# Patient Record
Sex: FEMALE | Race: WHITE | NOT HISPANIC OR LATINO | Employment: OTHER | URBAN - NONMETROPOLITAN AREA
[De-identification: names, ages, dates, MRNs, and addresses within clinical notes are randomized per-mention and may not be internally consistent; named-entity substitution may affect disease eponyms.]

---

## 2017-01-10 ENCOUNTER — ALLSCRIPTS OFFICE VISIT (OUTPATIENT)
Dept: FAMILY MEDICINE CLINIC | Facility: CLINIC | Age: 52
End: 2017-01-10
Payer: MEDICARE

## 2017-01-10 PROCEDURE — 99213 OFFICE O/P EST LOW 20 MIN: CPT | Performed by: NURSE PRACTITIONER

## 2017-01-16 ENCOUNTER — HOSPITAL ENCOUNTER (OUTPATIENT)
Dept: RADIOLOGY | Facility: CLINIC | Age: 52
Discharge: HOME | End: 2017-01-16
Admitting: EMERGENCY MEDICINE
Payer: MEDICARE

## 2017-01-16 ENCOUNTER — OFFICE VISIT (OUTPATIENT)
Dept: URGENT CARE | Facility: CLINIC | Age: 52
End: 2017-01-16
Payer: MEDICARE

## 2017-01-16 DIAGNOSIS — M79.642 PAIN OF LEFT HAND: ICD-10-CM

## 2017-01-16 PROCEDURE — 90714 TD VACC NO PRESV 7 YRS+ IM: CPT

## 2017-01-16 PROCEDURE — 73130 X-RAY EXAM OF HAND: CPT

## 2017-01-16 PROCEDURE — G0463 HOSPITAL OUTPT CLINIC VISIT: HCPCS

## 2017-01-16 PROCEDURE — 99204 OFFICE O/P NEW MOD 45 MIN: CPT

## 2017-04-04 ENCOUNTER — OFFICE VISIT (OUTPATIENT)
Dept: URGENT CARE | Facility: CLINIC | Age: 52
End: 2017-04-04
Payer: MEDICARE

## 2017-04-04 ENCOUNTER — HOSPITAL ENCOUNTER (OUTPATIENT)
Dept: RADIOLOGY | Facility: CLINIC | Age: 52
Discharge: HOME | End: 2017-04-04
Admitting: EMERGENCY MEDICINE
Payer: MEDICARE

## 2017-04-04 DIAGNOSIS — M79.671 PAIN OF RIGHT FOOT: ICD-10-CM

## 2017-04-04 PROCEDURE — 73610 X-RAY EXAM OF ANKLE: CPT

## 2017-04-04 PROCEDURE — 99214 OFFICE O/P EST MOD 30 MIN: CPT

## 2017-04-04 PROCEDURE — G0463 HOSPITAL OUTPT CLINIC VISIT: HCPCS

## 2017-04-04 PROCEDURE — 73630 X-RAY EXAM OF FOOT: CPT

## 2017-04-11 ENCOUNTER — GENERIC CONVERSION - ENCOUNTER (OUTPATIENT)
Dept: OTHER | Facility: OTHER | Age: 52
End: 2017-04-11

## 2017-07-31 ENCOUNTER — ALLSCRIPTS OFFICE VISIT (OUTPATIENT)
Dept: FAMILY MEDICINE CLINIC | Facility: CLINIC | Age: 52
End: 2017-07-31
Payer: MEDICARE

## 2017-07-31 DIAGNOSIS — E55.9 VITAMIN D DEFICIENCY: ICD-10-CM

## 2017-07-31 DIAGNOSIS — Z12.31 ENCOUNTER FOR SCREENING MAMMOGRAM FOR MALIGNANT NEOPLASM OF BREAST: ICD-10-CM

## 2017-07-31 DIAGNOSIS — R53.83 OTHER FATIGUE: ICD-10-CM

## 2017-07-31 DIAGNOSIS — Z13.220 ENCOUNTER FOR SCREENING FOR LIPOID DISORDERS: ICD-10-CM

## 2017-07-31 PROCEDURE — 99213 OFFICE O/P EST LOW 20 MIN: CPT | Performed by: FAMILY MEDICINE

## 2017-08-02 ENCOUNTER — GENERIC CONVERSION - ENCOUNTER (OUTPATIENT)
Dept: OTHER | Facility: OTHER | Age: 52
End: 2017-08-02

## 2017-08-10 ENCOUNTER — GENERIC CONVERSION - ENCOUNTER (OUTPATIENT)
Dept: OTHER | Facility: OTHER | Age: 52
End: 2017-08-10

## 2017-08-11 ENCOUNTER — APPOINTMENT (OUTPATIENT)
Dept: LAB | Facility: HOSPITAL | Age: 52
End: 2017-08-11
Payer: MEDICARE

## 2017-08-11 ENCOUNTER — TRANSCRIBE ORDERS (OUTPATIENT)
Dept: ADMINISTRATIVE | Facility: HOSPITAL | Age: 52
End: 2017-08-11

## 2017-08-11 DIAGNOSIS — R53.83 OTHER FATIGUE: ICD-10-CM

## 2017-08-11 DIAGNOSIS — Z13.220 ENCOUNTER FOR SCREENING FOR LIPOID DISORDERS: ICD-10-CM

## 2017-08-11 DIAGNOSIS — E55.9 VITAMIN D DEFICIENCY: ICD-10-CM

## 2017-08-11 LAB
ALBUMIN SERPL BCP-MCNC: 3.7 G/DL (ref 3.5–5)
ALP SERPL-CCNC: 36 U/L (ref 46–116)
ALT SERPL W P-5'-P-CCNC: 29 U/L (ref 12–78)
ANION GAP SERPL CALCULATED.3IONS-SCNC: 8 MMOL/L (ref 4–13)
AST SERPL W P-5'-P-CCNC: 26 U/L (ref 5–45)
BILIRUB SERPL-MCNC: 0.5 MG/DL (ref 0.2–1)
BUN SERPL-MCNC: 8 MG/DL (ref 5–25)
CALCIUM SERPL-MCNC: 8.3 MG/DL (ref 8.3–10.1)
CHLORIDE SERPL-SCNC: 99 MMOL/L (ref 100–108)
CHOLEST SERPL-MCNC: 209 MG/DL (ref 50–200)
CO2 SERPL-SCNC: 28 MMOL/L (ref 21–32)
CREAT SERPL-MCNC: 0.59 MG/DL (ref 0.6–1.3)
ERYTHROCYTE [DISTWIDTH] IN BLOOD BY AUTOMATED COUNT: 12.3 % (ref 11.6–15.1)
GFR SERPL CREATININE-BSD FRML MDRD: 107 ML/MIN/1.73SQ M
GLUCOSE P FAST SERPL-MCNC: 76 MG/DL (ref 65–99)
HCT VFR BLD AUTO: 36.7 % (ref 34.8–46.1)
HDLC SERPL-MCNC: 78 MG/DL (ref 40–60)
HGB BLD-MCNC: 12.9 G/DL (ref 11.5–15.4)
LDLC SERPL CALC-MCNC: 114 MG/DL (ref 0–100)
MCH RBC QN AUTO: 31.8 PG (ref 26.8–34.3)
MCHC RBC AUTO-ENTMCNC: 35.1 G/DL (ref 31.4–37.4)
MCV RBC AUTO: 90 FL (ref 82–98)
PLATELET # BLD AUTO: 284 THOUSANDS/UL (ref 149–390)
PMV BLD AUTO: 8.8 FL (ref 8.9–12.7)
POTASSIUM SERPL-SCNC: 4.4 MMOL/L (ref 3.5–5.3)
PROT SERPL-MCNC: 6.4 G/DL (ref 6.4–8.2)
RBC # BLD AUTO: 4.06 MILLION/UL (ref 3.81–5.12)
SODIUM SERPL-SCNC: 135 MMOL/L (ref 136–145)
TRIGL SERPL-MCNC: 85 MG/DL
TSH SERPL DL<=0.05 MIU/L-ACNC: 2.3 UIU/ML (ref 0.36–3.74)
VIT B12 SERPL-MCNC: 808 PG/ML (ref 100–900)
WBC # BLD AUTO: 7.09 THOUSAND/UL (ref 4.31–10.16)

## 2017-08-11 PROCEDURE — 36415 COLL VENOUS BLD VENIPUNCTURE: CPT

## 2017-08-11 PROCEDURE — 84443 ASSAY THYROID STIM HORMONE: CPT

## 2017-08-11 PROCEDURE — 85027 COMPLETE CBC AUTOMATED: CPT

## 2017-08-11 PROCEDURE — 80061 LIPID PANEL: CPT

## 2017-08-11 PROCEDURE — 80053 COMPREHEN METABOLIC PANEL: CPT

## 2017-08-11 PROCEDURE — 82652 VIT D 1 25-DIHYDROXY: CPT

## 2017-08-11 PROCEDURE — 82607 VITAMIN B-12: CPT

## 2017-08-14 LAB — 1,25(OH)2D3 SERPL-MCNC: 38.5 PG/ML (ref 19.9–79.3)

## 2017-08-31 ENCOUNTER — APPOINTMENT (OUTPATIENT)
Dept: LAB | Facility: HOSPITAL | Age: 52
End: 2017-08-31
Payer: MEDICARE

## 2017-08-31 ENCOUNTER — ALLSCRIPTS OFFICE VISIT (OUTPATIENT)
Dept: FAMILY MEDICINE CLINIC | Facility: CLINIC | Age: 52
End: 2017-08-31
Payer: MEDICARE

## 2017-08-31 DIAGNOSIS — N89.8 OTHER SPECIFIED NONINFLAMMATORY DISORDER OF VAGINA: ICD-10-CM

## 2017-08-31 LAB
BILIRUB UR QL STRIP: NEGATIVE
BILIRUB UR QL STRIP: NORMAL
CLARITY UR: CLEAR
CLARITY UR: NORMAL
COLOR UR: YELLOW
COLOR UR: YELLOW
GLUCOSE (HISTORICAL): NORMAL
GLUCOSE UR STRIP-MCNC: NEGATIVE MG/DL
HGB UR QL STRIP.AUTO: NEGATIVE
HGB UR QL STRIP.AUTO: NORMAL
KETONES UR STRIP-MCNC: NEGATIVE MG/DL
KETONES UR STRIP-MCNC: NORMAL MG/DL
LEUKOCYTE ESTERASE UR QL STRIP: NEGATIVE
LEUKOCYTE ESTERASE UR QL STRIP: NORMAL
NITRITE UR QL STRIP: NEGATIVE
NITRITE UR QL STRIP: NORMAL
PH UR STRIP.AUTO: 7 [PH]
PH UR STRIP.AUTO: 7 [PH] (ref 4.5–8)
PROT UR STRIP-MCNC: NEGATIVE MG/DL
PROT UR STRIP-MCNC: NORMAL MG/DL
SP GR UR STRIP.AUTO: 1 (ref 1–1.03)
SP GR UR STRIP.AUTO: 1.02
UROBILINOGEN UR QL STRIP.AUTO: 0.2
UROBILINOGEN UR QL STRIP.AUTO: 0.2 E.U./DL

## 2017-08-31 PROCEDURE — 99213 OFFICE O/P EST LOW 20 MIN: CPT | Performed by: FAMILY MEDICINE

## 2017-08-31 PROCEDURE — 81003 URINALYSIS AUTO W/O SCOPE: CPT

## 2017-09-01 ENCOUNTER — HOSPITAL ENCOUNTER (OUTPATIENT)
Dept: MAMMOGRAPHY | Facility: HOSPITAL | Age: 52
Discharge: HOME | End: 2017-09-01
Payer: MEDICARE

## 2017-09-01 DIAGNOSIS — Z12.31 ENCOUNTER FOR SCREENING MAMMOGRAM FOR MALIGNANT NEOPLASM OF BREAST: ICD-10-CM

## 2017-09-01 PROCEDURE — G0202 SCR MAMMO BI INCL CAD: HCPCS

## 2017-09-01 PROCEDURE — 77063 BREAST TOMOSYNTHESIS BI: CPT

## 2017-10-23 ENCOUNTER — APPOINTMENT (OUTPATIENT)
Dept: RADIOLOGY | Facility: CLINIC | Age: 52
End: 2017-10-23
Payer: MEDICARE

## 2017-10-23 ENCOUNTER — OFFICE VISIT (OUTPATIENT)
Dept: URGENT CARE | Facility: CLINIC | Age: 52
End: 2017-10-23
Payer: MEDICARE

## 2017-10-23 DIAGNOSIS — M25.561 PAIN IN RIGHT KNEE: ICD-10-CM

## 2017-10-23 PROCEDURE — G0463 HOSPITAL OUTPT CLINIC VISIT: HCPCS

## 2017-10-23 PROCEDURE — 99213 OFFICE O/P EST LOW 20 MIN: CPT

## 2017-10-23 PROCEDURE — 73564 X-RAY EXAM KNEE 4 OR MORE: CPT

## 2017-10-24 NOTE — PROGRESS NOTES
Assessment  1  Acute pain of right knee (760 42) (Z70 454)    Plan  Acute pain of right knee    · * XR KNEE 4+ VW RIGHT INJURY; Status:Active - Retrospective Authorization; Requested  IRVING:[id number];    · * XR KNEE 4+ VW RIGHT INJURY; Status:Canceled - Retrospective Authorization; Discussion/Summary  Discussion Summary:   Get pull on ace wrap for right knee- wear for workout and when in painTylenol Arthritis to Mobic PRN- do not add long term, only when having pain and before workoutsortho consult if pain continues  Medication Side Effects Reviewed: Possible side effects of new medications were reviewed with the patient/guardian today  Understands and agrees with treatment plan: The treatment plan was reviewed with the patient/guardian  The patient/guardian understands and agrees with the treatment plan      Chief Complaint  1  Knee Pain  Chief Complaint Free Text Note Form: C/O sudden onset of right knee pain upon rising this AM  Pt denies any known injury  Pt states that she is having difficulty walking      History of Present Illness  HPI: c/o right knee- hit right knee off of elliptical machine a few weeks ago  2 days ago she did a high impact cardio/core workout  Has been an aerobics/step instructor times many years but has never noticed knee pain before  this morning she woke up with sharp intermittent right knee pain with extension and walking  no swelling, no redness  pain is described as just below the knee cap   Hospital Based Practices Required Assessment:   Pain Assessment   the patient states they have pain  The pain is located in the right knee  The patient describes the pain as aching  (on a scale of 0 to 10, the patient rates the pain at 8 )   Abuse And Domestic Violence Screen    Yes, the patient is safe at home  -- The patient states no one is hurting them  Depression And Suicide Screen  No, the patient has not had thoughts of hurting themself     No, the patient has not felt depressed in the past 7 days  Knee Pain: AG ANDUJAR presents with complaints of knee pain  Associated symptoms include difficulty ambulating, but-- no swelling,-- no warmth,-- no redness,-- no ecchymosis,-- no stiffness,-- no decreased range of motion,-- no locking,-- no instability,-- no audible pop at the time of injury,-- no fever,-- no chills,-- no localized rash,-- no generalized rash-- and-- no pain in other joints  Active Problems  1  Allergic rhinitis (477 9) (J30 9)   2  Arthritis (716 90) (M19 90)   3  Backache (724 5) (M54 9)   4  Cervical radiculopathy (723 4) (M54 12)   5  Cervical Spine Pain By Bilateral Flexion At Initiation   6  Chronic pain of right ankle (642 63,720 41) (M25 571,G89 29)   7  Closed Fracture Of The Left Index Finger MCP Joint (816 01)   8  Constipation (564 00) (K59 00)   9  Contact dermatitis (692 9) (L25 9)   10  Depression (311) (F32 9)   11  Dysmenorrhea (625 3) (N94 6)   12  Encounter for gynecological examination (V72 31) (Z01 419)   13  Encounter for routine gynecological examination (V72 31) (Z01 419)   14  Encounter for screening colonoscopy (V76 51) (Z12 11)   15  Esophageal reflux (530 81) (K21 9)   16  Fatigue (780 79) (R53 83)   17  Fibromyalgia (729 1) (M79 7)   18  Finger pain, left (729 5) (M79 645)   19  Fracture of metacarpal, closed (815 00) (S62 309A)   20  Herniated lumbar intervertebral disc (722 10) (M51 26)   21  Hypercholesteremia (272 0) (E78 00)   22  Hypocalcemia (275 41) (E83 51)   23  Hyponatremia (276 1) (E87 1)   24  Laceration Of Hand (882 0)   25  Lumbago (724 2) (M54 5)   26  Menometrorrhagia (626 2) (N92 1)   27  Menorrhagia (626 2) (N92 0)   28  Narcolepsy (347 00) (G47 419)   29  Nasal sore (478 19) (J34 89)   30  Need for prophylactic vaccination and inoculation against influenza (V04 81) (Z23)   31  Nose irritation (478 19) (J34 89)   32  Pain in joint of left shoulder (719 41) (M25 512)   33  Pain of left hand (729 5) (M79 642)   34   Primary localized osteoarthrosis of the hip, unspecified laterality (715 15) (M16 10)   35  Right foot pain (729 5) (M79 671)   36  Right hip pain (719 45) (M25 551)   37  Screening for lipid disorders (V77 91) (Z13 220)   38  Sinus bradycardia (427 89) (R00 1)   39  Skin lesion (709 9) (L98 9)   40  Spondylosis of lumbosacral region without myelopathy or radiculopathy (721 3)    (M47 817)   41  Sprain of left ring finger (842 10) (S63 615A)   42  Tooth pain (525 9) (K08 89)   43  Urinary tract infection (599 0) (N39 0)   44  Vaginal abrasion (911 0) (S30 814A)   45  Vaginal irritation (623 9) (N89 8)   46  Visit for screening mammogram (V76 12) (Z12 31)   47  Vitamin D deficiency (268 9) (E55 9)   48  Wart (078 10) (B07 9)    Past Medical History  1  History of Acute upper respiratory infection (465 9) (J06 9)   2  History Of 3  Previous Pregnancies (V61 5)   3  Need for prophylactic vaccination and inoculation against influenza (V04 81) (Z23)  Active Problems And Past Medical History Reviewed: The active problems and past medical history were reviewed and updated today  Family History  Paternal Grandmother    1  Family history of Breast Cancer (V16 3)   2  Family history of Colon Cancer (V16 0)  Family History    3  Family history of Arthritis (V17 7)   4  Family history of Diabetes Mellitus (V18 0)   5  Family history of Hypertension (V17 49)   6  Denied: Family history of Osteoporosis   7  Denied: Family history of Ovarian Cancer   8  Denied: Family history of Uterine Cancer  Family History Reviewed: The family history was reviewed and updated today  Social History   · Being A Social Drinker   · Caffeine Use   · Denied: History of Drug Use   · Never A Smoker  Social History Reviewed: The social history was reviewed and updated today  Surgical History  1  History of Breast Surgery Lumpectomy   2  History of Dilation And Curettage Of Cervical Stump   3  History of Repair Of Traumatic Wound   4   History of Shoulder Surgery Right  Surgical History Reviewed: The surgical history was reviewed and updated today  Current Meds   1  Calcium TABS; Therapy: (Ruth Ann Pedraza) to Recorded   2  DULoxetine HCl - 30 MG Oral Capsule Delayed Release Particles; take 1 capsule daily; Therapy: 34YGE8912 to (Evaluate:06Jan2018)  Requested for: 84Nbt4047; Last   Rx:[id number] Ordered   3  Gabapentin 300 MG Oral Capsule; take 1 capsule by mouth twice a day; Therapy: 03Sep2013 to (Evaluate:06Dec2017)  Requested for: 66Oxe1772; Last   Rx:[id number] Ordered   4  Iron Supplement 325 (65 Fe) MG TABS; TAKE 1 TABLET DAILY AS DIRECTED; Therapy: (Recorded:03Mar2014) to Recorded   5  Meloxicam 15 MG Oral Tablet; take 1 tablet by mouth once daily; Therapy: 00FNY8850 to (Evaluate:06Jan2018)  Requested for: 89Jtl6360; Last   Rx:[id number] Ordered   6  Stool Softener TABS; Therapy: (Ruth Ann Pedraza) to Recorded   7  Vagisil Maximum Strength 20-3 % Vaginal Cream; USE AS DIRECTED; Therapy: 09Lwz4176 to (Last Rx:96Nnx5231)  Requested for: 79Gaq9559 Ordered  Medication List Reviewed: The medication list was reviewed and updated today  Allergies  1  Lyrica CAPS   2  Amitriptyline HCl TABS    Vitals  Signs   Recorded: 23Oct2017 10:02AM   Temperature: 96 7 F  Heart Rate: 68  Respiration: 18  Systolic: 124  Diastolic: 62  Height: 5 ft 2 in  Weight: 106 lb 0 66 oz  BMI Calculated: 19 4  BSA Calculated: 1 46  O2 Saturation: 99  Pain Scale: 8    Physical Exam    Constitutional   General appearance: No acute distress, well appearing and well nourished  Pulmonary   Respiratory effort: No increased work of breathing or signs of respiratory distress  Auscultation of lungs: Clear to auscultation  Cardiovascular   Auscultation of heart: Normal rate and rhythm, normal S1 and S2, without murmurs  Musculoskeletal   Gait and station: Normal     Digits and nails: Normal without clubbing or cyanosis      Inspection/palpation of joints, bones, and muscles: Normal  -- nontender with palpation, no swelling, no redness, no warmth  no laxity/no crepitus  Skin   Skin and subcutaneous tissue: Normal without rashes or lesions  Psychiatric   Orientation to person, place, and time: Normal     Mood and affect: Normal        Results/Data  Diagnostic Studies Reviewed: I personally reviewed the films/images/results in the office today  My interpretation follows  X-ray Review normal xray right knee        Future Appointments    Date/Time Provider Specialty Site   07/31/2018 10:30 AM Daija Adair, [address] [place] [address]     Signatures   Electronically signed by : Brady Denver; Oct 23 2017 11:10AM EST                       (Author)    Electronically signed by : EMY Us ; Oct 23 2017  4:51PM EST                       (Co-author)

## 2017-12-03 ENCOUNTER — HOSPITAL ENCOUNTER (EMERGENCY)
Facility: HOSPITAL | Age: 52
Discharge: HOME | End: 2017-12-03
Attending: EMERGENCY MEDICINE | Admitting: EMERGENCY MEDICINE
Payer: MEDICARE

## 2017-12-03 VITALS
DIASTOLIC BLOOD PRESSURE: 58 MMHG | RESPIRATION RATE: 20 BRPM | HEIGHT: 62 IN | OXYGEN SATURATION: 99 % | TEMPERATURE: 99.6 F | BODY MASS INDEX: 20.24 KG/M2 | SYSTOLIC BLOOD PRESSURE: 122 MMHG | WEIGHT: 110 LBS | HEART RATE: 61 BPM

## 2017-12-03 DIAGNOSIS — R21 RASH AND NONSPECIFIC SKIN ERUPTION: Primary | ICD-10-CM

## 2017-12-03 DIAGNOSIS — T78.40XA ALLERGIC REACTION, INITIAL ENCOUNTER: ICD-10-CM

## 2017-12-03 PROCEDURE — 99283 EMERGENCY DEPT VISIT LOW MDM: CPT

## 2017-12-03 RX ORDER — DULOXETIN HYDROCHLORIDE 30 MG/1
20 CAPSULE, DELAYED RELEASE ORAL DAILY
COMMUNITY
End: 2018-04-17 | Stop reason: SDUPTHER

## 2017-12-03 RX ORDER — MELOXICAM 15 MG/1
15 TABLET ORAL DAILY
COMMUNITY
End: 2018-04-17 | Stop reason: SDUPTHER

## 2017-12-03 RX ORDER — GABAPENTIN 300 MG/1
100 CAPSULE ORAL DAILY
COMMUNITY
End: 2018-04-17 | Stop reason: SDUPTHER

## 2017-12-03 RX ORDER — PREDNISONE 20 MG/1
TABLET ORAL
Qty: 9 TABLET | Refills: 0 | Status: SHIPPED | OUTPATIENT
Start: 2017-12-03 | End: 2018-03-01

## 2017-12-03 RX ORDER — FERROUS SULFATE 325(65) MG
325 TABLET ORAL
COMMUNITY
End: 2020-04-21 | Stop reason: ALTCHOICE

## 2017-12-03 NOTE — ED PROVIDER NOTES
History  Chief Complaint   Patient presents with    Eye Problem     c/o swelling right orbit  Awoke with same  Possible swelling starting with left eye     Pt began with swelling/itching around right eye last PM-now starting same around left eye  No visual changes  No resp sx  No difficulty swallowing  No fever/chills  Only new exposure pt can think of is new fabric softner- discussed stopping  History provided by:  Patient  Rash   Location:  Face  Facial rash location:  L eyelid and R eyelid  Quality: dryness, itchiness and swelling    Quality: not blistering, not bruising, not draining, not painful and not weeping    Progression:  Worsening  Chronicity:  New  Context: new detergent/soap    Context: not animal contact, not exposure to similar rash, not food, not insect bite/sting, not medications, not plant contact and not sick contacts    Relieved by:  Nothing  Ineffective treatments:  None tried  Associated symptoms: no abdominal pain, no diarrhea, no fever, no headaches, no hoarse voice, no joint pain, no myalgias, no nausea, no shortness of breath, no sore throat, no throat swelling, no tongue swelling, no URI, not vomiting and not wheezing        Prior to Admission Medications   Prescriptions Last Dose Informant Patient Reported? Taking?    Calcium Carbonate-Vitamin D (CALCIUM 500 + D PO)   Yes Yes   Sig: Take 1 tablet by mouth daily   DULoxetine (CYMBALTA) 30 mg delayed release capsule   Yes Yes   Sig: Take 20 mg by mouth daily   Docusate Calcium (STOOL SOFTENER PO)   Yes Yes   Sig: Take 1 capsule by mouth daily   ferrous sulfate 325 (65 Fe) mg tablet   Yes Yes   Sig: Take 325 mg by mouth daily with breakfast   gabapentin (NEURONTIN) 300 mg capsule   Yes Yes   Sig: Take 100 mg by mouth daily   meloxicam (MOBIC) 15 mg tablet   Yes Yes   Sig: Take 15 mg by mouth daily      Facility-Administered Medications: None       Past Medical History:   Diagnosis Date    Back problem        Past Surgical History:   Procedure Laterality Date    LEG SURGERY Left     SHOULDER ARTHROSCOPY         History reviewed  No pertinent family history  I have reviewed and agree with the history as documented  Social History   Substance Use Topics    Smoking status: Former Smoker    Smokeless tobacco: Never Used    Alcohol use Yes      Comment: occassional        Review of Systems   Constitutional: Negative  Negative for activity change, appetite change, chills, diaphoresis and fever  HENT: Negative  Negative for congestion, drooling, hoarse voice, mouth sores, sore throat, trouble swallowing and voice change  Eyes: Negative for photophobia, redness and visual disturbance  Respiratory: Negative  Negative for cough, chest tightness, shortness of breath and wheezing  Cardiovascular: Negative  Negative for chest pain  Gastrointestinal: Negative  Negative for abdominal pain, diarrhea, nausea and vomiting  Genitourinary: Negative  Musculoskeletal: Negative  Negative for arthralgias, gait problem, joint swelling, myalgias, neck pain and neck stiffness  Skin: Positive for rash  Neurological: Negative  Negative for dizziness, facial asymmetry and headaches  Psychiatric/Behavioral: Negative  All other systems reviewed and are negative  Physical Exam  ED Triage Vitals [12/03/17 0910]   Temperature Pulse Respirations Blood Pressure SpO2   99 6 °F (37 6 °C) 61 20 122/58 99 %      Temp src Heart Rate Source Patient Position - Orthostatic VS BP Location FiO2 (%)   -- Monitor -- -- --      Pain Score       No Pain           Orthostatic Vital Signs  Vitals:    12/03/17 0910   BP: 122/58   Pulse: 61       Physical Exam   Constitutional: She is oriented to person, place, and time  She appears well-developed and well-nourished  She is active and cooperative  Non-toxic appearance  She does not have a sickly appearance  She does not appear ill  No distress  HENT:   Head: Normocephalic and atraumatic  Head is without raccoon's eyes and without abrasion  Right Ear: Hearing, tympanic membrane and ear canal normal    Left Ear: Hearing, tympanic membrane and ear canal normal    Nose: Nose normal    Mouth/Throat: Oropharynx is clear and moist  Mucous membranes are not dry and not cyanotic  No oropharyngeal exudate, posterior oropharyngeal edema or posterior oropharyngeal erythema  There is red/dry,mildly swollen areas periorbital right greater then left  No eye involvement   Eyes: Conjunctivae and EOM are normal  Pupils are equal, round, and reactive to light  Right eye exhibits no chemosis and no discharge  Left eye exhibits no chemosis and no discharge  No scleral icterus  Neck: Normal range of motion  Neck supple  No JVD present  No spinous process tenderness and no muscular tenderness present  Cardiovascular: Normal rate, regular rhythm, intact distal pulses and normal pulses  No extrasystoles are present  No perf edema or calf tenderness   Pulmonary/Chest: Effort normal  No accessory muscle usage or stridor  No tachypnea  No respiratory distress  She has no decreased breath sounds  She has no wheezes  She has no rhonchi  She has no rales  Abdominal: Soft  Bowel sounds are normal  She exhibits no distension and no ascites  There is no tenderness  There is no rigidity, no guarding and no CVA tenderness  Neurological: She is alert and oriented to person, place, and time  She has normal strength and normal reflexes  No cranial nerve deficit  Skin: Skin is warm and dry  Rash noted  No petechiae noted  She is not diaphoretic  No cyanosis  No pallor  Psychiatric: She has a normal mood and affect  Her speech is normal and behavior is normal  Cognition and memory are normal    Vitals reviewed        ED Medications  Medications - No data to display    Diagnostic Studies  Results Reviewed     None                 No orders to display              Procedures  Procedures       Phone Contacts  ED Phone Contact    ED Course  ED Course                                MDM  CritCare Time    Disposition  Final diagnoses: Allergic reaction, initial encounter   Rash and nonspecific skin eruption     Time reflects when diagnosis was documented in both MDM as applicable and the Disposition within this note     Time User Action Codes Description Comment    12/5/2017 11:36 AM Myrle Leyla Add [T78 40XA] Allergic reaction, initial encounter     12/5/2017 11:36 AM Myrle Leyla Add [R21] Rash and nonspecific skin eruption     12/5/2017 11:36 AM Myrle Leyla Modify [T78 40XA] Allergic reaction, initial encounter     12/5/2017 11:36 AM Myrle Leyla Modify [R21] Rash and nonspecific skin eruption       ED Disposition     ED Disposition Condition Comment    Discharge  Farber Craze discharge to home/self care  Condition at discharge: Stable        Follow-up Information    None       Discharge Medication List as of 12/3/2017  9:43 AM      START taking these medications    Details   predniSONE 20 mg tablet Take 2 tablets daily for 3 days then 1 tablet daily for 3 days, Print      tobramycin-dexamethasone (TOBRADEX) ophthalmic ointment Use small amt  ointment on skin around eye area  BID, Print         CONTINUE these medications which have NOT CHANGED    Details   Calcium Carbonate-Vitamin D (CALCIUM 500 + D PO) Take 1 tablet by mouth daily, Historical Med      Docusate Calcium (STOOL SOFTENER PO) Take 1 capsule by mouth daily, Historical Med      DULoxetine (CYMBALTA) 30 mg delayed release capsule Take 20 mg by mouth daily, Historical Med      ferrous sulfate 325 (65 Fe) mg tablet Take 325 mg by mouth daily with breakfast, Historical Med      gabapentin (NEURONTIN) 300 mg capsule Take 100 mg by mouth daily, Historical Med      meloxicam (MOBIC) 15 mg tablet Take 15 mg by mouth daily, Historical Med           No discharge procedures on file      ED Provider  Electronically Signed by           Ho Anders Laura Dior,   12/05/17 1136

## 2017-12-03 NOTE — DISCHARGE INSTRUCTIONS
Avoid any new products and stop new fabric softener  Follow with your doctor for continued care    Dermatitis   WHAT YOU NEED TO KNOW:   What is dermatitis? Dermatitis is skin inflammation  You may have an itchy rash, redness, or swelling  You may also have bumps or blisters that crust over or ooze clear fluid  What causes dermatitis? Dermatitis may be caused by allergens such as dust mites, pet dander, pollen, and certain foods  Dermatitis can also develop when something touches your skin and irritates it or causes an allergic reaction  Examples include soaps, chemicals, latex, and poison ivy  How is dermatitis diagnosed? Your healthcare provider will examine your skin  He will ask questions about your rash and any other symptoms you have  Tell him if you noticed anything trigger your rash, such as a certain food or activity  Tell him about any medicines you are taking or any allergies or medical conditions you have  How is dermatitis treated? Treatment depends on the cause of your rash  You may need medicines to help decrease itching and inflammation or treat a bacterial infection  They may be given as a topical cream, shot, or a pill  How can I manage my symptoms? · Apply a cool compress to your rash  This will help soothe your skin  · Keep your skin moist   Rub unscented cream or lotion on your skin to prevent dryness and itching  Do this right after a lukewarm bath or shower when your skin is still damp  · Avoid skin irritants  Do not use skin irritants, such as makeup, hair products, soaps, and cleansers  Use products that do not contain fragrances or dye  Call 911 if you have any of the following symptoms of anaphylaxis:   · Sudden trouble breathing    · Throat swelling and tightness    · Dizziness, lightheadedness, fainting, or confusion  When should I seek immediate care? · You develop a fever or have red streaks going up your arm or leg      · Your rash gets more swollen, red, or hot   When should I contact my healthcare provider? · Your skin blisters, oozes white or yellow pus, or has a foul-smelling discharge  · Your rash spreads or does not get better, even after treatment  · You have questions or concerns about your condition or care  CARE AGREEMENT:   You have the right to help plan your care  Learn about your health condition and how it may be treated  Discuss treatment options with your caregivers to decide what care you want to receive  You always have the right to refuse treatment  The above information is an  only  It is not intended as medical advice for individual conditions or treatments  Talk to your doctor, nurse or pharmacist before following any medical regimen to see if it is safe and effective for you  © 2017 [employer] Information is for End User's use only and may not be sold, redistributed or otherwise used for commercial purposes  All illustrations and images included in CareNotes® are the copyrighted property of [employer]  or [employer]

## 2017-12-11 ENCOUNTER — OFFICE VISIT (OUTPATIENT)
Dept: URGENT CARE | Facility: CLINIC | Age: 52
End: 2017-12-11
Payer: MEDICARE

## 2017-12-11 PROCEDURE — 99214 OFFICE O/P EST MOD 30 MIN: CPT

## 2017-12-11 PROCEDURE — G0463 HOSPITAL OUTPT CLINIC VISIT: HCPCS

## 2017-12-12 NOTE — PROGRESS NOTES
Assessment    1  Back muscle spasm (724 8) (I18 [id number])    Plan  Vaginal irritation    · Vagisil Maximum Strength 20-3 % Vaginal Cream    Discussion/Summary  Discussion Summary:   Recommend patient continue meloxicam as directed  Use warm moist heat or ice to help with symptoms  Patient refused muscle relaxer  Patient was recently on a steroid taper and I do not recommend repeating that  If symptoms are not improving, follow up with PCP  Recommended stretching exercises to stretch out front part of the shoulder and her chest area which would relieve pull on the scapula area  Medication Side Effects Reviewed: Possible side effects of new medications were reviewed with the patient/guardian today  Understands and agrees with treatment plan: The treatment plan was reviewed with the patient/guardian  The patient/guardian understands and agrees with the treatment plan      Chief Complaint    1  Back Pain  Chief Complaint Free Text Note Form: Pt c/o severe stabbing pain in right scapula this Am  Pt noted pain in her right upper arm and shoulder last PM  Pt denies any known injury  Pt has h/o chronic neck and upper back pain as well as previous shoulder injury  The pain has decreased markedly since in the right scapula  History of Present Illness  HPI: 51-year-old female here with complaint of sharp stabbing pain in her right scapular area  Patient has a long history of chronic cervical issues and neck pain  Sees Pain Management  Gets regular injections  Intakes molluscum on a regular basis  Denies any radiation of the pain  Denies any numbness or tingling in her upper extremities  No weakness in her arms or legs  Was recently on a steroid taper for an allergic reaction  Hospital Based Practices Required Assessment:  Pain Assessment  the patient states they have pain  The pain is located in the right scapula   The patient describes the pain as sharp  (on a scale of 0 to 10, the patient rates the pain at 4 )  Abuse And Domestic Violence Screen   Yes, the patient is safe at home  -- The patient states no one is hurting them  Depression And Suicide Screen  No, the patient has not had thoughts of hurting themself  No, the patient has not felt depressed in the past 7 days  Back Pain: Jenny Llamas presents with complaints of back pain  Associated symptoms include spasm-- and-- fever, but-- no stiffness,-- no night sweats,-- no general malaise,-- no weight loss,-- no arm numbness,-- no leg numbness,-- no arm weakness,-- no leg weakness,-- no urinary incontinence,-- no fecal incontinence,-- no urinary retention-- and-- no rash localized to the area of pain  Review of Systems  Focused-Female:  Constitutional: No fever, no chills, feels well, no tiredness, no recent weight gain or loss  ENT: no ear ache, no loss of hearing, no nosebleeds or nasal discharge, no sore throat or hoarseness  Cardiovascular: no complaints of slow or fast heart rate, no chest pain, no palpitations, no leg claudication or lower extremity edema  Respiratory: no complaints of shortness of breath, no wheezing, no dyspnea on exertion, no orthopnea or PND  Musculoskeletal: as noted in HPI  ROS Reviewed:   ROS reviewed  Active Problems    1  Acute pain of right knee (719 46) (M25 561)   2  Allergic rhinitis (477 9) (J30 9)   3  Arthritis (716 90) (M19 90)   4  Backache (724 5) (M54 9)   5  Cervical radiculopathy (723 4) (M54 12)   6  Cervical Spine Pain By Bilateral Flexion At Initiation   7  Chronic pain of right ankle (719 47,338 29) (M25 571,G89 29)   8  Closed Fracture Of The Left Index Finger MCP Joint (816 01)   9  Constipation (564 00) (K59 00)   10  Contact dermatitis (692 9) (L25 9)   11  Depression (311) (F32 9)   12  Dysmenorrhea (625 3) (N94 6)   13  Encounter for gynecological examination (V72 31) (Z01 419)   14  Encounter for routine gynecological examination (V72 31) (Z01 419)   15   Encounter for screening colonoscopy (V76 51) (Z12 11)   16  Esophageal reflux (530 81) (K21 9)   17  Fatigue (780 79) (R53 83)   18  Fibromyalgia (729 1) (M79 7)   19  Finger pain, left (729 5) (M79 645)   20  Fracture of metacarpal, closed (815 00) (S62 309A)   21  Herniated lumbar intervertebral disc (722 10) (M51 26)   22  Hypercholesteremia (272 0) (E78 00)   23  Hypocalcemia (275 41) (E83 51)   24  Hyponatremia (276 1) (E87 1)   25  Laceration Of Hand (882 0)   26  Lumbago (724 2) (M54 5)   27  Menometrorrhagia (626 2) (N92 1)   28  Menorrhagia (626 2) (N92 0)   29  Narcolepsy (347 00) (G47 419)   30  Nasal sore (478 19) (J34 89)   31  Need for prophylactic vaccination and inoculation against influenza (V04 81) (Z23)   32  Nose irritation (478 19) (J34 89)   33  Pain in joint of left shoulder (719 41) (M25 512)   34  Pain of left hand (729 5) (M79 642)   35  Primary localized osteoarthrosis of the hip, unspecified laterality (715 15) (M16 10)   36  Right foot pain (729 5) (M79 671)   37  Right hip pain (719 45) (M25 551)   38  Screening for lipid disorders (V77 91) (Z13 220)   39  Sinus bradycardia (427 89) (R00 1)   40  Skin lesion (709 9) (L98 9)   41  Spondylosis of lumbosacral region without myelopathy or radiculopathy (721 3)  (M47 817)   42  Sprain of left ring finger (842 10) (S63 615A)   43  Tooth pain (525 9) (K08 89)   44  Urinary tract infection (599 0) (N39 0)   45  Vaginal abrasion (911 0) (S30 814A)   46  Vaginal irritation (623 9) (N89 8)   47  Visit for screening mammogram (V76 12) (Z12 31)   48  Vitamin D deficiency (268 9) (E55 9)   49  Wart (078 10) (B07 9)    Past Medical History  1  History of Acute upper respiratory infection (465 9) (J06 9)   2  History Of 3  Previous Pregnancies (V61 5)   3  Need for prophylactic vaccination and inoculation against influenza (V04 81) (Z23)  Active Problems And Past Medical History Reviewed: The active problems and past medical history were reviewed and updated today        Family History  Paternal Grandmother    1  Family history of Breast Cancer (V16 3)   2  Family history of Colon Cancer (V16 0)  Family History    3  Family history of Arthritis (V17 7)   4  Family history of Diabetes Mellitus (V18 0)   5  Family history of Hypertension (V17 49)   6  Denied: Family history of Osteoporosis   7  Denied: Family history of Ovarian Cancer   8  Denied: Family history of Uterine Cancer  Family History Reviewed: The family history was reviewed and updated today  Social History   · Being A Social Drinker   · Caffeine Use   · Denied: History of Drug Use   · Never A Smoker  Social History Reviewed: The social history was reviewed and updated today  The social history was reviewed and is unchanged  Surgical History    1  History of Breast Surgery Lumpectomy   2  History of Dilation And Curettage Of Cervical Stump   3  History of Repair Of Traumatic Wound   4  History of Shoulder Surgery Right  Surgical History Reviewed: The surgical history was reviewed and updated today  Current Meds   1  Calcium TABS; Therapy: (Janneth Croon) to Recorded   2  DULoxetine HCl - 30 MG Oral Capsule Delayed Release Particles; take 1 capsule daily; Therapy: [id number] to (Evaluate:06Jan2018)  Requested for: 08Sep2017; Last Rx:08Sep2017 Ordered   3  Gabapentin 300 MG Oral Capsule; take 1 capsule by mouth twice a day; Therapy: 03Sep2013 to (Evaluate:06Dec2017)  Requested for: 08Aug2017; Last Rx:08Aug2017 Ordered   4  Iron Supplement 325 (65 Fe) MG TABS; TAKE 1 TABLET DAILY AS DIRECTED; Therapy: (Recorded:03Mar2014) to Recorded   5  Meloxicam 15 MG Oral Tablet; take 1 tablet by mouth once daily; Therapy: 82UJA4140 to (Evaluate:06Jan2018)  Requested for: 08Sep2017; Last Rx:08Sep2017 Ordered   6  Stool Softener TABS; Therapy: (Janneth Croon) to Recorded   7  Vagisil Maximum Strength 20-3 % Vaginal Cream; USE AS DIRECTED;  Therapy: 79Jwd5372 to (Last Rx:09Xqh2258)  Requested for: 31Aug2017 Ordered  Medication List Reviewed: The medication list was reviewed and updated today  Allergies    1  Lyrica CAPS   2  Amitriptyline HCl TABS    Vitals  Signs   Recorded: 66Fri3181 10:40AM   Temperature: 97 1 F  Heart Rate: 72  Respiration: 18  Systolic: 045  Diastolic: 70  Height: 5 ft 2 in  Weight: 106 lb 0 96 oz  BMI Calculated: 19 4  BSA Calculated: 1 46  O2 Saturation: 98  Pain Scale: 4    Physical Exam   Constitutional  General appearance: No acute distress, well appearing and well nourished  Pulmonary  Respiratory effort: No increased work of breathing or signs of respiratory distress  Auscultation of lungs: Clear to auscultation  Cardiovascular  Auscultation of heart: Normal rate and rhythm, normal S1 and S2, without murmurs  Abdomen Cervical spine with full range of motion  Nontender to palpation  It is noted right scapular area with palpable muscle spasm compared to the left  Full range of motion of right shoulder and left shoulder    Musculoskeletal  Gait and station: Normal        Future Appointments    Date/Time Provider Specialty Site   07/31/2018 10:30 AM Edel Perry, [address] [place] [address]       Signatures   Electronically signed by : Maged Fonseca, [facility]; Dec 11 2017 11:06AM EST                       (Author)    Electronically signed by : EMY Gonzalez ; Dec 11 2017  2:41PM EST                       (Co-author)

## 2018-01-11 NOTE — RESULT NOTES
Verified Results  (1) TISSUE EXAM [id number] 02:00PM Robby Ladd     Test Name Result Flag Reference   LAB AP CASE REPORT (Report)     Surgical Pathology Report             Case: [id number]                   Authorizing Provider: Sonya Leyva DO     Collected:      11/29/2016 1400        Pathologist:      Ancelmo Starr MD      Received:      11/30/2016 1157        Specimen:  Skin, Other, Right calf   LAB AP FINAL DIAGNOSIS (Report)     A  Skin, Right calf, shave biopsy:  - Benign verrucous and reticulated keratosis  - Negative for malignancy  Interpretation performed at [place] [address]  Electronically signed by Ancelmo Starr MD on 12/2/2016 at 1:36 PM   LAB AP SURGICAL ADDITIONAL INFORMATION (Report)     These tests were developed and their performance characteristics   determined by [facility] or [facility]  They may not be cleared or approved by the [place]  Food and   Drug Administration  The FDA has determined that such clearance or   approval is not necessary  These tests are used for clinical purposes  They should not be regarded as investigational or for research  This   laboratory has been approved by [place], designated as a high-complexity   laboratory and is qualified to perform these tests  LAB AP GROSS DESCRIPTION (Report)     A  The specimen is received in formalin, labeled with the patient's name   and hospital number, and is designated right calf skin lesion  The   specimen consists of one tan non-hairbearing irregular shaped, portion of   skin measuring 0 5 x 0 4 x 0 2 cm  The surface is keratotic and friable  The margin of resection is inked blue, and the surface tips are inked red  Also submitted in container is one tan keratotic and friable skin   fragment, consistent with dislodged portion of surface of first mentioned   portion of skin, which measures 0 3 cm in greatest dimension   Entirely submitted  One cassette, bisected  Collection time not given; fixation time not greater than 60 5 hours   MAS   LAB AP CLINICAL INFORMATION Actinic keratosis

## 2018-01-13 VITALS
HEIGHT: 62 IN | DIASTOLIC BLOOD PRESSURE: 66 MMHG | RESPIRATION RATE: 16 BRPM | WEIGHT: 108 LBS | OXYGEN SATURATION: 97 % | SYSTOLIC BLOOD PRESSURE: 102 MMHG | BODY MASS INDEX: 19.88 KG/M2 | TEMPERATURE: 96.1 F | HEART RATE: 73 BPM

## 2018-01-14 VITALS
RESPIRATION RATE: 18 BRPM | TEMPERATURE: 95.5 F | DIASTOLIC BLOOD PRESSURE: 62 MMHG | BODY MASS INDEX: 19.39 KG/M2 | SYSTOLIC BLOOD PRESSURE: 124 MMHG | HEART RATE: 69 BPM | OXYGEN SATURATION: 96 % | WEIGHT: 106 LBS

## 2018-01-14 VITALS
HEART RATE: 86 BPM | OXYGEN SATURATION: 98 % | TEMPERATURE: 97.2 F | RESPIRATION RATE: 16 BRPM | BODY MASS INDEX: 19.51 KG/M2 | HEIGHT: 62 IN | WEIGHT: 106 LBS | SYSTOLIC BLOOD PRESSURE: 106 MMHG | DIASTOLIC BLOOD PRESSURE: 70 MMHG

## 2018-01-23 VITALS
OXYGEN SATURATION: 98 % | BODY MASS INDEX: 19.52 KG/M2 | HEIGHT: 62 IN | WEIGHT: 106.06 LBS | RESPIRATION RATE: 18 BRPM | DIASTOLIC BLOOD PRESSURE: 70 MMHG | TEMPERATURE: 97.1 F | SYSTOLIC BLOOD PRESSURE: 110 MMHG | HEART RATE: 72 BPM

## 2018-01-29 DIAGNOSIS — Z01.419 ENCOUNTER FOR CERVICAL PAP SMEAR WITH PELVIC EXAM: Primary | ICD-10-CM

## 2018-02-08 PROBLEM — M25.571 CHRONIC PAIN OF RIGHT ANKLE: Status: ACTIVE | Noted: 2017-04-04

## 2018-02-08 PROBLEM — G89.29 CHRONIC PAIN OF RIGHT ANKLE: Status: ACTIVE | Noted: 2017-04-04

## 2018-03-01 ENCOUNTER — OFFICE VISIT (OUTPATIENT)
Dept: FAMILY MEDICINE CLINIC | Facility: CLINIC | Age: 53
End: 2018-03-01
Payer: MEDICARE

## 2018-03-01 VITALS
BODY MASS INDEX: 19.69 KG/M2 | TEMPERATURE: 96.2 F | DIASTOLIC BLOOD PRESSURE: 78 MMHG | WEIGHT: 107 LBS | RESPIRATION RATE: 98 BRPM | HEIGHT: 62 IN | SYSTOLIC BLOOD PRESSURE: 116 MMHG | HEART RATE: 99 BPM

## 2018-03-01 DIAGNOSIS — M79.7 FIBROMYALGIA: Primary | ICD-10-CM

## 2018-03-01 DIAGNOSIS — K21.9 GASTROESOPHAGEAL REFLUX DISEASE WITHOUT ESOPHAGITIS: ICD-10-CM

## 2018-03-01 PROCEDURE — 99213 OFFICE O/P EST LOW 20 MIN: CPT | Performed by: FAMILY MEDICINE

## 2018-03-01 NOTE — PROGRESS NOTES
OFFICE VISIT  Alan Montesinos 50 y o  female MRN: [id number]      Assessment / Plan:  Diagnoses and all orders for this visit:    Fibromyalgia    Gastroesophageal reflux disease without esophagitis          Reason For Visit / Chief Complaint  Chief Complaint   Patient presents with    Follow-up     She is here to discuss paperwork        HPI:  Alan Montesinos is a 50 y o  female presents today requesting paper work for completion for insurance forms  She is requesting a note from our office because she did not work Sept, Nov, Dec due to increase pain  She is established with pain management, she has known fibromyalgia  She was working two jobs, has since decreased to one  She reports her acid reflux is under good control       Historical Information   Past Medical History:   Diagnosis Date    Back problem      Past Surgical History:   Procedure Laterality Date    BREAST SURGERY      DILATION AND CURETTAGE OF UTERUS      LEG SURGERY Left     LEG SURGERY      SHOULDER ARTHROSCOPY       Social History   History   Alcohol Use    Yes     Comment: occassional     History   Drug Use No     History   Smoking Status    Former Smoker   Smokeless Tobacco    Never Used     Family History   Problem Relation Age of Onset    Breast cancer Paternal Grandmother     Colon cancer Paternal Grandmother     Arthritis Family     Diabetes Family     Hypertension Family     Osteoporosis Family     Ovarian cancer Family     Uterine cancer Family        Meds/Allergies   Allergies   Allergen Reactions    Amitriptyline      Other reaction(s): Constipation, Dry mouth    Pregabalin Rash and Throat Swelling       Meds:    Current Outpatient Prescriptions:     Calcium Carbonate-Vitamin D (CALCIUM 500 + D PO), Take 1 tablet by mouth daily, Disp: , Rfl:     Docusate Calcium (STOOL SOFTENER PO), Take 1 capsule by mouth daily, Disp: , Rfl:     DULoxetine (CYMBALTA) 30 mg delayed release capsule, Take 20 mg by mouth daily, Disp: , Rfl:   ferrous sulfate 325 (65 Fe) mg tablet, Take 325 mg by mouth daily with breakfast, Disp: , Rfl:     gabapentin (NEURONTIN) 300 mg capsule, Take 100 mg by mouth daily, Disp: , Rfl:     meloxicam (MOBIC) 15 mg tablet, Take 15 mg by mouth daily, Disp: , Rfl:     tobramycin-dexamethasone (TOBRADEX) ophthalmic ointment, Use small amt  ointment on skin around eye area  BID, Disp: 3 5 g, Rfl: 0      REVIEW OF SYSTEMS  A comprehensive review of systems was negative  Current Vitals:   Blood Pressure: 116/78 (03/01/18 1008)  Pulse: 99 (03/01/18 1008)  Temperature: (!) 96 2 °F (35 7 °C) (03/01/18 1008)  Respirations: (!) 98 (03/01/18 1008)  Height: 5' 2" (157 5 cm) (03/01/18 1008)  Weight - Scale: 48 5 kg (107 lb) (03/01/18 1008)  [unfilled]    PHYSICAL EXAMS:  General appearance: alert and oriented, in no acute distress  Lungs: clear to auscultation bilaterally  Heart: regular rate and rhythm, S1, S2 normal, no murmur, click, rub or gallop  Extremities: extremities normal, warm and well-perfused; no cyanosis, clubbing, or edema  Pulses: 2+ and symmetric  Skin: Skin color, texture, turgor normal  No rashes or lesions  Neurologic: Grossly normal{YES/NO:20        Follow up at this office in July, regular scheduled appt  Counseling / Coordination of Care  Total floor / unit time spent today 20 minutes  Greater than 50% of total time was spent with the patient and / or family counseling and / or coordination of care

## 2018-03-01 NOTE — LETTER
March 1, 2018     Patient: Chevy Romero   YOB: 1965   Date of Visit: 3/1/2018       To Whom it May Concern:    Chevy Romero is under my professional care  She was seen in my office on 3/1/2018  She may return to work on 3/1/18  Pt was unable to work for the month of September, November, and December for medical reasons  If you have any questions or concerns, please don't hesitate to call           Sincerely,          RENEE Glass        CC: No Recipients

## 2018-04-17 DIAGNOSIS — F32.A DEPRESSION, UNSPECIFIED DEPRESSION TYPE: ICD-10-CM

## 2018-04-17 DIAGNOSIS — M19.90 ARTHRITIS: Primary | ICD-10-CM

## 2018-04-17 DIAGNOSIS — M54.2 CERVICAL SPINE PAIN: ICD-10-CM

## 2018-04-17 RX ORDER — DULOXETIN HYDROCHLORIDE 30 MG/1
30 CAPSULE, DELAYED RELEASE ORAL DAILY
Qty: 90 CAPSULE | Refills: 0 | Status: SHIPPED | OUTPATIENT
Start: 2018-04-17 | End: 2018-09-26 | Stop reason: SDUPTHER

## 2018-04-17 RX ORDER — MELOXICAM 15 MG/1
15 TABLET ORAL DAILY
Qty: 90 TABLET | Refills: 0 | Status: SHIPPED | OUTPATIENT
Start: 2018-04-17 | End: 2018-08-11 | Stop reason: SDUPTHER

## 2018-04-17 RX ORDER — GABAPENTIN 300 MG/1
300 CAPSULE ORAL 2 TIMES DAILY
Qty: 180 CAPSULE | Refills: 0 | Status: SHIPPED | OUTPATIENT
Start: 2018-04-17 | End: 2018-10-22 | Stop reason: SDUPTHER

## 2018-05-16 ENCOUNTER — OFFICE VISIT (OUTPATIENT)
Dept: FAMILY MEDICINE CLINIC | Facility: CLINIC | Age: 53
End: 2018-05-16
Payer: MEDICARE

## 2018-05-16 VITALS
OXYGEN SATURATION: 97 % | HEIGHT: 62 IN | SYSTOLIC BLOOD PRESSURE: 110 MMHG | BODY MASS INDEX: 19.32 KG/M2 | RESPIRATION RATE: 16 BRPM | DIASTOLIC BLOOD PRESSURE: 72 MMHG | WEIGHT: 105 LBS | TEMPERATURE: 96.5 F | HEART RATE: 84 BPM

## 2018-05-16 DIAGNOSIS — K13.0 ANGULAR CHEILITIS: Primary | ICD-10-CM

## 2018-05-16 PROCEDURE — 99213 OFFICE O/P EST LOW 20 MIN: CPT | Performed by: FAMILY MEDICINE

## 2018-05-16 RX ORDER — CLOTRIMAZOLE 1 %
CREAM (GRAM) TOPICAL 2 TIMES DAILY
Qty: 30 G | Refills: 0 | Status: SHIPPED | OUTPATIENT
Start: 2018-05-16 | End: 2018-10-02 | Stop reason: CLARIF

## 2018-05-16 NOTE — PROGRESS NOTES
OFFICE VISIT  Moreno Aguilar 50 y o  female MRN: [id number]      Assessment / Plan:  Diagnoses and all orders for this visit:    Angular cheilitis  -     clotrimazole (LOTRIMIN) 1 % cream; Apply topically 2 (two) times a day          Reason For Visit / Chief Complaint  Chief Complaint   Patient presents with    Mouth Lesions        HPI:  Moreno Aguilar is a 50 y o  female presents today for mouth sores  She has small sore to both corners of her mouth, she reports them getting better today  She reports pain when eating and drinking       Historical Information   Past Medical History:   Diagnosis Date    Back problem      Past Surgical History:   Procedure Laterality Date    BREAST SURGERY      DILATION AND CURETTAGE OF UTERUS      LEG SURGERY Left     LEG SURGERY      SHOULDER ARTHROSCOPY       Social History   History   Alcohol Use    Yes     Comment: occassional     History   Drug Use No     History   Smoking Status    Former Smoker   Smokeless Tobacco    Never Used     Family History   Problem Relation Age of Onset    Breast cancer Paternal Grandmother     Colon cancer Paternal Grandmother     Arthritis Family     Diabetes Family     Hypertension Family     Osteoporosis Family     Ovarian cancer Family     Uterine cancer Family        Meds/Allergies   Allergies   Allergen Reactions    Amitriptyline      Other reaction(s): Constipation, Dry mouth    Gabapentin        Meds:    Current Outpatient Prescriptions:     Docusate Calcium (STOOL SOFTENER PO), Take 1 capsule by mouth daily, Disp: , Rfl:     DULoxetine (CYMBALTA) 30 mg delayed release capsule, Take 1 capsule (30 mg total) by mouth daily, Disp: 90 capsule, Rfl: 0    gabapentin (NEURONTIN) 300 mg capsule, Take 1 capsule (300 mg total) by mouth 2 (two) times a day, Disp: 180 capsule, Rfl: 0    meloxicam (MOBIC) 15 mg tablet, Take 1 tablet (15 mg total) by mouth daily, Disp: 90 tablet, Rfl: 0    Calcium Carbonate-Vitamin D (CALCIUM 500 + D PO), Take 1 tablet by mouth daily, Disp: , Rfl:     clotrimazole (LOTRIMIN) 1 % cream, Apply topically 2 (two) times a day, Disp: 30 g, Rfl: 0    ferrous sulfate 325 (65 Fe) mg tablet, Take 325 mg by mouth daily with breakfast, Disp: , Rfl:       REVIEW OF SYSTEMS  A comprehensive review of systems was negative except for: Integument/breast: positive for skin lesion(s)      Current Vitals:   Blood Pressure: 110/72 (05/16/18 1403)  Pulse: 84 (05/16/18 1403)  Temperature: (!) 96 5 °F (35 8 °C) (05/16/18 1403)  Temp Source: Tympanic (05/16/18 1403)  Respirations: 16 (05/16/18 1403)  Height: 5' 2" (157 5 cm) (05/16/18 1403)  Weight - Scale: 47 6 kg (105 lb) (05/16/18 1403)  SpO2: 97 % (05/16/18 1403)  [unfilled]    PHYSICAL EXAMS:  {YES/NO:20        Follow up at this office in as needed     Counseling / Coordination of Care  Total floor / unit time spent today 20- minutes  Greater than 50% of total time was spent with the patient and / or family counseling and / or coordination of care

## 2018-05-24 LAB
ANION GAP SERPL CALCULATED.3IONS-SCNC: 9 MM/L
APTT PPP: 31.6 SEC (ref 24.4–37.6)
BASOPHILS # BLD AUTO: 0 X3/UL (ref 0–0.3)
BASOPHILS # BLD AUTO: 0.3 % (ref 0–2)
BUN SERPL-MCNC: 7 MG/DL (ref 7–25)
CHLORIDE SERPL-SCNC: 100 MM/L (ref 98–107)
CO2 SERPL-SCNC: 30 MM/L (ref 21–31)
DEPRECATED RDW RBC AUTO: 12.7 % (ref 11.5–14.5)
EOSINOPHIL # BLD AUTO: 0.2 X3/UL (ref 0–0.5)
EOSINOPHIL NFR BLD AUTO: 2.9 % (ref 0–5)
HCG, QUALITATIVE (HISTORICAL): NEGATIVE
HCT VFR BLD AUTO: 39.1 % (ref 37–47)
HGB BLD-MCNC: 13.6 G/DL (ref 12–16)
INR PPP: 1.01 (ref 0.9–1.5)
LYMPHOCYTES # BLD AUTO: 2.6 X3/UL (ref 1.2–4.2)
LYMPHOCYTES NFR BLD AUTO: 40.9 % (ref 20.5–51.1)
MCH RBC QN AUTO: 31.3 PG (ref 26–34)
MCHC RBC AUTO-ENTMCNC: 34.7 G/DL (ref 31–36)
MCV RBC AUTO: 90 FL (ref 81–99)
MONOCYTES # BLD AUTO: 0.4 X3/UL (ref 0–1)
MONOCYTES NFR BLD AUTO: 6.9 % (ref 1.7–12)
NEUTROPHILS # BLD AUTO: 3.1 X3/UL (ref 1.4–6.5)
NEUTS SEG NFR BLD AUTO: 49 % (ref 42.2–75.2)
PLATELET # BLD AUTO: 263 X3/UL (ref 130–400)
PMV BLD AUTO: 6.3 FL (ref 8.6–11.7)
POTASSIUM SERPL-SCNC: 3.9 MM/L (ref 3.5–5.5)
PROTHROMBIN TIME (HISTORICAL): 11.7 SEC (ref 10.1–12.9)
RBC # BLD AUTO: 4.34 X6/UL (ref 3.9–5.2)
SODIUM SERPL-SCNC: 135 MM/L (ref 134–143)
T4 FREE SERPL-MCNC: 0.62 NG/DL (ref 0.6–1.7)
TSH SERPL DL<=0.05 MIU/L-ACNC: 2.18 UIU/M (ref 0.45–5.33)
WBC # BLD AUTO: 6.4 X3/UL (ref 4.8–10.8)

## 2018-05-29 LAB
PREGNANCY TEST URINE (HISTORICAL): NEGATIVE
SP GR UR STRIP.AUTO: <= 1.005 (ref 1–1.03)

## 2018-07-31 ENCOUNTER — OFFICE VISIT (OUTPATIENT)
Dept: FAMILY MEDICINE CLINIC | Facility: CLINIC | Age: 53
End: 2018-07-31
Payer: MEDICARE

## 2018-07-31 VITALS
SYSTOLIC BLOOD PRESSURE: 112 MMHG | DIASTOLIC BLOOD PRESSURE: 78 MMHG | OXYGEN SATURATION: 99 % | WEIGHT: 103 LBS | BODY MASS INDEX: 18.95 KG/M2 | TEMPERATURE: 97.8 F | HEIGHT: 62 IN | RESPIRATION RATE: 17 BRPM | HEART RATE: 94 BPM

## 2018-07-31 DIAGNOSIS — G89.29 CHRONIC MIDLINE LOW BACK PAIN WITHOUT SCIATICA: ICD-10-CM

## 2018-07-31 DIAGNOSIS — Z12.31 SCREENING MAMMOGRAM, ENCOUNTER FOR: ICD-10-CM

## 2018-07-31 DIAGNOSIS — F33.42 RECURRENT MAJOR DEPRESSIVE DISORDER, IN FULL REMISSION (HCC): Primary | ICD-10-CM

## 2018-07-31 DIAGNOSIS — M54.50 CHRONIC MIDLINE LOW BACK PAIN WITHOUT SCIATICA: ICD-10-CM

## 2018-07-31 PROCEDURE — 99213 OFFICE O/P EST LOW 20 MIN: CPT | Performed by: FAMILY MEDICINE

## 2018-07-31 RX ORDER — METHOCARBAMOL 500 MG/1
500 TABLET, FILM COATED ORAL 4 TIMES DAILY
Qty: 30 TABLET | Refills: 0 | Status: SHIPPED | OUTPATIENT
Start: 2018-07-31 | End: 2018-08-01

## 2018-07-31 NOTE — PROGRESS NOTES
OFFICE VISIT  Hossein Conway 50 y o  female MRN: [id number]      Assessment / Plan:  Diagnoses and all orders for this visit:    Recurrent major depressive disorder, in full remission ([facility] [address] )    Chronic midline low back pain without sciatica  -     methocarbamol (ROBAXIN) 500 mg tablet; Take 1 tablet (500 mg total) by mouth 4 (four) times a day    Screening mammogram, encounter for  -     Mammo screening bilateral w cad; Future    Due in September       Reason For Visit / Chief Complaint  Chief Complaint   Patient presents with    Well Check     back pain        HPI:  Hossein Conway is a 50 y o  female presents today for back pain  She is established with pain management, with recent injection  She is on meloxicam  Her pain is elevated today  She is unable to sit at todays appt  She is using her tens unit and stretching  She will be re-assessed for disability and is stressed  She had a recent pap with dr Anthony Matute  Mammogram due in September  She has known depression, controlled on Cymbalta        Historical Information   Past Medical History:   Diagnosis Date    Back problem      Past Surgical History:   Procedure Laterality Date    BREAST SURGERY      DILATION AND CURETTAGE OF UTERUS      LEG SURGERY Left     LEG SURGERY      SHOULDER ARTHROSCOPY       Social History   History   Alcohol Use    Yes     Comment: occassional     History   Drug Use No     History   Smoking Status    Former Smoker   Smokeless Tobacco    Never Used     Family History   Problem Relation Age of Onset    Breast cancer Paternal Grandmother    Lyndsey Bajwa Colon cancer Paternal Grandmother     Arthritis Family     Diabetes Family     Hypertension Family     Osteoporosis Family     Ovarian cancer Family     Uterine cancer Family        Meds/Allergies   Allergies   Allergen Reactions    Amitriptyline      Other reaction(s): Constipation, Dry mouth    Gabapentin        Meds:    Current Outpatient Prescriptions:     Calcium Carbonate-Vitamin D (CALCIUM 500 + D PO), Take 1 tablet by mouth daily, Disp: , Rfl:     clotrimazole (LOTRIMIN) 1 % cream, Apply topically 2 (two) times a day, Disp: 30 g, Rfl: 0    Docusate Calcium (STOOL SOFTENER PO), Take 1 capsule by mouth daily, Disp: , Rfl:     DULoxetine (CYMBALTA) 30 mg delayed release capsule, Take 1 capsule (30 mg total) by mouth daily, Disp: 90 capsule, Rfl: 0    ferrous sulfate 325 (65 Fe) mg tablet, Take 325 mg by mouth daily with breakfast, Disp: , Rfl:     gabapentin (NEURONTIN) 300 mg capsule, Take 1 capsule (300 mg total) by mouth 2 (two) times a day, Disp: 180 capsule, Rfl: 0    meloxicam (MOBIC) 15 mg tablet, Take 1 tablet (15 mg total) by mouth daily, Disp: 90 tablet, Rfl: 0    methocarbamol (ROBAXIN) 500 mg tablet, Take 1 tablet (500 mg total) by mouth 4 (four) times a day, Disp: 30 tablet, Rfl: 0      REVIEW OF SYSTEMS  Constitutional: negative  Eyes: negative  Ears, nose, mouth, throat, and face: negative  Respiratory: negative  Cardiovascular: negative  Gastrointestinal: negative  Integument/breast: negative  Hematologic/lymphatic: negative  Musculoskeletal:positive for arthralgias, back pain and stiff joints  Neurological: negative  Behavioral/Psych: negative  Endocrine: negative      Current Vitals:   Blood Pressure: 112/78 (07/31/18 1027)  Pulse: 94 (07/31/18 1027)  Temperature: 97 8 °F (36 6 °C) (07/31/18 1027)  Respirations: 17 (07/31/18 1027)  Height: 5' 2" (157 5 cm) (07/31/18 1027)  Weight - Scale: 46 7 kg (103 lb) (07/31/18 1027)  SpO2: 99 % (07/31/18 1027)  [unfilled]    PHYSICAL EXAMS:  General appearance: alert and oriented, in no acute distress  Head: Normocephalic, without obvious abnormality, atraumatic  Eyes: conjunctivae/corneas clear  PERRL, EOM's intact  Fundi benign  Ears: normal TM's and external ear canals both ears  Nose: Nares normal  Septum midline  Mucosa normal  No drainage or sinus tenderness    Throat: lips, mucosa, and tongue normal; teeth and gums normal  Lungs: clear to auscultation bilaterally  Heart: regular rate and rhythm, S1, S2 normal, no murmur, click, rub or gallop  Extremities: extremities normal, warm and well-perfused; no cyanosis, clubbing, or edema  Pulses: 2+ and symmetric  Skin: Skin color, texture, turgor normal  No rashes or lesions  Lymph nodes: Cervical, supraclavicular, and axillary nodes normal   Neurologic: Grossly normal{YES/NO:20  Mid low back tenderness        Follow up at this office in 6 months     Counseling / Coordination of Care  Total floor / unit time spent today 20 minutes  Greater than 50% of total time was spent with the patient and / or family counseling and / or coordination of care

## 2018-08-01 DIAGNOSIS — M62.838 MUSCLE SPASM: Primary | ICD-10-CM

## 2018-08-01 RX ORDER — TIZANIDINE 2 MG/1
4 TABLET ORAL EVERY 8 HOURS PRN
Qty: 30 TABLET | Refills: 0 | Status: SHIPPED | OUTPATIENT
Start: 2018-08-01 | End: 2019-06-06

## 2018-08-11 DIAGNOSIS — M19.90 ARTHRITIS: ICD-10-CM

## 2018-08-13 RX ORDER — MELOXICAM 15 MG/1
TABLET ORAL
Qty: 90 TABLET | Refills: 0 | Status: SHIPPED | OUTPATIENT
Start: 2018-08-13 | End: 2018-11-05 | Stop reason: SDUPTHER

## 2018-09-26 DIAGNOSIS — F32.A DEPRESSION, UNSPECIFIED DEPRESSION TYPE: ICD-10-CM

## 2018-09-26 DIAGNOSIS — M25.511 RIGHT SHOULDER PAIN, UNSPECIFIED CHRONICITY: Primary | ICD-10-CM

## 2018-09-26 RX ORDER — DULOXETIN HYDROCHLORIDE 30 MG/1
30 CAPSULE, DELAYED RELEASE ORAL DAILY
Qty: 90 CAPSULE | Refills: 1 | Status: SHIPPED | OUTPATIENT
Start: 2018-09-26 | End: 2018-10-03 | Stop reason: SDUPTHER

## 2018-10-01 ENCOUNTER — TRANSCRIBE ORDERS (OUTPATIENT)
Dept: ADMINISTRATIVE | Facility: HOSPITAL | Age: 53
End: 2018-10-01

## 2018-10-01 DIAGNOSIS — Z12.31 VISIT FOR SCREENING MAMMOGRAM: Primary | ICD-10-CM

## 2018-10-02 ENCOUNTER — APPOINTMENT (OUTPATIENT)
Dept: RADIOLOGY | Facility: MEDICAL CENTER | Age: 53
End: 2018-10-02
Payer: MEDICARE

## 2018-10-02 ENCOUNTER — OFFICE VISIT (OUTPATIENT)
Dept: OBGYN CLINIC | Facility: CLINIC | Age: 53
End: 2018-10-02
Payer: MEDICARE

## 2018-10-02 VITALS
HEIGHT: 62 IN | DIASTOLIC BLOOD PRESSURE: 68 MMHG | BODY MASS INDEX: 18.95 KG/M2 | HEART RATE: 71 BPM | WEIGHT: 103 LBS | SYSTOLIC BLOOD PRESSURE: 113 MMHG

## 2018-10-02 DIAGNOSIS — M19.011 ARTHRITIS OF RIGHT SHOULDER REGION: Primary | ICD-10-CM

## 2018-10-02 DIAGNOSIS — M19.011 ARTHRITIS OF RIGHT SHOULDER REGION: ICD-10-CM

## 2018-10-02 PROCEDURE — 99203 OFFICE O/P NEW LOW 30 MIN: CPT | Performed by: ORTHOPAEDIC SURGERY

## 2018-10-02 PROCEDURE — 20610 DRAIN/INJ JOINT/BURSA W/O US: CPT | Performed by: PHYSICIAN ASSISTANT

## 2018-10-02 PROCEDURE — 73030 X-RAY EXAM OF SHOULDER: CPT

## 2018-10-02 RX ORDER — LIDOCAINE HYDROCHLORIDE 10 MG/ML
2 INJECTION, SOLUTION INFILTRATION; PERINEURAL
Status: COMPLETED | OUTPATIENT
Start: 2018-10-02 | End: 2018-10-02

## 2018-10-02 RX ORDER — BETAMETHASONE SODIUM PHOSPHATE AND BETAMETHASONE ACETATE 3; 3 MG/ML; MG/ML
6 INJECTION, SUSPENSION INTRA-ARTICULAR; INTRALESIONAL; INTRAMUSCULAR; SOFT TISSUE
Status: COMPLETED | OUTPATIENT
Start: 2018-10-02 | End: 2018-10-02

## 2018-10-02 RX ADMIN — LIDOCAINE HYDROCHLORIDE 2 ML: 10 INJECTION, SOLUTION INFILTRATION; PERINEURAL at 10:58

## 2018-10-02 RX ADMIN — BETAMETHASONE SODIUM PHOSPHATE AND BETAMETHASONE ACETATE 6 MG: 3; 3 INJECTION, SUSPENSION INTRA-ARTICULAR; INTRALESIONAL; INTRAMUSCULAR; SOFT TISSUE at 10:58

## 2018-10-02 NOTE — PROGRESS NOTES
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  She has previously gotten neck injections from pain management  She stop those because she feels she puts more wear and tear body of she has no pain  She denies any numbness or tingling in her right arm  She denies any recent injury trauma or fall  Activity seems to aggravate and rest seems to alleviate her symptoms      Review of Systems  Review of systems negative unless otherwise specified in HPI    Past Medical History  Past Medical History:   Diagnosis Date    Back problem     Shoulder pain      Past Surgical History  Past Surgical History:   Procedure Laterality Date    BREAST SURGERY      DILATION AND CURETTAGE OF UTERUS      LEG SURGERY Left     LEG SURGERY      SHOULDER ARTHROSCOPY       Current Medications  Current Outpatient Prescriptions on File Prior to Visit   Medication Sig Dispense Refill    Calcium Carbonate-Vitamin D (CALCIUM 500 + D PO) Take 1 tablet by mouth daily      Docusate Calcium (STOOL SOFTENER PO) Take 1 capsule by mouth daily      DULoxetine (CYMBALTA) 30 mg delayed release capsule Take 1 capsule (30 mg total) by mouth daily 90 capsule 1    ferrous sulfate 325 (65 Fe) mg tablet Take 325 mg by mouth daily with breakfast      gabapentin (NEURONTIN) 300 mg capsule Take 1 capsule (300 mg total) by mouth 2 (two) times a day 180 capsule 0    meloxicam (MOBIC) 15 mg tablet take 1 tablet by mouth once daily 90 tablet 0    tiZANidine (ZANAFLEX) 2 mg tablet Take 2 tablets (4 mg total) by mouth every 8 (eight) hours as needed for muscle spasms 30 tablet 0    [DISCONTINUED] clotrimazole (LOTRIMIN) 1 % cream Apply topically 2 (two) times a day (Patient not taking: Reported on 10/2/2018 ) 30 g 0     No current facility-administered medications on file prior to visit  Recent Labs (HCT,HGB,PT,INR,ESR,CRP,GLU,HgA1C)    0  Lab Value Date/Time   HCT 39 1 05/24/2018 1041   HGB 13 6 05/24/2018 1041   WBC 6 4 05/24/2018 1041   INR 1 01 05/24/2018 1041   GLUCOSE 78 07/27/2015 1049     Physical exam  · General: Awake, Alert, Oriented  · Eyes: Pupils equal, round and reactive to light  · Heart: regular rate and rhythm  · Lungs: No audible wheezing  · Abdomen: soft  right Shoulder  ·   She maintains full flexion, extension, internal rotation  She lacks last 5 degrees of external rotation with the arm at 90 degrees of abduction  She has no pain with resisted  Elbow flexion and supination  She has a negative Neer's, negative Arvizu, negative Nell's negative speed's test   Sensation intact  Radial pulse 4/4  Pain located near the glenohumeral joint anteriorly  Imaging   x-rays taken in the office today note glenohumeral joint arthritis of the right shoulder  Is maintained and no button from previous biceps tenodesis  No dislocation      1  Arthritis of right shoulder region      Assessment:  right glenohumeral arthritis    Large joint arthrocentesis  Date/Time: 10/2/2018 10:58 AM  Consent given by: patient  Timeout: Immediately prior to procedure a time out was called to verify the correct patient, procedure, equipment, support staff and site/side marked as required   Supporting Documentation  Indications: pain   Procedure Details  Location: shoulder - R glenohumeral  Preparation: Patient was prepped and draped in the usual sterile fashion  Needle size: 22 G  Ultrasound guidance: no  Approach: posterior  Medications administered: 6 mg betamethasone acetate-betamethasone sodium phosphate 6 (3-3) mg/mL; 2 mL lidocaine 1 %    Patient tolerance: patient tolerated the procedure well with no immediate complications  Dressing:  Sterile dressing applied    Plan:  Patient received cortisone injection into the shoulder joint today in the office  She will ice her shoulder 20 minutes 2 times today and then as needed for discomfort  She did not want schedule formal appointment will be seen on an as-needed basis for follow-up  She is continue using her shoulder to maintain her range of motion  She may use the shoulder unrestricted  Tylenol for any discomfort

## 2018-10-02 NOTE — PATIENT INSTRUCTIONS
Patient received cortisone injection into the shoulder joint today in the office  She will ice her shoulder 20 minutes 2 times today and then as needed for discomfort  She did not want schedule formal appointment will be seen on an as-needed basis for follow-up  She is continue using her shoulder to maintain her range of motion  She may use the shoulder unrestricted  Tylenol for any discomfort

## 2018-10-03 ENCOUNTER — HOSPITAL ENCOUNTER (OUTPATIENT)
Dept: MAMMOGRAPHY | Facility: HOSPITAL | Age: 53
Discharge: HOME | End: 2018-10-03
Payer: MEDICARE

## 2018-10-03 DIAGNOSIS — Z12.31 VISIT FOR SCREENING MAMMOGRAM: ICD-10-CM

## 2018-10-03 DIAGNOSIS — Z12.31 SCREENING MAMMOGRAM, ENCOUNTER FOR: ICD-10-CM

## 2018-10-03 DIAGNOSIS — F32.A DEPRESSION, UNSPECIFIED DEPRESSION TYPE: ICD-10-CM

## 2018-10-03 PROCEDURE — 77067 SCR MAMMO BI INCL CAD: CPT

## 2018-10-03 PROCEDURE — 77063 BREAST TOMOSYNTHESIS BI: CPT

## 2018-10-03 RX ORDER — DULOXETIN HYDROCHLORIDE 30 MG/1
30 CAPSULE, DELAYED RELEASE ORAL DAILY
Qty: 90 CAPSULE | Refills: 0 | Status: SHIPPED | OUTPATIENT
Start: 2018-10-03 | End: 2018-12-26 | Stop reason: SDUPTHER

## 2018-10-22 DIAGNOSIS — M54.2 CERVICAL SPINE PAIN: ICD-10-CM

## 2018-10-22 RX ORDER — GABAPENTIN 300 MG/1
300 CAPSULE ORAL 2 TIMES DAILY
Qty: 180 CAPSULE | Refills: 0 | Status: SHIPPED | OUTPATIENT
Start: 2018-10-22 | End: 2019-04-09 | Stop reason: SDUPTHER

## 2018-11-05 DIAGNOSIS — M19.90 ARTHRITIS: ICD-10-CM

## 2018-11-05 RX ORDER — MELOXICAM 15 MG/1
15 TABLET ORAL DAILY
Qty: 90 TABLET | Refills: 3 | Status: SHIPPED | OUTPATIENT
Start: 2018-11-05 | End: 2019-06-06 | Stop reason: SDUPTHER

## 2018-11-17 ENCOUNTER — OFFICE VISIT (OUTPATIENT)
Dept: URGENT CARE | Facility: CLINIC | Age: 53
End: 2018-11-17
Payer: MEDICARE

## 2018-11-17 VITALS
HEIGHT: 62 IN | OXYGEN SATURATION: 99 % | DIASTOLIC BLOOD PRESSURE: 52 MMHG | SYSTOLIC BLOOD PRESSURE: 106 MMHG | TEMPERATURE: 97.3 F | HEART RATE: 72 BPM | BODY MASS INDEX: 18.95 KG/M2 | WEIGHT: 103 LBS | RESPIRATION RATE: 16 BRPM

## 2018-11-17 DIAGNOSIS — T23.121A SUPERFICIAL BURN OF FINGER OF RIGHT HAND, INITIAL ENCOUNTER: Primary | ICD-10-CM

## 2018-11-17 PROCEDURE — 99213 OFFICE O/P EST LOW 20 MIN: CPT | Performed by: NURSE PRACTITIONER

## 2018-11-17 PROCEDURE — G0463 HOSPITAL OUTPT CLINIC VISIT: HCPCS | Performed by: NURSE PRACTITIONER

## 2018-11-17 NOTE — PROGRESS NOTES
[facility]        NAME: Alex Aguilar is a 48 y o  female  : 1965    MRN: [id number]  DATE: 2018  TIME: 2:38 PM    Assessment and Plan   Superficial burn of finger of right hand, initial encounter [T23 121A]  1  Superficial burn of finger of right hand, initial encounter           Patient Instructions     Instructed patient to continue to wash site twice daily antibacterial soap water and may apply bacitracin ointment twice a day  Follow up with PCP in 3-5 days  Proceed to  ER if symptoms worsen  Chief Complaint     Chief Complaint   Patient presents with    Wound Infection     right hand, x 2 weeks    Burn     2 weeks ago         History of Present Illness       61-year-old female presents to urgent care with chief complaint of burn right 3rd digit 1 week ago  She stated this burn occurred when she was cooking 7 days ago she is concerned that area is infected today as area is pink in color  She denies any fevers chills blisters to site has been washing site once daily and then applying triple antibiotic ointment to site      Burn   The incident occurred 5 to 7 days ago  The burns occurred at home  The burns occurred while cooking  The burns were a result of contact with a hot surface  The burns are located on the right fingers  The pain is at a severity of 1/10  The pain is mild  Treatments tried: Triple antibiotic ointment  The treatment provided mild relief  Review of Systems   Review of Systems   Constitutional: Negative  HENT: Negative  Eyes: Negative  Respiratory: Negative  Cardiovascular: Negative  Gastrointestinal: Negative  Endocrine: Negative  Genitourinary: Negative  Musculoskeletal: Negative  Skin: Positive for color change  Allergic/Immunologic: Negative  Neurological: Negative  Hematological: Negative  Psychiatric/Behavioral: Negative  All other systems reviewed and are negative          Current Medications Current Outpatient Prescriptions:     Calcium Carbonate-Vitamin D (CALCIUM 500 + D PO), Take 1 tablet by mouth daily, Disp: , Rfl:     Docusate Calcium (STOOL SOFTENER PO), Take 1 capsule by mouth daily, Disp: , Rfl:     DULoxetine (CYMBALTA) 30 mg delayed release capsule, Take 1 capsule (30 mg total) by mouth daily, Disp: 90 capsule, Rfl: 0    ferrous sulfate 325 (65 Fe) mg tablet, Take 325 mg by mouth daily with breakfast, Disp: , Rfl:     gabapentin (NEURONTIN) 300 mg capsule, Take 1 capsule (300 mg total) by mouth 2 (two) times a day, Disp: 180 capsule, Rfl: 0    meloxicam (MOBIC) 15 mg tablet, Take 1 tablet (15 mg total) by mouth daily, Disp: 90 tablet, Rfl: 3    tiZANidine (ZANAFLEX) 2 mg tablet, Take 2 tablets (4 mg total) by mouth every 8 (eight) hours as needed for muscle spasms, Disp: 30 tablet, Rfl: 0    Current Allergies     Allergies as of 11/17/2018 - Reviewed 11/17/2018   Allergen Reaction Noted    Amitriptyline  07/01/2013            The following portions of the patient's history were reviewed and updated as appropriate: allergies, current medications, past family history, past medical history, past social history, past surgical history and problem list      Past Medical History:   Diagnosis Date    Back problem     Shoulder pain        Past Surgical History:   Procedure Laterality Date    BREAST SURGERY      DILATION AND CURETTAGE OF UTERUS      LEG SURGERY Left     LEG SURGERY      SHOULDER ARTHROSCOPY         Family History   Problem Relation Age of Onset    Breast cancer Paternal Grandmother     Colon cancer Paternal Grandmother     Arthritis Family     Diabetes Family     Hypertension Family     Osteoporosis Family     Ovarian cancer Family     Uterine cancer Family          Medications have been verified          Objective   /52   Pulse 72   Temp (!) 97 3 °F (36 3 °C)   Resp 16   Ht 5' 2" (1 575 m)   Wt 46 7 kg (103 lb)   SpO2 99%   BMI 18 84 kg/m² Physical Exam     Physical Exam   Constitutional: She is oriented to person, place, and time  Vital signs are normal  She appears well-developed  HENT:   Head: Normocephalic and atraumatic  Right Ear: External ear normal    Left Ear: External ear normal    Nose: Nose normal    Mouth/Throat: Oropharynx is clear and moist    Eyes: Pupils are equal, round, and reactive to light  Conjunctivae and EOM are normal  Lids are everted and swept, no foreign bodies found  Neck: Normal range of motion  Neck supple  Cardiovascular: Normal rate, regular rhythm, normal heart sounds and intact distal pulses  Pulmonary/Chest: Effort normal and breath sounds normal    Abdominal: Soft  Normal appearance and bowel sounds are normal    Musculoskeletal: Normal range of motion  Neurological: She is alert and oriented to person, place, and time  She has normal reflexes  Skin: Skin is warm, dry and intact  There is erythema  Psychiatric: She has a normal mood and affect  Her speech is normal and behavior is normal  Judgment and thought content normal    Nursing note and vitals reviewed

## 2018-12-04 ENCOUNTER — OFFICE VISIT (OUTPATIENT)
Dept: FAMILY MEDICINE CLINIC | Facility: CLINIC | Age: 53
End: 2018-12-04
Payer: MEDICARE

## 2018-12-04 VITALS
RESPIRATION RATE: 17 BRPM | DIASTOLIC BLOOD PRESSURE: 66 MMHG | SYSTOLIC BLOOD PRESSURE: 104 MMHG | OXYGEN SATURATION: 97 % | BODY MASS INDEX: 18.95 KG/M2 | WEIGHT: 103 LBS | TEMPERATURE: 97.3 F | HEIGHT: 62 IN | HEART RATE: 65 BPM

## 2018-12-04 DIAGNOSIS — E55.9 VITAMIN D DEFICIENCY: Primary | ICD-10-CM

## 2018-12-04 DIAGNOSIS — R53.83 OTHER FATIGUE: ICD-10-CM

## 2018-12-04 DIAGNOSIS — Z13.1 SCREENING FOR DIABETES MELLITUS: ICD-10-CM

## 2018-12-04 DIAGNOSIS — E78.00 HYPERCHOLESTEREMIA: ICD-10-CM

## 2018-12-04 PROCEDURE — 99213 OFFICE O/P EST LOW 20 MIN: CPT | Performed by: FAMILY MEDICINE

## 2018-12-04 NOTE — PROGRESS NOTES
OFFICE VISIT  Saul Chopra 48 y o  female MRN: [id number]      Assessment / Plan:  Diagnoses and all orders for this visit:    Vitamin D deficiency  -     Vitamin D 25 hydroxy; Future    Hypercholesteremia  -     Lipid Panel with Direct LDL reflex; Future  -     Comprehensive metabolic panel; Future  -     Comprehensive metabolic panel; Future    Other fatigue  -     TSH, 3rd generation with Free T4 reflex; Future  -     CBC and differential; Future  -     Vitamin B12; Future    Screening for diabetes mellitus  -     HEMOGLOBIN A1C W/ EAG ESTIMATION; Future      Discuss with pain management handicap placard form  Complete labs as ordered  Reason For Visit / Chief Complaint  Chief Complaint   Patient presents with    Follow-up     She states she scratches her hands not realizing and cuts herself open and it takes a while to heal     Forms     She would like a handicapped spot for her home  HPI:  Saul Chopra is a 48 y o  female who presents today for handicap placard  She reports needing to park far away from her home, carrying groceries  She has known chronic back pain, est with pain management  She also seen in urgent care, for prolonged healing to scratch   Today, her scratch is resolved     Historical Information   Past Medical History:   Diagnosis Date    Back problem     Shoulder pain      Past Surgical History:   Procedure Laterality Date    BREAST SURGERY      DILATION AND CURETTAGE OF UTERUS      LEG SURGERY Left     LEG SURGERY      SHOULDER ARTHROSCOPY       Social History   History   Alcohol Use    Yes     Comment: occassional     History   Drug Use No     History   Smoking Status    Former Smoker   Smokeless Tobacco    Never Used     Family History   Problem Relation Age of Onset    Breast cancer Paternal Grandmother     Colon cancer Paternal Grandmother     Arthritis Family     Diabetes Family     Hypertension Family     Osteoporosis Family     Ovarian cancer Family  Uterine cancer Family        Meds/Allergies   Allergies   Allergen Reactions    Amitriptyline      Other reaction(s): Constipation, Dry mouth       Meds:    Current Outpatient Prescriptions:     Calcium Carbonate-Vitamin D (CALCIUM 500 + D PO), Take 1 tablet by mouth daily, Disp: , Rfl:     Docusate Calcium (STOOL SOFTENER PO), Take 1 capsule by mouth daily, Disp: , Rfl:     DULoxetine (CYMBALTA) 30 mg delayed release capsule, Take 1 capsule (30 mg total) by mouth daily, Disp: 90 capsule, Rfl: 0    ferrous sulfate 325 (65 Fe) mg tablet, Take 325 mg by mouth daily with breakfast, Disp: , Rfl:     gabapentin (NEURONTIN) 300 mg capsule, Take 1 capsule (300 mg total) by mouth 2 (two) times a day, Disp: 180 capsule, Rfl: 0    meloxicam (MOBIC) 15 mg tablet, Take 1 tablet (15 mg total) by mouth daily, Disp: 90 tablet, Rfl: 3    tiZANidine (ZANAFLEX) 2 mg tablet, Take 2 tablets (4 mg total) by mouth every 8 (eight) hours as needed for muscle spasms, Disp: 30 tablet, Rfl: 0      REVIEW OF SYSTEMS  Review of Systems   Constitutional: Negative for chills, fatigue and fever  HENT: Negative for congestion, ear discharge, ear pain, sore throat, trouble swallowing and voice change  Eyes: Negative for pain and redness  Respiratory: Negative for cough, chest tightness, shortness of breath and wheezing  Gastrointestinal: Negative for abdominal pain, blood in stool, constipation, diarrhea, nausea and vomiting  Endocrine: Negative for cold intolerance, heat intolerance, polydipsia, polyphagia and polyuria  Genitourinary: Negative for decreased urine volume, dysuria, frequency and urgency  Musculoskeletal: Positive for back pain  Negative for arthralgias, myalgias and neck pain  Skin: Negative for color change and rash  Neurological: Negative for dizziness, syncope, weakness, light-headedness, numbness and headaches  Psychiatric/Behavioral: Negative for sleep disturbance and suicidal ideas   The patient is not nervous/anxious  Current Vitals:   Blood Pressure: 104/66 (12/04/18 0958)  Pulse: 65 (12/04/18 0958)  Temperature: (!) 97 3 °F (36 3 °C) (12/04/18 0958)  Respirations: 17 (12/04/18 0958)  Height: 5' 2" (157 5 cm) (12/04/18 0958)  Weight - Scale: 46 7 kg (103 lb) (12/04/18 0958)  SpO2: 97 % (12/04/18 0958)  [unfilled]    PHYSICAL EXAMS:  Physical Exam   Constitutional: She is oriented to person, place, and time  She appears well-developed and well-nourished  HENT:   Head: Normocephalic  Right Ear: External ear normal    Left Ear: External ear normal    Mouth/Throat: Oropharynx is clear and moist    Eyes: Pupils are equal, round, and reactive to light  Conjunctivae are normal    Neck: Neck supple  Cardiovascular: Normal rate and regular rhythm  Pulmonary/Chest: Effort normal and breath sounds normal    Abdominal: Soft  Bowel sounds are normal  She exhibits no distension  There is no tenderness  Musculoskeletal: Normal range of motion  Neurological: She is alert and oriented to person, place, and time  Skin: Skin is warm and dry  Psychiatric: She has a normal mood and affect  Follow up at this office in 6 months     Counseling / Coordination of Care  Total floor / unit time spent today 20 minutes  Greater than 50% of total time was spent with the patient and / or family counseling and / or coordination of care

## 2018-12-15 ENCOUNTER — APPOINTMENT (OUTPATIENT)
Dept: LAB | Facility: HOSPITAL | Age: 53
End: 2018-12-15
Payer: MEDICARE

## 2018-12-15 DIAGNOSIS — Z13.1 SCREENING FOR DIABETES MELLITUS: ICD-10-CM

## 2018-12-15 DIAGNOSIS — E55.9 VITAMIN D DEFICIENCY: ICD-10-CM

## 2018-12-15 DIAGNOSIS — R53.83 OTHER FATIGUE: ICD-10-CM

## 2018-12-15 DIAGNOSIS — E78.00 HYPERCHOLESTEREMIA: ICD-10-CM

## 2018-12-15 LAB
25(OH)D3 SERPL-MCNC: 27.4 NG/ML (ref 30–100)
ALBUMIN SERPL BCP-MCNC: 3.8 G/DL (ref 3.5–5)
ALP SERPL-CCNC: 34 U/L (ref 46–116)
ALT SERPL W P-5'-P-CCNC: 35 U/L (ref 12–78)
ANION GAP SERPL CALCULATED.3IONS-SCNC: 8 MMOL/L (ref 4–13)
AST SERPL W P-5'-P-CCNC: 15 U/L (ref 5–45)
BASOPHILS # BLD AUTO: 0.01 THOUSANDS/ΜL (ref 0–0.1)
BASOPHILS NFR BLD AUTO: 0 % (ref 0–1)
BILIRUB SERPL-MCNC: 0.5 MG/DL (ref 0.2–1)
BUN SERPL-MCNC: 10 MG/DL (ref 5–25)
CALCIUM SERPL-MCNC: 8.1 MG/DL (ref 8.3–10.1)
CHLORIDE SERPL-SCNC: 100 MMOL/L (ref 100–108)
CHOLEST SERPL-MCNC: 197 MG/DL (ref 50–200)
CO2 SERPL-SCNC: 26 MMOL/L (ref 21–32)
CREAT SERPL-MCNC: 0.47 MG/DL (ref 0.6–1.3)
EOSINOPHIL # BLD AUTO: 0.12 THOUSAND/ΜL (ref 0–0.61)
EOSINOPHIL NFR BLD AUTO: 2 % (ref 0–6)
ERYTHROCYTE [DISTWIDTH] IN BLOOD BY AUTOMATED COUNT: 12 % (ref 11.6–15.1)
EST. AVERAGE GLUCOSE BLD GHB EST-MCNC: 100 MG/DL
GFR SERPL CREATININE-BSD FRML MDRD: 115 ML/MIN/1.73SQ M
GLUCOSE P FAST SERPL-MCNC: 87 MG/DL (ref 65–99)
HBA1C MFR BLD: 5.1 % (ref 4.2–6.3)
HCT VFR BLD AUTO: 37.3 % (ref 34.8–46.1)
HDLC SERPL-MCNC: 76 MG/DL (ref 40–60)
HGB BLD-MCNC: 12.7 G/DL (ref 11.5–15.4)
IMM GRANULOCYTES # BLD AUTO: 0.01 THOUSAND/UL (ref 0–0.2)
IMM GRANULOCYTES NFR BLD AUTO: 0 % (ref 0–2)
LDLC SERPL CALC-MCNC: 106 MG/DL (ref 0–100)
LYMPHOCYTES # BLD AUTO: 1.84 THOUSANDS/ΜL (ref 0.6–4.47)
LYMPHOCYTES NFR BLD AUTO: 29 % (ref 14–44)
MCH RBC QN AUTO: 31.1 PG (ref 26.8–34.3)
MCHC RBC AUTO-ENTMCNC: 34 G/DL (ref 31.4–37.4)
MCV RBC AUTO: 91 FL (ref 82–98)
MONOCYTES # BLD AUTO: 0.53 THOUSAND/ΜL (ref 0.17–1.22)
MONOCYTES NFR BLD AUTO: 8 % (ref 4–12)
NEUTROPHILS # BLD AUTO: 3.81 THOUSANDS/ΜL (ref 1.85–7.62)
NEUTS SEG NFR BLD AUTO: 61 % (ref 43–75)
NRBC BLD AUTO-RTO: 0 /100 WBCS
PLATELET # BLD AUTO: 230 THOUSANDS/UL (ref 149–390)
PMV BLD AUTO: 8.7 FL (ref 8.9–12.7)
POTASSIUM SERPL-SCNC: 3.9 MMOL/L (ref 3.5–5.3)
PROT SERPL-MCNC: 6.3 G/DL (ref 6.4–8.2)
RBC # BLD AUTO: 4.08 MILLION/UL (ref 3.81–5.12)
SODIUM SERPL-SCNC: 134 MMOL/L (ref 136–145)
TRIGL SERPL-MCNC: 77 MG/DL
TSH SERPL DL<=0.05 MIU/L-ACNC: 2.35 UIU/ML (ref 0.36–3.74)
VIT B12 SERPL-MCNC: 694 PG/ML (ref 100–900)
WBC # BLD AUTO: 6.32 THOUSAND/UL (ref 4.31–10.16)

## 2018-12-15 PROCEDURE — 83036 HEMOGLOBIN GLYCOSYLATED A1C: CPT

## 2018-12-15 PROCEDURE — 82607 VITAMIN B-12: CPT

## 2018-12-15 PROCEDURE — 82306 VITAMIN D 25 HYDROXY: CPT

## 2018-12-15 PROCEDURE — 80053 COMPREHEN METABOLIC PANEL: CPT

## 2018-12-15 PROCEDURE — 84443 ASSAY THYROID STIM HORMONE: CPT

## 2018-12-15 PROCEDURE — 36415 COLL VENOUS BLD VENIPUNCTURE: CPT

## 2018-12-15 PROCEDURE — 80061 LIPID PANEL: CPT

## 2018-12-15 PROCEDURE — 85025 COMPLETE CBC W/AUTO DIFF WBC: CPT

## 2018-12-17 ENCOUNTER — TELEPHONE (OUTPATIENT)
Dept: FAMILY MEDICINE CLINIC | Facility: CLINIC | Age: 53
End: 2018-12-17

## 2018-12-17 NOTE — TELEPHONE ENCOUNTER
Patient is aware     ----- Message from [address] sent at 12/16/2018  6:40 PM EST -----   Labs reviewed, recommed 2000 units vit d, if not already taking   ----- Message -----  From: Lab, Background User  Sent: 12/15/2018   9:18 AM  To: [address]

## 2018-12-26 DIAGNOSIS — F32.A DEPRESSION, UNSPECIFIED DEPRESSION TYPE: ICD-10-CM

## 2018-12-26 RX ORDER — DULOXETIN HYDROCHLORIDE 30 MG/1
30 CAPSULE, DELAYED RELEASE ORAL DAILY
Qty: 90 CAPSULE | Refills: 0 | Status: SHIPPED | OUTPATIENT
Start: 2018-12-26 | End: 2019-03-19 | Stop reason: SDUPTHER

## 2019-03-19 DIAGNOSIS — F32.A DEPRESSION, UNSPECIFIED DEPRESSION TYPE: ICD-10-CM

## 2019-03-19 RX ORDER — DULOXETIN HYDROCHLORIDE 30 MG/1
30 CAPSULE, DELAYED RELEASE ORAL DAILY
Qty: 90 CAPSULE | Refills: 0 | Status: SHIPPED | OUTPATIENT
Start: 2019-03-19 | End: 2019-06-06 | Stop reason: SDUPTHER

## 2019-04-09 DIAGNOSIS — M54.2 CERVICAL SPINE PAIN: ICD-10-CM

## 2019-04-09 RX ORDER — GABAPENTIN 300 MG/1
300 CAPSULE ORAL 2 TIMES DAILY
Qty: 180 CAPSULE | Refills: 0 | Status: SHIPPED | OUTPATIENT
Start: 2019-04-09 | End: 2019-06-06 | Stop reason: SDUPTHER

## 2019-06-06 ENCOUNTER — OFFICE VISIT (OUTPATIENT)
Dept: FAMILY MEDICINE CLINIC | Facility: HOME HEALTHCARE | Age: 54
End: 2019-06-06
Payer: COMMERCIAL

## 2019-06-06 VITALS
OXYGEN SATURATION: 97 % | RESPIRATION RATE: 16 BRPM | SYSTOLIC BLOOD PRESSURE: 104 MMHG | TEMPERATURE: 98.5 F | HEIGHT: 62 IN | WEIGHT: 107 LBS | BODY MASS INDEX: 19.69 KG/M2 | HEART RATE: 97 BPM | DIASTOLIC BLOOD PRESSURE: 74 MMHG

## 2019-06-06 DIAGNOSIS — M54.2 CERVICAL SPINE PAIN: ICD-10-CM

## 2019-06-06 DIAGNOSIS — Z13.1 SCREENING FOR DIABETES MELLITUS: ICD-10-CM

## 2019-06-06 DIAGNOSIS — M19.90 ARTHRITIS: ICD-10-CM

## 2019-06-06 DIAGNOSIS — E78.00 HYPERCHOLESTEREMIA: ICD-10-CM

## 2019-06-06 DIAGNOSIS — F32.A DEPRESSION, UNSPECIFIED DEPRESSION TYPE: ICD-10-CM

## 2019-06-06 DIAGNOSIS — R53.83 OTHER FATIGUE: Primary | ICD-10-CM

## 2019-06-06 PROCEDURE — 99213 OFFICE O/P EST LOW 20 MIN: CPT | Performed by: NURSE PRACTITIONER

## 2019-06-06 RX ORDER — GABAPENTIN 300 MG/1
300 CAPSULE ORAL 2 TIMES DAILY
Qty: 180 CAPSULE | Refills: 2 | Status: SHIPPED | OUTPATIENT
Start: 2019-06-06 | End: 2020-10-12 | Stop reason: SDUPTHER

## 2019-06-06 RX ORDER — DULOXETIN HYDROCHLORIDE 30 MG/1
30 CAPSULE, DELAYED RELEASE ORAL DAILY
Qty: 90 CAPSULE | Refills: 2 | Status: SHIPPED | OUTPATIENT
Start: 2019-06-06 | End: 2020-03-11 | Stop reason: SDUPTHER

## 2019-06-06 RX ORDER — MELOXICAM 15 MG/1
15 TABLET ORAL DAILY
Qty: 90 TABLET | Refills: 2 | Status: SHIPPED | OUTPATIENT
Start: 2019-06-06 | End: 2020-04-27 | Stop reason: SDUPTHER

## 2019-06-06 RX ORDER — TIZANIDINE 2 MG/1
2 TABLET ORAL AS NEEDED
COMMUNITY
Start: 2018-08-01 | End: 2021-12-12 | Stop reason: SDUPTHER

## 2019-07-01 ENCOUNTER — APPOINTMENT (OUTPATIENT)
Dept: LAB | Facility: HOSPITAL | Age: 54
End: 2019-07-01
Payer: COMMERCIAL

## 2019-07-01 DIAGNOSIS — R53.83 OTHER FATIGUE: ICD-10-CM

## 2019-07-01 DIAGNOSIS — Z13.1 SCREENING FOR DIABETES MELLITUS: ICD-10-CM

## 2019-07-01 DIAGNOSIS — E78.00 HYPERCHOLESTEREMIA: ICD-10-CM

## 2019-07-01 LAB
25(OH)D3 SERPL-MCNC: 43.6 NG/ML (ref 30–100)
ALBUMIN SERPL BCP-MCNC: 3.6 G/DL (ref 3.5–5)
ALP SERPL-CCNC: 35 U/L (ref 46–116)
ALT SERPL W P-5'-P-CCNC: 22 U/L (ref 12–78)
ANION GAP SERPL CALCULATED.3IONS-SCNC: 7 MMOL/L (ref 4–13)
AST SERPL W P-5'-P-CCNC: 17 U/L (ref 5–45)
BASOPHILS # BLD AUTO: 0.02 THOUSANDS/ΜL (ref 0–0.1)
BASOPHILS NFR BLD AUTO: 0 % (ref 0–1)
BILIRUB SERPL-MCNC: 0.6 MG/DL (ref 0.2–1)
BUN SERPL-MCNC: 9 MG/DL (ref 5–25)
CALCIUM SERPL-MCNC: 9 MG/DL (ref 8.3–10.1)
CHLORIDE SERPL-SCNC: 99 MMOL/L (ref 100–108)
CHOLEST SERPL-MCNC: 218 MG/DL (ref 50–200)
CO2 SERPL-SCNC: 27 MMOL/L (ref 21–32)
CREAT SERPL-MCNC: 0.54 MG/DL (ref 0.6–1.3)
EOSINOPHIL # BLD AUTO: 0.15 THOUSAND/ΜL (ref 0–0.61)
EOSINOPHIL NFR BLD AUTO: 2 % (ref 0–6)
ERYTHROCYTE [DISTWIDTH] IN BLOOD BY AUTOMATED COUNT: 12.5 % (ref 11.6–15.1)
EST. AVERAGE GLUCOSE BLD GHB EST-MCNC: 97 MG/DL
GFR SERPL CREATININE-BSD FRML MDRD: 110 ML/MIN/1.73SQ M
GLUCOSE P FAST SERPL-MCNC: 82 MG/DL (ref 65–99)
HBA1C MFR BLD: 5 % (ref 4.2–6.3)
HCT VFR BLD AUTO: 37.5 % (ref 34.8–46.1)
HDLC SERPL-MCNC: 88 MG/DL (ref 40–60)
HGB BLD-MCNC: 12.7 G/DL (ref 11.5–15.4)
IMM GRANULOCYTES # BLD AUTO: 0.03 THOUSAND/UL (ref 0–0.2)
IMM GRANULOCYTES NFR BLD AUTO: 1 % (ref 0–2)
LDLC SERPL CALC-MCNC: 121 MG/DL (ref 0–100)
LYMPHOCYTES # BLD AUTO: 1.84 THOUSANDS/ΜL (ref 0.6–4.47)
LYMPHOCYTES NFR BLD AUTO: 28 % (ref 14–44)
MCH RBC QN AUTO: 32 PG (ref 26.8–34.3)
MCHC RBC AUTO-ENTMCNC: 33.9 G/DL (ref 31.4–37.4)
MCV RBC AUTO: 95 FL (ref 82–98)
MONOCYTES # BLD AUTO: 0.58 THOUSAND/ΜL (ref 0.17–1.22)
MONOCYTES NFR BLD AUTO: 9 % (ref 4–12)
NEUTROPHILS # BLD AUTO: 3.89 THOUSANDS/ΜL (ref 1.85–7.62)
NEUTS SEG NFR BLD AUTO: 60 % (ref 43–75)
NRBC BLD AUTO-RTO: 0 /100 WBCS
PLATELET # BLD AUTO: 226 THOUSANDS/UL (ref 149–390)
PMV BLD AUTO: 8.3 FL (ref 8.9–12.7)
POTASSIUM SERPL-SCNC: 3.9 MMOL/L (ref 3.5–5.3)
PROT SERPL-MCNC: 6.5 G/DL (ref 6.4–8.2)
RBC # BLD AUTO: 3.97 MILLION/UL (ref 3.81–5.12)
SODIUM SERPL-SCNC: 133 MMOL/L (ref 136–145)
TRIGL SERPL-MCNC: 47 MG/DL
TSH SERPL DL<=0.05 MIU/L-ACNC: 2.62 UIU/ML (ref 0.36–3.74)
VIT B12 SERPL-MCNC: 644 PG/ML (ref 100–900)
WBC # BLD AUTO: 6.51 THOUSAND/UL (ref 4.31–10.16)

## 2019-07-01 PROCEDURE — 85025 COMPLETE CBC W/AUTO DIFF WBC: CPT

## 2019-07-01 PROCEDURE — 82306 VITAMIN D 25 HYDROXY: CPT

## 2019-07-01 PROCEDURE — 82607 VITAMIN B-12: CPT

## 2019-07-01 PROCEDURE — 84443 ASSAY THYROID STIM HORMONE: CPT

## 2019-07-01 PROCEDURE — 36415 COLL VENOUS BLD VENIPUNCTURE: CPT

## 2019-07-01 PROCEDURE — 80053 COMPREHEN METABOLIC PANEL: CPT

## 2019-07-01 PROCEDURE — 80061 LIPID PANEL: CPT

## 2019-07-01 PROCEDURE — 83036 HEMOGLOBIN GLYCOSYLATED A1C: CPT

## 2019-07-06 ENCOUNTER — HOSPITAL ENCOUNTER (EMERGENCY)
Facility: HOSPITAL | Age: 54
Discharge: HOME | End: 2019-07-06
Attending: EMERGENCY MEDICINE | Admitting: EMERGENCY MEDICINE
Payer: COMMERCIAL

## 2019-07-06 VITALS
BODY MASS INDEX: 18.79 KG/M2 | WEIGHT: 102.1 LBS | RESPIRATION RATE: 17 BRPM | DIASTOLIC BLOOD PRESSURE: 56 MMHG | TEMPERATURE: 97.9 F | HEART RATE: 85 BPM | HEIGHT: 62 IN | OXYGEN SATURATION: 99 % | SYSTOLIC BLOOD PRESSURE: 117 MMHG

## 2019-07-06 DIAGNOSIS — M54.31 BILATERAL SCIATICA: Primary | ICD-10-CM

## 2019-07-06 DIAGNOSIS — M54.32 BILATERAL SCIATICA: Primary | ICD-10-CM

## 2019-07-06 PROCEDURE — 99283 EMERGENCY DEPT VISIT LOW MDM: CPT

## 2019-07-06 PROCEDURE — 99283 EMERGENCY DEPT VISIT LOW MDM: CPT | Performed by: PHYSICIAN ASSISTANT

## 2019-07-06 RX ORDER — LIDOCAINE 40 MG/G
CREAM TOPICAL AS NEEDED
Qty: 30 G | Refills: 0 | Status: SHIPPED | OUTPATIENT
Start: 2019-07-06

## 2019-07-06 NOTE — ED PROVIDER NOTES
History  Chief Complaint   Patient presents with    Back Pain     Pt reports back pain  Pt reports increased pain worse than usual       Patient presents to the emergency department today offering a chief complaint of bilateral low back pain  Has been ongoing for 24 hours  She denies recent trauma  Patient states this is an exacerbation of her chronic low back pain which she sees pain management and received injections for  She denies fevers chills sweats  Denies incontinence of urine or stool  No history of leg weakness  Pain is in the low back shooting down bilateral legs  She takes Mobic daily  She is prescribe Zanaflex however she did not take any  Prior to Admission Medications   Prescriptions Last Dose Informant Patient Reported? Taking?    Calcium Carbonate-Vitamin D (CALCIUM 500 + D PO)   Yes Yes   Sig: Take 1 tablet by mouth daily   DULoxetine (CYMBALTA) 30 mg delayed release capsule   No Yes   Sig: Take 1 capsule (30 mg total) by mouth daily   Docusate Calcium (STOOL SOFTENER PO)   Yes Yes   Sig: Take 1 capsule by mouth daily   ferrous sulfate 325 (65 Fe) mg tablet   Yes Yes   Sig: Take 325 mg by mouth daily with breakfast   gabapentin (NEURONTIN) 300 mg capsule   No Yes   Sig: Take 1 capsule (300 mg total) by mouth 2 (two) times a day   meloxicam (MOBIC) 15 mg tablet   No Yes   Sig: Take 1 tablet (15 mg total) by mouth daily   tiZANidine (ZANAFLEX) 2 mg tablet   Yes Yes   Sig: Take 2 mg by mouth as needed       Facility-Administered Medications: None       Past Medical History:   Diagnosis Date    Back problem     Shoulder pain        Past Surgical History:   Procedure Laterality Date    BREAST SURGERY      DILATION AND CURETTAGE OF UTERUS      LEG SURGERY Left     LEG SURGERY      SHOULDER ARTHROSCOPY         Family History   Problem Relation Age of Onset    Breast cancer Paternal Grandmother     Colon cancer Paternal Grandmother     Arthritis Family     Diabetes Family     Hypertension Family     Osteoporosis Family     Ovarian cancer Family     Uterine cancer Family      I have reviewed and agree with the history as documented  Social History     Tobacco Use    Smoking status: Former Smoker    Smokeless tobacco: Never Used   Substance Use Topics    Alcohol use: Yes     Comment: occassional    Drug use: No        Review of Systems   Constitutional: Negative  Negative for chills and fever  HENT: Negative  Negative for sore throat and trouble swallowing  Eyes: Negative  Respiratory: Negative  Negative for cough, shortness of breath and wheezing  Cardiovascular: Negative  Negative for chest pain and leg swelling  Gastrointestinal: Negative  Negative for abdominal pain, blood in stool and vomiting  Endocrine: Negative  Genitourinary: Negative  Musculoskeletal: Positive for back pain  Negative for neck stiffness  Skin: Negative  Allergic/Immunologic: Negative  Neurological: Negative  Negative for dizziness, seizures, speech difficulty, weakness, light-headedness, numbness and headaches  Hematological: Negative  Psychiatric/Behavioral: Negative  All other systems reviewed and are negative  Physical Exam  Physical Exam   Constitutional: She is oriented to person, place, and time  Vital signs are normal  She appears well-developed and well-nourished  She does not have a sickly appearance  She does not appear ill  No distress  HENT:   Right Ear: External ear normal  No swelling  Tympanic membrane is not bulging  Left Ear: External ear normal  No swelling  Tympanic membrane is not bulging  Nose: Nose normal    Mouth/Throat: Oropharynx is clear and moist  No oropharyngeal exudate  Eyes: Pupils are equal, round, and reactive to light  Conjunctivae, EOM and lids are normal    Neck: Normal range of motion  Neck supple  No JVD present  No tracheal deviation, no edema and normal range of motion present  No thyromegaly present  Cardiovascular: Normal rate, regular rhythm, normal heart sounds, intact distal pulses and normal pulses  Exam reveals no gallop and no friction rub  No murmur heard  Pulmonary/Chest: Effort normal and breath sounds normal  No stridor  No respiratory distress  She has no wheezes  She has no rales  She exhibits no tenderness  Abdominal: Soft  Bowel sounds are normal  She exhibits no distension and no mass  There is no tenderness  There is no rebound, no guarding and negative Pereira's sign  No hernia  Musculoskeletal: Normal range of motion  She exhibits tenderness  She exhibits no edema or deformity  Arms:  Lymphadenopathy:     She has no cervical adenopathy  Neurological: She is alert and oriented to person, place, and time  She has normal strength and normal reflexes  No cranial nerve deficit or sensory deficit  GCS eye subscore is 4  GCS verbal subscore is 5  GCS motor subscore is 6  Skin: Skin is warm and dry  Capillary refill takes less than 2 seconds  No rash noted  She is not diaphoretic  No erythema  No pallor  Psychiatric: She has a normal mood and affect  Her speech is normal and behavior is normal    Vitals reviewed        Vital Signs  ED Triage Vitals   Temperature Pulse Respirations Blood Pressure SpO2   07/06/19 0951 07/06/19 0951 07/06/19 0951 07/06/19 0951 07/06/19 0951   97 9 °F (36 6 °C) 85 17 117/56 99 %      Temp Source Heart Rate Source Patient Position - Orthostatic VS BP Location FiO2 (%)   07/06/19 0951 07/06/19 0951 07/06/19 0951 07/06/19 0951 --   Temporal Monitor Lying Left arm       Pain Score       07/06/19 0952       7           Vitals:    07/06/19 0951   BP: 117/56   Pulse: 85   Patient Position - Orthostatic VS: Lying         Visual Acuity      ED Medications  Medications - No data to display    Diagnostic Studies  Results Reviewed     None                 No orders to display              Procedures  Procedures       ED Course  ED Course as of Jul 06 1005   Sat Jul 06, 2019   7859 Blood Pressure: 117/56   0959 Temperature: 97 9 °F (36 6 °C)   0959 Pulse: 85   0959 Respirations: 17   0959 SpO2: 99 %                               MDM    Disposition  Final diagnoses:   Bilateral sciatica     Time reflects when diagnosis was documented in both MDM as applicable and the Disposition within this note     Time User Action Codes Description Comment    7/6/2019 10:04 AM Raquel Acevedo [M54 31,  M54 32] Bilateral sciatica       ED Disposition     ED Disposition Condition Date/Time Comment    Discharge Stable Sat Jul 6, 2019 10:04 AM Cindy Sandoval discharge to home/self care  Follow-up Information     Follow up With Specialties Details Why [address], [address] [facility], Nurse Practitioner Schedule an appointment as soon as possible for a visit   [address]  [phone]            Patient's Medications   Discharge Prescriptions    LIDOCAINE (LMX) 4 % CREAM    Apply topically as needed for mild pain       Start Date: 7/6/2019  End Date: --       Order Dose: --       Quantity: 30 g    Refills: 0     No discharge procedures on file      ED Provider  Electronically Signed by           Eliud Bauer PA-C  07/06/19 7863

## 2019-07-06 NOTE — DISCHARGE INSTRUCTIONS
Please utilize her Mobic as well as her Zanaflex  Daily stretching with heat therapy also recommended  Follow up with primary care

## 2019-11-22 ENCOUNTER — OFFICE VISIT (OUTPATIENT)
Dept: FAMILY MEDICINE CLINIC | Facility: HOME HEALTHCARE | Age: 54
End: 2019-11-22
Payer: COMMERCIAL

## 2019-11-22 VITALS
BODY MASS INDEX: 18.4 KG/M2 | WEIGHT: 100 LBS | HEIGHT: 62 IN | OXYGEN SATURATION: 97 % | DIASTOLIC BLOOD PRESSURE: 68 MMHG | HEART RATE: 81 BPM | SYSTOLIC BLOOD PRESSURE: 100 MMHG | TEMPERATURE: 98.3 F | RESPIRATION RATE: 18 BRPM

## 2019-11-22 DIAGNOSIS — K92.1 BLOOD IN STOOL: Primary | ICD-10-CM

## 2019-11-22 PROCEDURE — 99213 OFFICE O/P EST LOW 20 MIN: CPT | Performed by: FAMILY MEDICINE

## 2019-11-22 NOTE — PROGRESS NOTES
[address]       NAME: Jer Miarmontes is a 48 y o  female  : 1965    MRN: [id number]  DATE: 2019  TIME: 2:25 PM    Assessment and Plan   Diagnoses and all orders for this visit:    Blood in stool  Comments:  Likely related to longstanding constipation and hemorrhoid  Will check lab work, FIT, and referral to GI  Follow up as needed based on results  Orders:  -     CBC and differential; Future  -     Comprehensive metabolic panel; Future  -     Lipid Panel with Direct LDL reflex; Future  -     TSH + Free T4; Future  -     Occult Blood, Fecal Immunochemical; Future  -     Ambulatory referral to Gastroenterology; Future  -     Protime-INR; Future        No problem-specific Assessment & Plan notes found for this encounter  Patient Instructions           Chief Complaint     Chief Complaint   Patient presents with    Follow-up     Patient states she believes she has blood in stool states its been going on for about a week on and off its not black stool but its also not bright red but she states its different and believes its blood  She says she had a colosnoscopy about 10 years ago  History of Present Illness       Claremaggi Rodas is here with concern of having blood in her stool times 2-3 weeks  Denies any changes in weight  Denies any changes in appetite  No nausea/vomiting/diarrhea  Does have a longstanding history of constipation for which she use to see GI  Admits that she has not seen them in some time  Had been taking flaxseed, recently switched over to Metamucil for constipation controlled  Did discuss dietary options such as warm prune juice that the patient may try also  She denies feeling weak or dizzy  No coffee-ground stools  Reports that the amount of blood she sees in the toilet is minimal   Also reports of lump to outside of anus which appeared approximately 1-2 weeks ago  No other complaints at this time        Review of Systems   Review of Systems Constitutional: Negative  HENT: Negative  Respiratory: Negative  Cardiovascular: Negative  Gastrointestinal: Positive for blood in stool and constipation  Negative for diarrhea, nausea, rectal pain and vomiting  Genitourinary: Negative  Musculoskeletal: Negative  Skin: Negative  Psychiatric/Behavioral: Negative            Current Medications       Current Outpatient Medications:     Calcium Carbonate-Vitamin D (CALCIUM 500 + D PO), Take 1 tablet by mouth daily, Disp: , Rfl:     Docusate Calcium (STOOL SOFTENER PO), Take 1 capsule by mouth daily, Disp: , Rfl:     DULoxetine (CYMBALTA) 30 mg delayed release capsule, Take 1 capsule (30 mg total) by mouth daily, Disp: 90 capsule, Rfl: 2    ferrous sulfate 325 (65 Fe) mg tablet, Take 325 mg by mouth daily with breakfast, Disp: , Rfl:     gabapentin (NEURONTIN) 300 mg capsule, Take 1 capsule (300 mg total) by mouth 2 (two) times a day, Disp: 180 capsule, Rfl: 2    lidocaine (LMX) 4 % cream, Apply topically as needed for mild pain, Disp: 30 g, Rfl: 0    meloxicam (MOBIC) 15 mg tablet, Take 1 tablet (15 mg total) by mouth daily, Disp: 90 tablet, Rfl: 2    tiZANidine (ZANAFLEX) 2 mg tablet, Take 2 mg by mouth as needed , Disp: , Rfl:     Current Allergies     Allergies as of 11/22/2019 - Reviewed 11/22/2019   Allergen Reaction Noted    Amitriptyline  07/01/2013            The following portions of the patient's history were reviewed and updated as appropriate: allergies, current medications, past family history, past medical history, past social history, past surgical history and problem list      Past Medical History:   Diagnosis Date    Back problem     Shoulder pain        Past Surgical History:   Procedure Laterality Date    BREAST SURGERY      DILATION AND CURETTAGE OF UTERUS      LEG SURGERY Left     LEG SURGERY      SHOULDER ARTHROSCOPY         Family History   Problem Relation Age of Onset    Breast cancer Paternal Grandmother  Colon cancer Paternal Grandmother     Arthritis Family     Diabetes Family     Hypertension Family     Osteoporosis Family     Ovarian cancer Family     Uterine cancer Family          Medications have been verified  Objective   /68   Pulse 81   Temp 98 3 °F (36 8 °C)   Resp 18   Ht 5' 2" (1 575 m)   Wt 45 4 kg (100 lb)   SpO2 97%   BMI 18 29 kg/m²        Physical Exam     Physical Exam   Constitutional: She appears well-developed and well-nourished  HENT:   Mouth/Throat: Oropharynx is clear and moist    Eyes: Pupils are equal, round, and reactive to light  Conjunctivae and EOM are normal    Neck: Normal range of motion  Neck supple  Cardiovascular: Normal rate, regular rhythm, normal heart sounds and intact distal pulses  Pulmonary/Chest: Effort normal and breath sounds normal    Abdominal: Soft  Bowel sounds are normal  She exhibits no distension  There is no tenderness  There is no guarding  Hemorrhoids noted   Skin: Skin is warm and dry  Capillary refill takes less than 2 seconds  Psychiatric: She has a normal mood and affect  Her behavior is normal  Judgment and thought content normal    Nursing note and vitals reviewed

## 2019-11-22 NOTE — PATIENT INSTRUCTIONS
Thoroughly reviewed emergent symptoms requiring immediate follow-up  Can try preparation H or warm Sitz baths for hemorrhoids

## 2019-11-26 ENCOUNTER — APPOINTMENT (OUTPATIENT)
Dept: LAB | Facility: HOSPITAL | Age: 54
End: 2019-11-26
Payer: COMMERCIAL

## 2019-11-26 DIAGNOSIS — K92.1 BLOOD IN STOOL: ICD-10-CM

## 2019-11-26 PROCEDURE — G0328 FECAL BLOOD SCRN IMMUNOASSAY: HCPCS

## 2019-11-27 LAB — HEMOCCULT STL QL IA: NEGATIVE

## 2019-12-04 ENCOUNTER — APPOINTMENT (OUTPATIENT)
Dept: LAB | Facility: HOSPITAL | Age: 54
End: 2019-12-04
Payer: COMMERCIAL

## 2019-12-04 DIAGNOSIS — K92.1 BLOOD IN STOOL: ICD-10-CM

## 2019-12-04 LAB
ALBUMIN SERPL BCP-MCNC: 3.7 G/DL (ref 3.5–5)
ALP SERPL-CCNC: 38 U/L (ref 46–116)
ALT SERPL W P-5'-P-CCNC: 26 U/L (ref 12–78)
ANION GAP SERPL CALCULATED.3IONS-SCNC: 8 MMOL/L (ref 4–13)
AST SERPL W P-5'-P-CCNC: 17 U/L (ref 5–45)
BASOPHILS # BLD AUTO: 0.01 THOUSANDS/ΜL (ref 0–0.1)
BASOPHILS NFR BLD AUTO: 0 % (ref 0–1)
BILIRUB SERPL-MCNC: 0.4 MG/DL (ref 0.2–1)
BUN SERPL-MCNC: 11 MG/DL (ref 5–25)
CALCIUM SERPL-MCNC: 8.8 MG/DL (ref 8.3–10.1)
CHLORIDE SERPL-SCNC: 100 MMOL/L (ref 100–108)
CHOLEST SERPL-MCNC: 225 MG/DL (ref 50–200)
CO2 SERPL-SCNC: 27 MMOL/L (ref 21–32)
CREAT SERPL-MCNC: 0.5 MG/DL (ref 0.6–1.3)
EOSINOPHIL # BLD AUTO: 0.11 THOUSAND/ΜL (ref 0–0.61)
EOSINOPHIL NFR BLD AUTO: 2 % (ref 0–6)
ERYTHROCYTE [DISTWIDTH] IN BLOOD BY AUTOMATED COUNT: 11.9 % (ref 11.6–15.1)
GFR SERPL CREATININE-BSD FRML MDRD: 112 ML/MIN/1.73SQ M
GLUCOSE P FAST SERPL-MCNC: 86 MG/DL (ref 65–99)
HCT VFR BLD AUTO: 36.9 % (ref 34.8–46.1)
HDLC SERPL-MCNC: 76 MG/DL
HGB BLD-MCNC: 12.5 G/DL (ref 11.5–15.4)
IMM GRANULOCYTES # BLD AUTO: 0.02 THOUSAND/UL (ref 0–0.2)
IMM GRANULOCYTES NFR BLD AUTO: 0 % (ref 0–2)
INR PPP: 0.93 (ref 0.84–1.19)
LDLC SERPL CALC-MCNC: 136 MG/DL (ref 0–100)
LYMPHOCYTES # BLD AUTO: 1.86 THOUSANDS/ΜL (ref 0.6–4.47)
LYMPHOCYTES NFR BLD AUTO: 33 % (ref 14–44)
MCH RBC QN AUTO: 31.4 PG (ref 26.8–34.3)
MCHC RBC AUTO-ENTMCNC: 33.9 G/DL (ref 31.4–37.4)
MCV RBC AUTO: 93 FL (ref 82–98)
MONOCYTES # BLD AUTO: 0.5 THOUSAND/ΜL (ref 0.17–1.22)
MONOCYTES NFR BLD AUTO: 9 % (ref 4–12)
NEUTROPHILS # BLD AUTO: 3.09 THOUSANDS/ΜL (ref 1.85–7.62)
NEUTS SEG NFR BLD AUTO: 56 % (ref 43–75)
NRBC BLD AUTO-RTO: 0 /100 WBCS
PLATELET # BLD AUTO: 250 THOUSANDS/UL (ref 149–390)
PMV BLD AUTO: 8.8 FL (ref 8.9–12.7)
POTASSIUM SERPL-SCNC: 3.6 MMOL/L (ref 3.5–5.3)
PROT SERPL-MCNC: 6.6 G/DL (ref 6.4–8.2)
PROTHROMBIN TIME: 12.5 SECONDS (ref 11.6–14.5)
RBC # BLD AUTO: 3.98 MILLION/UL (ref 3.81–5.12)
SODIUM SERPL-SCNC: 135 MMOL/L (ref 136–145)
T4 FREE SERPL-MCNC: 0.75 NG/DL (ref 0.76–1.46)
TRIGL SERPL-MCNC: 66 MG/DL
TSH SERPL DL<=0.05 MIU/L-ACNC: 3.03 UIU/ML (ref 0.36–3.74)
WBC # BLD AUTO: 5.59 THOUSAND/UL (ref 4.31–10.16)

## 2019-12-04 PROCEDURE — 36415 COLL VENOUS BLD VENIPUNCTURE: CPT

## 2019-12-04 PROCEDURE — 85025 COMPLETE CBC W/AUTO DIFF WBC: CPT

## 2019-12-04 PROCEDURE — 85610 PROTHROMBIN TIME: CPT

## 2019-12-04 PROCEDURE — 80061 LIPID PANEL: CPT

## 2019-12-04 PROCEDURE — 84443 ASSAY THYROID STIM HORMONE: CPT

## 2019-12-04 PROCEDURE — 80053 COMPREHEN METABOLIC PANEL: CPT

## 2019-12-04 PROCEDURE — 84439 ASSAY OF FREE THYROXINE: CPT

## 2019-12-09 ENCOUNTER — HOSPITAL ENCOUNTER (OUTPATIENT)
Dept: RADIOLOGY | Facility: HOSPITAL | Age: 54
Discharge: HOME | End: 2019-12-09
Payer: COMMERCIAL

## 2019-12-09 ENCOUNTER — TRANSCRIBE ORDERS (OUTPATIENT)
Dept: ADMINISTRATIVE | Facility: HOSPITAL | Age: 54
End: 2019-12-09

## 2019-12-09 DIAGNOSIS — M54.2 CERVICALGIA: ICD-10-CM

## 2019-12-09 DIAGNOSIS — M46.1 INFLAMMATION OF SACROILIAC JOINT (HCC): ICD-10-CM

## 2019-12-09 DIAGNOSIS — M54.2 CERVICALGIA: Primary | ICD-10-CM

## 2019-12-09 PROCEDURE — 72114 X-RAY EXAM L-S SPINE BENDING: CPT

## 2019-12-09 PROCEDURE — 72040 X-RAY EXAM NECK SPINE 2-3 VW: CPT

## 2019-12-10 ENCOUNTER — TRANSCRIBE ORDERS (OUTPATIENT)
Dept: ADMINISTRATIVE | Facility: HOSPITAL | Age: 54
End: 2019-12-10

## 2019-12-11 ENCOUNTER — TRANSCRIBE ORDERS (OUTPATIENT)
Dept: ADMINISTRATIVE | Facility: HOSPITAL | Age: 54
End: 2019-12-11

## 2019-12-11 DIAGNOSIS — M54.9 BACK PAIN, UNSPECIFIED BACK LOCATION, UNSPECIFIED BACK PAIN LATERALITY, UNSPECIFIED CHRONICITY: ICD-10-CM

## 2019-12-11 DIAGNOSIS — R93.7 ABNORMAL X-RAY OF LUMBAR SPINE: Primary | ICD-10-CM

## 2019-12-11 DIAGNOSIS — M89.8X9 BONE DESTRUCTION: ICD-10-CM

## 2019-12-30 ENCOUNTER — HOSPITAL ENCOUNTER (OUTPATIENT)
Dept: MRI IMAGING | Facility: HOSPITAL | Age: 54
Discharge: HOME | End: 2019-12-30
Payer: COMMERCIAL

## 2019-12-30 DIAGNOSIS — M54.9 BACK PAIN, UNSPECIFIED BACK LOCATION, UNSPECIFIED BACK PAIN LATERALITY, UNSPECIFIED CHRONICITY: ICD-10-CM

## 2019-12-30 DIAGNOSIS — R93.7 ABNORMAL X-RAY OF LUMBAR SPINE: ICD-10-CM

## 2019-12-30 DIAGNOSIS — M89.8X9 BONE DESTRUCTION: ICD-10-CM

## 2019-12-30 PROCEDURE — 72148 MRI LUMBAR SPINE W/O DYE: CPT

## 2020-03-06 RX ORDER — FLUOXETINE HYDROCHLORIDE 40 MG/1
40 CAPSULE ORAL DAILY
COMMUNITY
Start: 2011-07-26 | End: 2020-04-21 | Stop reason: ALTCHOICE

## 2020-03-10 ENCOUNTER — CONSULT (OUTPATIENT)
Dept: GASTROENTEROLOGY | Facility: CLINIC | Age: 55
End: 2020-03-10
Payer: COMMERCIAL

## 2020-03-10 ENCOUNTER — APPOINTMENT (OUTPATIENT)
Dept: LAB | Facility: HOSPITAL | Age: 55
End: 2020-03-10
Attending: INTERNAL MEDICINE
Payer: COMMERCIAL

## 2020-03-10 VITALS
BODY MASS INDEX: 19.69 KG/M2 | HEART RATE: 70 BPM | WEIGHT: 107 LBS | DIASTOLIC BLOOD PRESSURE: 61 MMHG | SYSTOLIC BLOOD PRESSURE: 106 MMHG | HEIGHT: 62 IN | TEMPERATURE: 97 F

## 2020-03-10 DIAGNOSIS — K92.1 BLOOD IN STOOL: ICD-10-CM

## 2020-03-10 DIAGNOSIS — K59.03 DRUG-INDUCED CONSTIPATION: Primary | ICD-10-CM

## 2020-03-10 LAB
FERRITIN SERPL-MCNC: 365 NG/ML (ref 8–388)
IRON SATN MFR SERPL: 23 %
IRON SERPL-MCNC: 71 UG/DL (ref 50–170)
TIBC SERPL-MCNC: 303 UG/DL (ref 250–450)

## 2020-03-10 PROCEDURE — 99203 OFFICE O/P NEW LOW 30 MIN: CPT | Performed by: INTERNAL MEDICINE

## 2020-03-10 PROCEDURE — 83540 ASSAY OF IRON: CPT

## 2020-03-10 PROCEDURE — 36415 COLL VENOUS BLD VENIPUNCTURE: CPT

## 2020-03-10 PROCEDURE — 83550 IRON BINDING TEST: CPT

## 2020-03-10 PROCEDURE — 82728 ASSAY OF FERRITIN: CPT

## 2020-03-10 RX ORDER — POLYETHYLENE GLYCOL 3350 17 G/17G
17 POWDER, FOR SOLUTION ORAL 2 TIMES DAILY
Qty: 578 G | Refills: 3 | Status: SHIPPED | OUTPATIENT
Start: 2020-03-10

## 2020-03-10 NOTE — PROGRESS NOTES
[facility] - Outpatient Consultation  Shawna Coleman 48 y o  female MRN: [id number]  Encounter: [id number]          ASSESSMENT AND PLAN:      1  Rectal bleeding  - most likely due to internal hemorrhoids due to straining from constipation  - no bleeding in the last 1 mos  - start treatment for constipation  - schedule colonoscopy with suprep for rectal bleeding and screening    2  Constipation  - start miralax 1 capful twice a day  - stop docusate and metamucil  - stop iron tablets  - check iron panel    3  Co-morbidities: allergies, sinus bradycardia, cervical radiculopathy, arthritis, herniated lumbar disc, fibromyalgia, narcolepsy, vit D def    Follow up in 4-5 months    ______________________________________________________________________    HPI:  Ms Chava Jimenez is a 49 yo W who presents for rectal bleeding and constipation  Co-morbidities: allergies, sinus bradycardia, cervical radiculopathy, arthritis, herniated lumbar disc, fibromyalgia, narcolepsy, vit D def    Rectal bleeding  - started about 4-5 mos ago  - had 2-3 episodes  - bright red blood with stool (formed)  - noted with wiping  - noted a few drops of blood  - no blood in stool for the past 1 month  - no rectal pain  - colonoscopy about 10-15 years ago which was negative (per pt)   - takes meloxciam 15 mg qd (joint pain)    Constipation  - 1 BM per day  - BSS 2-3  - drinks a lot of water daily (24 cups per day)   - takes metamucil daily, docusate 1 cap daily and use to take prune juice  - takes iron qd (has been on it for a long time)  - has diarrhea (mushy stools) after prune juice   - no vaginal deliveries    Notes regurgitation of water sometimes  Dark stools due iron  ROS: No abd pain, nausea, vomiting, heartburn, acid reflux, odynophagia, dysphagia, hematemesis, melena, early satiety, appetite changes, wt loss  REVIEW OF SYSTEMS:    CONSTITUTIONAL: Denies any fever, chills, rigors, and weight loss    HEENT: No earache or tinnitus  Denies hearing loss or visual disturbances  CARDIOVASCULAR: No chest pain or palpitations  RESPIRATORY: Denies any cough, hemoptysis, shortness of breath or dyspnea on exertion  GASTROINTESTINAL: As noted in the History of Present Illness  GENITOURINARY: No problems with urination  Denies any hematuria or dysuria  NEUROLOGIC: No dizziness or vertigo, denies headaches  MUSCULOSKELETAL: Denies any muscle or joint pain  SKIN: Denies skin rashes or itching  ENDOCRINE: Denies excessive thirst  Denies intolerance to heat or cold  PSYCHOSOCIAL: Denies depression or anxiety  Denies any recent memory loss         Historical Information   Past Medical History:   Diagnosis Date    Back problem     Shoulder pain      Past Surgical History:   Procedure Laterality Date    BREAST SURGERY      DILATION AND CURETTAGE OF UTERUS      LEG SURGERY Left     LEG SURGERY      SHOULDER ARTHROSCOPY       Social History   Social History     Substance and Sexual Activity   Alcohol Use Yes    Comment: occassional     Social History     Substance and Sexual Activity   Drug Use No     Social History     Tobacco Use   Smoking Status Former Smoker   Smokeless Tobacco Never Used     Family History   Problem Relation Age of Onset    Breast cancer Paternal Grandmother     Colon cancer Paternal Grandmother     Arthritis Family     Diabetes Family     Hypertension Family     Osteoporosis Family     Ovarian cancer Family     Uterine cancer Family    no colon cancer    Meds/Allergies       Current Outpatient Medications:     Calcium Carbonate-Vitamin D (CALCIUM 500 + D PO)    Docusate Calcium (STOOL SOFTENER PO)    DULoxetine (CYMBALTA) 30 mg delayed release capsule    ferrous sulfate 325 (65 Fe) mg tablet    FLUoxetine (PROzac) 40 MG capsule    gabapentin (NEURONTIN) 300 mg capsule    HYDROcodone-Acetaminophen (VICODIN PO)    lidocaine (LMX) 4 % cream    meloxicam (MOBIC) 15 mg tablet   tiZANidine (ZANAFLEX) 2 mg tablet    Allergies   Allergen Reactions    Amitriptyline      Other reaction(s): Constipation, Dry mouth           Objective     Blood pressure 106/61, pulse 70, temperature (!) 97 °F (36 1 °C), temperature source Tympanic, height 5' 2" (1 575 m), weight 48 5 kg (107 lb)  Body mass index is 19 57 kg/m²  PHYSICAL EXAM:      General Appearance:   Alert, cooperative, no distress   HEENT:   Normocephalic, atraumatic, anicteric  Neck:  Supple, symmetrical, trachea midline   Lungs:   Clear to auscultation bilaterally; no rales, rhonchi or wheezing; respirations unlabored    Heart[de-identified]   Regular rate and rhythm; no murmur, rub, or gallop  Abdomen:   Soft, non-tender, non-distended; normal bowel sounds; no masses, no organomegaly    Rectal:   Deferred   Neuro:   Alert, oriented, no gross deficits, normal strength and tone   Extremities:  No cyanosis, clubbing or edema    Psych:  Normal mood and affect    Skin:  No jaundice, rashes, or lesions    Lymph nodes:  No palpable cervical lymphadenopathy        Lab Results:   No visits with results within 1 Day(s) from this visit     Latest known visit with results is:   Appointment on 12/04/2019   Component Date Value    WBC 12/04/2019 5 59     RBC 12/04/2019 3 98     Hemoglobin 12/04/2019 12 5     Hematocrit 12/04/2019 36 9     MCV 12/04/2019 93     MCH 12/04/2019 31 4     MCHC 12/04/2019 33 9     RDW 12/04/2019 11 9     MPV 12/04/2019 8 8*    Platelets 53/84/5470 250     nRBC 12/04/2019 0     Neutrophils Relative 12/04/2019 56     Immat GRANS % 12/04/2019 0     Lymphocytes Relative 12/04/2019 33     Monocytes Relative 12/04/2019 9     Eosinophils Relative 12/04/2019 2     Basophils Relative 12/04/2019 0     Neutrophils Absolute 12/04/2019 3 09     Immature Grans Absolute 12/04/2019 0 02     Lymphocytes Absolute 12/04/2019 1 86     Monocytes Absolute 12/04/2019 0 50     Eosinophils Absolute 12/04/2019 0 11     Basophils Absolute 12/04/2019 0 01     Sodium 12/04/2019 135*    Potassium 12/04/2019 3 6     Chloride 12/04/2019 100     CO2 12/04/2019 27     ANION GAP 12/04/2019 8     BUN 12/04/2019 11     Creatinine 12/04/2019 0 50*    Glucose, Fasting 12/04/2019 86     Calcium 12/04/2019 8 8     AST 12/04/2019 17     ALT 12/04/2019 26     Alkaline Phosphatase 12/04/2019 38*    Total Protein 12/04/2019 6 6     Albumin 12/04/2019 3 7     Total Bilirubin 12/04/2019 0 40     eGFR 12/04/2019 112     Cholesterol 12/04/2019 225*    Triglycerides 12/04/2019 66     HDL, Direct 12/04/2019 76     LDL Calculated 12/04/2019 136*    Protime 12/04/2019 12 5     INR 12/04/2019 0 93     TSH 3RD GENERATON 12/04/2019 3 033     Free T4 12/04/2019 0 75*     Radiology Results:   No results found  Endoscopies:   EGD many years ago (>30 years ago)  Colonoscopy (about 10 years ago) and negative per pt

## 2020-03-10 NOTE — PATIENT INSTRUCTIONS
Rectal bleeding  - most likely due to internal hemorrhoids due to straining from constipation  - we will start you on treatment for constipation which will help this  - schedule colonoscopy with suprep for rectal bleeding and screening    Constipation  - start miralax 1 capful twice a day  - stop docusate and metamucil  - stop iron tablets  - cut back on water  - have blood tests to check iron levels    Follow up in 4-5 months      Scheduled patient for a colonoscopy @ [facility] with Dr Zuhair Ryan  Gave rios-prep instructions  Patient expressed understanding  Gave labs   Recall in for follow up appointment in 08/01/2020

## 2020-03-11 ENCOUNTER — PREP FOR PROCEDURE (OUTPATIENT)
Dept: SURGERY | Facility: CLINIC | Age: 55
End: 2020-03-11

## 2020-03-11 ENCOUNTER — TELEPHONE (OUTPATIENT)
Dept: GASTROENTEROLOGY | Facility: AMBULARY SURGERY CENTER | Age: 55
End: 2020-03-11

## 2020-03-11 DIAGNOSIS — F32.A DEPRESSION, UNSPECIFIED DEPRESSION TYPE: ICD-10-CM

## 2020-03-11 DIAGNOSIS — K21.00 GASTROESOPHAGEAL REFLUX DISEASE WITH ESOPHAGITIS: Primary | ICD-10-CM

## 2020-03-11 RX ORDER — DULOXETIN HYDROCHLORIDE 30 MG/1
30 CAPSULE, DELAYED RELEASE ORAL DAILY
Qty: 90 CAPSULE | Refills: 2 | Status: SHIPPED | OUTPATIENT
Start: 2020-03-11 | End: 2020-03-16 | Stop reason: SDUPTHER

## 2020-03-11 NOTE — TELEPHONE ENCOUNTER
Left message on voicemail that her iron levels are normal  Dr is recommending  To stop taking iron supplement

## 2020-03-11 NOTE — TELEPHONE ENCOUNTER
----- Message from Dani Perkins MD sent at 3/10/2020 10:53 PM EDT -----  Please let pt know that her iron levels are normal  I recommend she stop taking iron supplement because of this       Thank you,  Jose Maria Schroeder

## 2020-03-16 DIAGNOSIS — F32.A DEPRESSION, UNSPECIFIED DEPRESSION TYPE: ICD-10-CM

## 2020-03-16 RX ORDER — DULOXETIN HYDROCHLORIDE 30 MG/1
30 CAPSULE, DELAYED RELEASE ORAL DAILY
Qty: 90 CAPSULE | Refills: 2 | Status: SHIPPED | OUTPATIENT
Start: 2020-03-16 | End: 2021-06-02

## 2020-04-21 ENCOUNTER — TELEMEDICINE (OUTPATIENT)
Dept: FAMILY MEDICINE CLINIC | Facility: HOME HEALTHCARE | Age: 55
End: 2020-04-21
Payer: COMMERCIAL

## 2020-04-21 DIAGNOSIS — L60.9 NAIL ABNORMALITIES: Primary | ICD-10-CM

## 2020-04-21 PROCEDURE — G0071 COMM SVCS BY RHC/FQHC 5 MIN: HCPCS | Performed by: FAMILY MEDICINE

## 2020-04-21 RX ORDER — DIPHENOXYLATE HYDROCHLORIDE AND ATROPINE SULFATE 2.5; .025 MG/1; MG/1
1 TABLET ORAL DAILY
COMMUNITY

## 2020-04-27 DIAGNOSIS — M19.90 ARTHRITIS: ICD-10-CM

## 2020-04-27 RX ORDER — MELOXICAM 15 MG/1
15 TABLET ORAL DAILY
Qty: 90 TABLET | Refills: 2 | Status: SHIPPED | OUTPATIENT
Start: 2020-04-27 | End: 2021-01-25

## 2020-04-28 ENCOUNTER — TELEPHONE (OUTPATIENT)
Dept: GASTROENTEROLOGY | Facility: CLINIC | Age: 55
End: 2020-04-28

## 2020-05-01 ENCOUNTER — APPOINTMENT (OUTPATIENT)
Dept: LAB | Facility: HOSPITAL | Age: 55
End: 2020-05-01
Payer: COMMERCIAL

## 2020-05-01 ENCOUNTER — TELEPHONE (OUTPATIENT)
Dept: GASTROENTEROLOGY | Facility: CLINIC | Age: 55
End: 2020-05-01

## 2020-05-01 DIAGNOSIS — L60.9 NAIL ABNORMALITIES: ICD-10-CM

## 2020-05-01 LAB
CA-I BLD-SCNC: 1.2 MMOL/L (ref 1.12–1.32)
FERRITIN SERPL-MCNC: 305 NG/ML (ref 8–388)
IRON SATN MFR SERPL: 40 %
IRON SERPL-MCNC: 111 UG/DL (ref 50–170)
TIBC SERPL-MCNC: 279 UG/DL (ref 250–450)
TSH SERPL DL<=0.05 MIU/L-ACNC: 3.23 UIU/ML (ref 0.36–3.74)

## 2020-05-01 PROCEDURE — 82728 ASSAY OF FERRITIN: CPT

## 2020-05-01 PROCEDURE — 36415 COLL VENOUS BLD VENIPUNCTURE: CPT

## 2020-05-01 PROCEDURE — 84443 ASSAY THYROID STIM HORMONE: CPT

## 2020-05-01 PROCEDURE — 83550 IRON BINDING TEST: CPT

## 2020-05-01 PROCEDURE — 82330 ASSAY OF CALCIUM: CPT

## 2020-05-01 PROCEDURE — 83540 ASSAY OF IRON: CPT

## 2020-05-03 ENCOUNTER — APPOINTMENT (OUTPATIENT)
Dept: RADIOLOGY | Facility: CLINIC | Age: 55
End: 2020-05-03
Payer: COMMERCIAL

## 2020-05-03 ENCOUNTER — OFFICE VISIT (OUTPATIENT)
Dept: URGENT CARE | Facility: CLINIC | Age: 55
End: 2020-05-03
Payer: COMMERCIAL

## 2020-05-03 VITALS
OXYGEN SATURATION: 98 % | HEART RATE: 66 BPM | DIASTOLIC BLOOD PRESSURE: 56 MMHG | TEMPERATURE: 98.5 F | RESPIRATION RATE: 18 BRPM | SYSTOLIC BLOOD PRESSURE: 115 MMHG

## 2020-05-03 DIAGNOSIS — M79.672 LEFT FOOT PAIN: Primary | ICD-10-CM

## 2020-05-03 DIAGNOSIS — M79.672 LEFT FOOT PAIN: ICD-10-CM

## 2020-05-03 PROCEDURE — S9083 URGENT CARE CENTER GLOBAL: HCPCS | Performed by: PHYSICIAN ASSISTANT

## 2020-05-03 PROCEDURE — 99213 OFFICE O/P EST LOW 20 MIN: CPT | Performed by: PHYSICIAN ASSISTANT

## 2020-05-03 PROCEDURE — 73630 X-RAY EXAM OF FOOT: CPT

## 2020-05-03 RX ORDER — PREDNISONE 10 MG/1
TABLET ORAL
Qty: 26 TABLET | Refills: 0 | Status: SHIPPED | OUTPATIENT
Start: 2020-05-03 | End: 2020-06-09 | Stop reason: ALTCHOICE

## 2020-05-06 ENCOUNTER — ANESTHESIA (OUTPATIENT)
Dept: PERIOP | Facility: HOSPITAL | Age: 55
End: 2020-05-06

## 2020-05-06 ENCOUNTER — ANESTHESIA EVENT (OUTPATIENT)
Dept: PERIOP | Facility: HOSPITAL | Age: 55
End: 2020-05-06

## 2020-05-06 ENCOUNTER — HOSPITAL ENCOUNTER (OUTPATIENT)
Dept: PERIOP | Facility: HOSPITAL | Age: 55
Setting detail: OUTPATIENT SURGERY
Discharge: HOME | End: 2020-05-06
Attending: INTERNAL MEDICINE | Admitting: INTERNAL MEDICINE
Payer: COMMERCIAL

## 2020-05-06 VITALS
SYSTOLIC BLOOD PRESSURE: 123 MMHG | DIASTOLIC BLOOD PRESSURE: 68 MMHG | TEMPERATURE: 97.7 F | RESPIRATION RATE: 16 BRPM | OXYGEN SATURATION: 100 % | HEART RATE: 52 BPM

## 2020-05-06 DIAGNOSIS — K21.00 GASTROESOPHAGEAL REFLUX DISEASE WITH ESOPHAGITIS: ICD-10-CM

## 2020-05-06 PROCEDURE — 45380 COLONOSCOPY AND BIOPSY: CPT | Performed by: INTERNAL MEDICINE

## 2020-05-06 PROCEDURE — 88305 TISSUE EXAM BY PATHOLOGIST: CPT | Performed by: PATHOLOGY

## 2020-05-06 RX ORDER — PROPOFOL 10 MG/ML
INJECTION, EMULSION INTRAVENOUS AS NEEDED
Status: DISCONTINUED | OUTPATIENT
Start: 2020-05-06 | End: 2020-05-06 | Stop reason: SURG

## 2020-05-06 RX ORDER — SODIUM CHLORIDE, SODIUM LACTATE, POTASSIUM CHLORIDE, CALCIUM CHLORIDE 600; 310; 30; 20 MG/100ML; MG/100ML; MG/100ML; MG/100ML
125 INJECTION, SOLUTION INTRAVENOUS CONTINUOUS
Status: DISCONTINUED | OUTPATIENT
Start: 2020-05-06 | End: 2020-05-10 | Stop reason: HOSPADM

## 2020-05-06 RX ORDER — PROPOFOL 10 MG/ML
INJECTION, EMULSION INTRAVENOUS CONTINUOUS PRN
Status: DISCONTINUED | OUTPATIENT
Start: 2020-05-06 | End: 2020-05-06 | Stop reason: SURG

## 2020-05-06 RX ADMIN — SODIUM CHLORIDE, SODIUM LACTATE, POTASSIUM CHLORIDE, AND CALCIUM CHLORIDE: .6; .31; .03; .02 INJECTION, SOLUTION INTRAVENOUS at 09:25

## 2020-05-06 RX ADMIN — PROPOFOL 150 MCG/KG/MIN: 10 INJECTION, EMULSION INTRAVENOUS at 09:29

## 2020-05-06 RX ADMIN — PROPOFOL 70 MG: 10 INJECTION, EMULSION INTRAVENOUS at 09:29

## 2020-05-15 ENCOUNTER — TELEPHONE (OUTPATIENT)
Dept: GASTROENTEROLOGY | Facility: AMBULARY SURGERY CENTER | Age: 55
End: 2020-05-15

## 2020-06-01 ENCOUNTER — TRANSCRIBE ORDERS (OUTPATIENT)
Dept: ADMINISTRATIVE | Facility: HOSPITAL | Age: 55
End: 2020-06-01

## 2020-06-01 ENCOUNTER — HOSPITAL ENCOUNTER (OUTPATIENT)
Dept: RADIOLOGY | Facility: HOSPITAL | Age: 55
Discharge: HOME | End: 2020-06-01
Attending: PODIATRIST
Payer: COMMERCIAL

## 2020-06-01 DIAGNOSIS — S92.346B: Primary | ICD-10-CM

## 2020-06-01 DIAGNOSIS — S92.346B: ICD-10-CM

## 2020-06-01 PROCEDURE — 73630 X-RAY EXAM OF FOOT: CPT

## 2020-06-09 ENCOUNTER — TELEMEDICINE (OUTPATIENT)
Dept: FAMILY MEDICINE CLINIC | Facility: HOME HEALTHCARE | Age: 55
End: 2020-06-09
Payer: COMMERCIAL

## 2020-06-09 DIAGNOSIS — M79.7 FIBROMYALGIA: ICD-10-CM

## 2020-06-09 DIAGNOSIS — Z12.4 CERVICAL CANCER SCREENING: ICD-10-CM

## 2020-06-09 DIAGNOSIS — Z12.39 BREAST CANCER SCREENING: Primary | ICD-10-CM

## 2020-06-09 PROCEDURE — G2025 DIS SITE TELE SVCS RHC/FQHC: HCPCS | Performed by: FAMILY MEDICINE

## 2020-06-09 PROCEDURE — 99213 OFFICE O/P EST LOW 20 MIN: CPT | Performed by: FAMILY MEDICINE

## 2020-06-09 RX ORDER — KETOCONAZOLE 20 MG/G
CREAM TOPICAL
COMMUNITY
Start: 2020-06-01 | End: 2022-05-10

## 2020-07-24 RX ORDER — GLUCOSAMINE/CHONDROITIN/C/MANG 500-400 MG
CAPSULE ORAL
COMMUNITY
End: 2020-09-14 | Stop reason: SDUPTHER

## 2020-07-29 ENCOUNTER — APPOINTMENT (OUTPATIENT)
Dept: LAB | Facility: HOSPITAL | Age: 55
End: 2020-07-29
Attending: INTERNAL MEDICINE
Payer: COMMERCIAL

## 2020-07-29 ENCOUNTER — OFFICE VISIT (OUTPATIENT)
Dept: GASTROENTEROLOGY | Facility: CLINIC | Age: 55
End: 2020-07-29
Payer: COMMERCIAL

## 2020-07-29 ENCOUNTER — TELEPHONE (OUTPATIENT)
Dept: GASTROENTEROLOGY | Facility: CLINIC | Age: 55
End: 2020-07-29

## 2020-07-29 VITALS
DIASTOLIC BLOOD PRESSURE: 70 MMHG | WEIGHT: 104.4 LBS | HEART RATE: 68 BPM | BODY MASS INDEX: 19.21 KG/M2 | TEMPERATURE: 98.3 F | HEIGHT: 62 IN | SYSTOLIC BLOOD PRESSURE: 117 MMHG

## 2020-07-29 DIAGNOSIS — K64.9 HEMORRHOIDS, UNSPECIFIED HEMORRHOID TYPE: Primary | ICD-10-CM

## 2020-07-29 DIAGNOSIS — E61.1 LOW IRON: ICD-10-CM

## 2020-07-29 DIAGNOSIS — K59.00 CONSTIPATION, UNSPECIFIED CONSTIPATION TYPE: ICD-10-CM

## 2020-07-29 LAB
ERYTHROCYTE [DISTWIDTH] IN BLOOD BY AUTOMATED COUNT: 12.6 % (ref 11.6–15.1)
HCT VFR BLD AUTO: 39.3 % (ref 34.8–46.1)
HGB BLD-MCNC: 13.2 G/DL (ref 11.5–15.4)
MCH RBC QN AUTO: 30.9 PG (ref 26.8–34.3)
MCHC RBC AUTO-ENTMCNC: 33.6 G/DL (ref 31.4–37.4)
MCV RBC AUTO: 92 FL (ref 82–98)
PLATELET # BLD AUTO: 293 THOUSANDS/UL (ref 149–390)
PMV BLD AUTO: 8.5 FL (ref 8.9–12.7)
RBC # BLD AUTO: 4.27 MILLION/UL (ref 3.81–5.12)
WBC # BLD AUTO: 8.37 THOUSAND/UL (ref 4.31–10.16)

## 2020-07-29 PROCEDURE — 36415 COLL VENOUS BLD VENIPUNCTURE: CPT

## 2020-07-29 PROCEDURE — 99213 OFFICE O/P EST LOW 20 MIN: CPT | Performed by: INTERNAL MEDICINE

## 2020-07-29 PROCEDURE — 1036F TOBACCO NON-USER: CPT | Performed by: INTERNAL MEDICINE

## 2020-07-29 PROCEDURE — 3008F BODY MASS INDEX DOCD: CPT | Performed by: INTERNAL MEDICINE

## 2020-07-29 PROCEDURE — 85027 COMPLETE CBC AUTOMATED: CPT

## 2020-07-29 NOTE — PROGRESS NOTES
[facility] - Outpatient Follow-up Note  Beverley Méndez 48 y o  female MRN: [id number]  Encounter: [id number]          ASSESSMENT AND PLAN:      1  Constipation  - continue dietary fiber (fruits and veggies), increase hydration with water and increase activity/exercise  - continue fiber supplement  daily   - stop miralax daily and use as needed    2  Hemorrhoids  - most likely cause of rectal bleeding which has stopped now  - check CBC    3  Colon cancer screening  - repeat colonoscopy in 5 years due to fair prep    4  Co-morbidities: allergies, sinus bradycardia, cervical radiculopathy, arthritis, herniated lumbar disc, fibromyalgia, narcolepsy, vit D def    Follow up as needed     ______________________________________________________________________    SUBJECTIVE:  Ms Alison Land is a 47 yo W who presents for follow up of rectal bleeding and constipation  Co-morbidities: allergies, sinus bradycardia, cervical radiculopathy, arthritis, herniated lumbar disc, fibromyalgia, narcolepsy, vit D def     Colonoscopy 5/6/2020- 1 small polyp, tics, int hemorrhoids (fair prep only)  Path- benign colon tissue    Rectal bleeding  - no blood in stool now  - cannot remember last episode (at least before colonoscopy)    Constipation  - 1 BM per day  - BSS 5   - stopped iron (stopped March 2020)  - take metamucil daily  - drinks a lot of water daily (24 cups per day)   - uses miralax 1/2 capful every other day     ROS: No abd pain, nausea, vomiting, heartburn, acid reflux, odynophagia, dysphagia, hematemesis, melena, early satiety, appetite changes, wt loss  REVIEW OF SYSTEMS IS OTHERWISE NEGATIVE        Historical Information   Past Medical History:   Diagnosis Date    Anxiety     Back problem     Shoulder pain      Past Surgical History:   Procedure Laterality Date    BREAST SURGERY      COLONOSCOPY      DILATION AND CURETTAGE OF UTERUS      LEG SURGERY Left     LEG SURGERY      SHOULDER ARTHROSCOPY Right     shoulder     Social History   Social History     Substance and Sexual Activity   Alcohol Use Not Currently    Comment: occassional     Social History     Substance and Sexual Activity   Drug Use No     Social History     Tobacco Use   Smoking Status Former Smoker   Smokeless Tobacco Never Used     Family History   Problem Relation Age of Onset    Breast cancer Paternal Grandmother     Colon cancer Paternal Grandmother     Arthritis Family     Diabetes Family     Hypertension Family     Osteoporosis Family     Ovarian cancer Family     Uterine cancer Family        Meds/Allergies       Current Outpatient Medications:     Calcium Carbonate-Vitamin D (CALCIUM 500 + D PO)    DULoxetine (CYMBALTA) 30 mg delayed release capsule    gabapentin (NEURONTIN) 300 mg capsule    Glucosamine-Chondroit-Vit C-Mn (GLUCOSAMINE-CHONDROITIN) CAPS    GLUCOSAMINE-CHONDROITIN PO    ketoconazole (NIZORAL) 2 % cream    lidocaine (LMX) 4 % cream    meloxicam (MOBIC) 15 mg tablet    multivitamin (THERAGRAN) TABS    polyethylene glycol (GLYCOLAX) powder    tiZANidine (ZANAFLEX) 2 mg tablet    Allergies   Allergen Reactions    Amitriptyline      Other reaction(s): Constipation, Dry mouth           Objective     Blood pressure 117/70, pulse 68, temperature 98 3 °F (36 8 °C), temperature source Tympanic, height 5' 2" (1 575 m), weight 47 4 kg (104 lb 6 4 oz)  Body mass index is 19 1 kg/m²  PHYSICAL EXAM:      General Appearance:   Alert, cooperative, no distress   HEENT:   Normocephalic, atraumatic, anicteric  Neck:  Supple, symmetrical, trachea midline   Lungs:   Clear to auscultation bilaterally; no rales, rhonchi or wheezing; respirations unlabored    Heart[de-identified]   Regular rate and rhythm; no murmur, rub, or gallop     Abdomen:   Soft, non-tender, non-distended; normal bowel sounds; no masses, no organomegaly    Rectal:   Deferred   Neuro:    Alert, oriented, no gross deficits, normal strength and tone   Extremities:  No cyanosis, clubbing or edema    Psych:  Normal mood and affect    Skin:  No jaundice, rashes, or lesions    Lymph nodes:  No palpable cervical lymphadenopathy        Lab Results:   No visits with results within 1 Day(s) from this visit  Latest known visit with results is:   Hospital Outpatient Visit on 05/06/2020   Component Date Value    Case Report 05/06/2020                      Value:Surgical Pathology Report                         Case: [id number]                                   Authorizing Provider:  Aurelia Love MD  Collected:           05/06/2020 1025              Ordering Location:     [facility] Received:            05/06/2020 1050                                     Operating Room                                                               Pathologist:           Maria E Benedict MD                                                             Specimen:    Large Intestine, Transverse Colon, polyp retrieved by cold biopsy forceps                  Final Diagnosis 05/06/2020                      Value: This result contains rich text formatting which cannot be displayed here   Additional Information 05/06/2020                      Value: This result contains rich text formatting which cannot be displayed here  Joslyn Camarena Gross Description 05/06/2020                      Value: This result contains rich text formatting which cannot be displayed here   Clinical Information 05/06/2020                      Value:GERD with esophagitis  Rectal bleeding  · One flat polyp measuring smaller than 5 mm in the mid transverse colon; performed complete piecemeal removal by cold biopsy  · Small mild diverticula in the sigmoid colon  · Small and internal hemorrhoids         Radiology Results:   No results found

## 2020-07-29 NOTE — PATIENT INSTRUCTIONS
Constipation  - continue dietary fiber (fruits and veggies), increase hydration with water and increase activity/exercise  - continue fiber supplement (metamucil, citrucel, or benefiber) daily (over the counter, generic)  - stop miralax daily and use as needed    History of low iron  - check CBC (blood count)    Colon cancer screening  - repeat colonoscopy in 5 years    Follow up as needed    Gave labs  patient expressed understanding

## 2020-08-03 ENCOUNTER — OFFICE VISIT (OUTPATIENT)
Dept: URGENT CARE | Facility: CLINIC | Age: 55
End: 2020-08-03
Payer: COMMERCIAL

## 2020-08-03 ENCOUNTER — APPOINTMENT (OUTPATIENT)
Dept: RADIOLOGY | Facility: CLINIC | Age: 55
End: 2020-08-03
Payer: COMMERCIAL

## 2020-08-03 VITALS
BODY MASS INDEX: 19.58 KG/M2 | WEIGHT: 106.4 LBS | HEART RATE: 66 BPM | SYSTOLIC BLOOD PRESSURE: 143 MMHG | TEMPERATURE: 98.1 F | DIASTOLIC BLOOD PRESSURE: 63 MMHG | HEIGHT: 62 IN | RESPIRATION RATE: 16 BRPM | OXYGEN SATURATION: 98 %

## 2020-08-03 DIAGNOSIS — S92.535A CLOSED NONDISPLACED FRACTURE OF DISTAL PHALANX OF LESSER TOE OF LEFT FOOT, INITIAL ENCOUNTER: Primary | ICD-10-CM

## 2020-08-03 DIAGNOSIS — S99.922A FOOT INJURY, LEFT, INITIAL ENCOUNTER: ICD-10-CM

## 2020-08-03 PROCEDURE — 73630 X-RAY EXAM OF FOOT: CPT

## 2020-08-03 PROCEDURE — 99212 OFFICE O/P EST SF 10 MIN: CPT | Performed by: PHYSICIAN ASSISTANT

## 2020-08-03 PROCEDURE — S9083 URGENT CARE CENTER GLOBAL: HCPCS | Performed by: PHYSICIAN ASSISTANT

## 2020-08-03 NOTE — PATIENT INSTRUCTIONS
Buddy tape toes for comfort measures  Tylenol Motrin for pain as needed  Toe Fracture   WHAT YOU NEED TO KNOW:   A toe fracture is a break in 1 or more of the bones in your toe  It is most commonly caused by a direct blow to the toe  The bones in your toe may just be broken, or they may be out of place or   DISCHARGE INSTRUCTIONS:   Return to the emergency department if:   · Blood soaks through your bandage  · You have severe pain in your toe  · Your toe is cold or numb  Contact your healthcare provider if:   · You have a fever  · Your pain does not go away, even after treatment  · Your toe continues to hurt even after it has healed  · You have questions or concerns about your condition or care  Medicines: You may need any of the following:  · NSAIDs , such as ibuprofen, help decrease swelling, pain, and fever  This medicine is available with or without a doctor's order  NSAIDs can cause stomach bleeding or kidney problems in certain people  If you take blood thinner medicine, always ask your healthcare provider if NSAIDs are safe for you  Always read the medicine label and follow directions  · Prescription pain medicine  may be given  Ask your healthcare provider how to take this medicine safely  Some prescription pain medicines contain acetaminophen  Do not take other medicines that contain acetaminophen without talking to your healthcare provider  Too much acetaminophen may cause liver damage  Prescription pain medicine may cause constipation  Ask your healthcare provider how to prevent or treat constipation  · Antibiotics  treat a bacterial infection  You may need antibiotics if you have an open wound  · Take your medicine as directed  Contact your healthcare provider if you think your medicine is not helping or if you have side effects  Tell him of her if you are allergic to any medicine  Keep a list of the medicines, vitamins, and herbs you take   Include the amounts, and when and why you take them  Bring the list or the pill bottles to follow-up visits  Carry your medicine list with you in case of an emergency  Self-care:   · Use torsten tape, an elastic bandage, or a splint as directed  These help keep your toe in its correct position as it heals  Torsten tape is when your fractured toe and the toe next to it are taped together  · Use support devices  including a cane, crutches, walking boot, or hard soled shoe as directed  These help protect your broken toe and limit movement so it can heal     · Rest  your toe so that it can heal  Return to normal activities as directed  · Apply ice  on your toe for 15 to 20 minutes every hour or as directed  Use an ice pack, or put crushed ice in a plastic bag  Cover it with a towel  Ice helps prevent tissue damage and decreases swelling and pain  · Elevate  your toe above the level of your heart as often as you can  This will help decrease swelling and pain  Prop your toe on pillows or blankets to keep it elevated comfortably  Follow up with your healthcare provider as directed: You may need to see a podiatrist in 1 to 2 weeks  Write down your questions so you remember to ask them during your visits  © 2017 [address] Information is for End User's use only and may not be sold, redistributed or otherwise used for commercial purposes  All illustrations and images included in CareNotes® are the copyrighted property of [employer]  or [employer]  The above information is an  only  It is not intended as medical advice for individual conditions or treatments  Talk to your doctor, nurse or pharmacist before following any medical regimen to see if it is safe and effective for you

## 2020-08-24 ENCOUNTER — TELEPHONE (OUTPATIENT)
Dept: GASTROENTEROLOGY | Facility: AMBULARY SURGERY CENTER | Age: 55
End: 2020-08-24

## 2020-08-24 NOTE — TELEPHONE ENCOUNTER
----- Message from [facility], MD sent at 8/17/2020  9:40 PM EDT -----  Please let pt know that blood count is normal    Continue fiber daily  Use miralax as needed      Thank you,  Nancy Samayoa

## 2020-09-01 ENCOUNTER — APPOINTMENT (OUTPATIENT)
Dept: RADIOLOGY | Facility: CLINIC | Age: 55
End: 2020-09-01
Payer: COMMERCIAL

## 2020-09-01 ENCOUNTER — OFFICE VISIT (OUTPATIENT)
Dept: URGENT CARE | Facility: CLINIC | Age: 55
End: 2020-09-01
Payer: COMMERCIAL

## 2020-09-01 VITALS
OXYGEN SATURATION: 98 % | SYSTOLIC BLOOD PRESSURE: 115 MMHG | BODY MASS INDEX: 19.32 KG/M2 | WEIGHT: 105 LBS | TEMPERATURE: 97.6 F | DIASTOLIC BLOOD PRESSURE: 60 MMHG | HEIGHT: 62 IN | HEART RATE: 58 BPM | RESPIRATION RATE: 16 BRPM

## 2020-09-01 DIAGNOSIS — M25.572 ACUTE LEFT ANKLE PAIN: Primary | ICD-10-CM

## 2020-09-01 DIAGNOSIS — M25.572 ACUTE LEFT ANKLE PAIN: ICD-10-CM

## 2020-09-01 PROCEDURE — 73610 X-RAY EXAM OF ANKLE: CPT

## 2020-09-01 PROCEDURE — S9083 URGENT CARE CENTER GLOBAL: HCPCS | Performed by: PHYSICIAN ASSISTANT

## 2020-09-01 PROCEDURE — 99213 OFFICE O/P EST LOW 20 MIN: CPT | Performed by: PHYSICIAN ASSISTANT

## 2020-09-01 NOTE — PATIENT INSTRUCTIONS
Start an anti-inflammatory of your choice but take with food  There is no improvement follow-up with your family doctor  Arthralgia   WHAT YOU NEED TO KNOW:   Arthralgia is pain in one or more joints, with no inflammation  It may be short-term and get better within 6 to 8 weeks  Arthralgia can be an early sign of arthritis  Arthralgia may be caused by a medical condition, such as a hormone disorder or a tumor  It may also be caused by an infection or injury  DISCHARGE INSTRUCTIONS:   Medicines: The following medicines may  be ordered for you:  · Acetaminophen  decreases pain  Ask how much to take and how often to take it  Follow directions  Acetaminophen can cause liver damage if not taken correctly  · NSAIDs  decrease pain and prevent swelling  Ask your healthcare provider which medicine is right for you  Ask how much to take and when to take it  Take as directed  NSAIDs can cause stomach bleeding and kidney problems if not taken correctly  · Pain relief cream  decreases pain  Use this cream as directed  · Take your medicine as directed  Contact your healthcare provider if you think your medicine is not helping or if you have side effects  Tell him of her if you are allergic to any medicine  Keep a list of the medicines, vitamins, and herbs you take  Include the amounts, and when and why you take them  Bring the list or the pill bottles to follow-up visits  Carry your medicine list with you in case of an emergency  Follow up with your healthcare provider or specialist as directed:  Write down your questions so you remember to ask them during your visits  Self-care:   · Apply heat  to help decrease pain  Use a heating pad or heat wrap  Apply heat for 20 to 30 minutes every 2 hours for as many days as directed  · Rest  as much as possible  Avoid activities that cause joint pain  · Apply ice  to help decrease swelling and pain  Ice may also help prevent tissue damage   Use an ice pack, or put crushed ice in a plastic bag  Cover it with a towel and place it on your painful joint for 15 to 20 minutes every hour or as directed  · Support  the joint with a brace or elastic wrap as directed  · Elevate  your joint above the level of your heart as often as you can to help decrease swelling and pain  Prop your painful joint on pillows or blankets to keep it elevated comfortably  · Lose weight  if you are overweight  Extra weight can put pressure on your joints and cause more pain  Ask your healthcare provider how much you should weigh  Ask him to help you create a weight loss plan  · Exercise  regularly to help improve joint movement and to decrease pain  Ask about the best exercise plan for you  Low-impact exercises can help take the pressure off your joints  Examples are walking, swimming, and water aerobics  Physical therapy:  A physical therapist teaches you exercises to help improve movement and strength, and to decrease pain  Ask your healthcare provider if physical therapy is right for you  Contact your healthcare provider or specialist if:   · You have a fever  · You continue to have joint pain that cannot be relieved with heat, ice, or medicine  · You have pain and inflammation around your joint  · You have questions or concerns about your condition or care  Return to the emergency department if:   · You have sudden, severe pain when you move your joint  · You have a fever and shaking chills  · You cannot move your joint  · You lose feeling on the side of your body where you have the painful joint  © 2017 [address] Information is for End User's use only and may not be sold, redistributed or otherwise used for commercial purposes  All illustrations and images included in CareNotes® are the copyrighted property of [employer]  or [employer]  The above information is an  only   It is not intended as medical advice for individual conditions or treatments  Talk to your doctor, nurse or pharmacist before following any medical regimen to see if it is safe and effective for you

## 2020-09-01 NOTE — PROGRESS NOTES
[facility]        NAME: Aurelia Ball is a 50 y o  female  : 1965    MRN: [id number]  DATE: 2020  TIME: 10:40 AM    Assessment and Plan   Acute left ankle pain [M25 572]  1  Acute left ankle pain  XR ankle 3+ vw left         Patient Instructions     Start an anti-inflammatory of your choice but take with food  There is no improvement follow-up with your family doctor  Follow up with PCP in 3-5 days  Proceed to  ER if symptoms worsen  Chief Complaint     Chief Complaint   Patient presents with    Foot Pain     c/o left foot and ankle pain since yesterday  Denies injury  History of Present Illness       Patient started developing ankle pain last evening  This morning when she woke she noticed increased pain  The pain predominantly occurs weight-bearing  Denies any swelling or ecchymosis or known injury  No previous history of gout  No known tick bites, fever, chills, rashes  Review of Systems   Review of Systems   Constitutional: Negative for fever  Musculoskeletal: Positive for arthralgias  Skin: Negative for rash  Neurological: Negative for weakness and numbness           Current Medications       Current Outpatient Medications:     Calcium Carbonate-Vitamin D (CALCIUM 500 + D PO), Take 1 tablet by mouth daily, Disp: , Rfl:     DULoxetine (CYMBALTA) 30 mg delayed release capsule, Take 1 capsule (30 mg total) by mouth daily, Disp: 90 capsule, Rfl: 2    gabapentin (NEURONTIN) 300 mg capsule, Take 1 capsule (300 mg total) by mouth 2 (two) times a day (Patient taking differently: Take 300 mg by mouth 2 (two) times a day as needed ), Disp: 180 capsule, Rfl: 2    Glucosamine-Chondroit-Vit C-Mn (GLUCOSAMINE-CHONDROITIN) CAPS, Take by mouth, Disp: , Rfl:     GLUCOSAMINE-CHONDROITIN PO, Take 2 caplets by mouth daily, Disp: , Rfl:     lidocaine (LMX) 4 % cream, Apply topically as needed for mild pain, Disp: 30 g, Rfl: 0    meloxicam (MOBIC) 15 mg tablet, Take 1 tablet (15 mg total) by mouth daily, Disp: 90 tablet, Rfl: 2    multivitamin (THERAGRAN) TABS, Take 1 tablet by mouth daily, Disp: , Rfl:     tiZANidine (ZANAFLEX) 2 mg tablet, Take 2 mg by mouth as needed for muscle spasms , Disp: , Rfl:     ketoconazole (NIZORAL) 2 % cream, APPLY TWICE A DAY FOR 2 WEEKS AS NEEDED FOR ITCHING, Disp: , Rfl:     polyethylene glycol (GLYCOLAX) powder, Take 17 g by mouth 2 (two) times a day (Patient not taking: Reported on 9/1/2020), Disp: 578 g, Rfl: 3    Current Allergies     Allergies as of 09/01/2020 - Reviewed 09/01/2020   Allergen Reaction Noted    Amitriptyline  07/01/2013            The following portions of the patient's history were reviewed and updated as appropriate: allergies, current medications, past family history, past medical history, past social history, past surgical history and problem list      Past Medical History:   Diagnosis Date    Anxiety     Back problem     Shoulder pain        Past Surgical History:   Procedure Laterality Date    BREAST SURGERY      COLONOSCOPY      DILATION AND CURETTAGE OF UTERUS      LEG SURGERY Left     LEG SURGERY      SHOULDER ARTHROSCOPY Right     shoulder       Family History   Problem Relation Age of Onset    Breast cancer Paternal Grandmother     Colon cancer Paternal Grandmother     Arthritis Family     Diabetes Family     Hypertension Family     Osteoporosis Family     Ovarian cancer Family     Uterine cancer Family          Medications have been verified  Objective   /60 (BP Location: Left arm, Patient Position: Sitting, Cuff Size: Adult)   Pulse 58   Temp 97 6 °F (36 4 °C) (Temporal)   Resp 16   Ht 5' 2" (1 575 m)   Wt 47 6 kg (105 lb)   SpO2 98%   BMI 19 20 kg/m²        Physical Exam     Physical Exam  Vitals signs and nursing note reviewed  Constitutional:       Appearance: Normal appearance  She is normal weight  HENT:      Head: Normocephalic and atraumatic  Cardiovascular:      Rate and Rhythm: Normal rate and regular rhythm  Pulmonary:      Effort: Pulmonary effort is normal    Musculoskeletal:      Comments: No swelling, erythema, ecchymosis or rashes of the left ankle or foot  There is slight tenderness on palpation over the medial malleolus  There is full range of motion of the ankle and toes  Capillary refill less than 2 seconds  Skin:     General: Skin is warm and dry  Neurological:      Mental Status: She is alert  X-ray viewed by by me and independently reviewed by me contemporaneously as no acute bony abnormality

## 2020-09-14 ENCOUNTER — OFFICE VISIT (OUTPATIENT)
Dept: FAMILY MEDICINE CLINIC | Facility: HOME HEALTHCARE | Age: 55
End: 2020-09-14
Payer: COMMERCIAL

## 2020-09-14 VITALS
HEART RATE: 72 BPM | WEIGHT: 105.6 LBS | RESPIRATION RATE: 18 BRPM | DIASTOLIC BLOOD PRESSURE: 70 MMHG | HEIGHT: 62 IN | SYSTOLIC BLOOD PRESSURE: 108 MMHG | TEMPERATURE: 98.7 F | BODY MASS INDEX: 19.43 KG/M2

## 2020-09-14 DIAGNOSIS — M79.645 PAIN OF LEFT THUMB: Primary | ICD-10-CM

## 2020-09-14 PROCEDURE — 99213 OFFICE O/P EST LOW 20 MIN: CPT | Performed by: FAMILY MEDICINE

## 2020-09-14 NOTE — PROGRESS NOTES
[address]       NAME: Kenny De Santiago is a 50 y o  female  : 1965    MRN: [id number]  DATE: 2020  TIME: 11:16 AM    Assessment and Plan   Diagnoses and all orders for this visit:    Pain of left thumb  -     Ambulatory referral to PT/OT hand therapy; Future        No problem-specific Assessment & Plan notes found for this encounter  Patient Instructions           Chief Complaint     Chief Complaint   Patient presents with    Hand Pain     left thumb has been painful x6-9months  Has not tried OTC meds  Didn't want to treat pain without knowing cause  Pain ranges from 3-8 depending on what she's doing  History of Present Illness       Daysi Reynolds presents with complaint of left thumb pain over the last 6-9 months  She has not tried meds  She does have a history of fibromyalgia, takes Cymbalta and would like to hold off on dose adjustment  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  Denies any trauma/numbness/tingling/radiation into wrist   She is agreeable to trying OTC hand therapy to see if there is any improvement  No other complaints today  Review of Systems   Review of Systems   Constitutional: Negative  Negative for chills, fatigue and fever  HENT: Negative  Negative for sinus pain  Respiratory: Negative  Negative for cough and shortness of breath  Cardiovascular: Negative  Gastrointestinal: Negative  Negative for blood in stool, constipation, diarrhea, nausea and vomiting  Genitourinary: Negative  Musculoskeletal: Positive for arthralgias  Left thumb pain   Skin: Negative  Negative for rash  Neurological: Negative  Psychiatric/Behavioral: Negative  Negative for suicidal ideas           Current Medications       Current Outpatient Medications:     Calcium Carbonate-Vitamin D (CALCIUM 500 + D PO), Take 1 tablet by mouth daily, Disp: , Rfl:     DULoxetine (CYMBALTA) 30 mg delayed release capsule, Take 1 capsule (30 mg total) by mouth daily, Disp: 90 capsule, Rfl: 2    gabapentin (NEURONTIN) 300 mg capsule, Take 1 capsule (300 mg total) by mouth 2 (two) times a day (Patient taking differently: Take 300 mg by mouth 2 (two) times a day as needed ), Disp: 180 capsule, Rfl: 2    GLUCOSAMINE-CHONDROITIN PO, Take 2 caplets by mouth daily, Disp: , Rfl:     ketoconazole (NIZORAL) 2 % cream, APPLY TWICE A DAY FOR 2 WEEKS AS NEEDED FOR ITCHING, Disp: , Rfl:     lidocaine (LMX) 4 % cream, Apply topically as needed for mild pain, Disp: 30 g, Rfl: 0    meloxicam (MOBIC) 15 mg tablet, Take 1 tablet (15 mg total) by mouth daily, Disp: 90 tablet, Rfl: 2    multivitamin (THERAGRAN) TABS, Take 1 tablet by mouth daily, Disp: , Rfl:     polyethylene glycol (GLYCOLAX) powder, Take 17 g by mouth 2 (two) times a day (Patient taking differently: Take 17 g by mouth 2 (two) times a day as needed ), Disp: 578 g, Rfl: 3    tiZANidine (ZANAFLEX) 2 mg tablet, Take 2 mg by mouth as needed for muscle spasms , Disp: , Rfl:     Current Allergies     Allergies as of 09/14/2020 - Reviewed 09/14/2020   Allergen Reaction Noted    Amitriptyline  07/01/2013            The following portions of the patient's history were reviewed and updated as appropriate: allergies, current medications, past family history, past medical history, past social history, past surgical history and problem list      Past Medical History:   Diagnosis Date    Anxiety     Back problem     Shoulder pain        Past Surgical History:   Procedure Laterality Date    BREAST SURGERY      COLONOSCOPY      DILATION AND CURETTAGE OF UTERUS      LEG SURGERY Left     LEG SURGERY      SHOULDER ARTHROSCOPY Right     shoulder       Family History   Problem Relation Age of Onset    Breast cancer Paternal Grandmother     Colon cancer Paternal Grandmother     Arthritis Family     Diabetes Family     Hypertension Family     Osteoporosis Family     Ovarian cancer Family     Uterine cancer Family          Medications have been verified  Objective   /70 (BP Location: Right arm, Patient Position: Sitting, Cuff Size: Adult)   Pulse 72   Temp 98 7 °F (37 1 °C) (Temporal)   Resp 18   Ht 5' 2" (1 575 m)   Wt 47 9 kg (105 lb 9 6 oz)   BMI 19 31 kg/m²        Physical Exam     Physical Exam  Vitals signs and nursing note reviewed  Constitutional:       Appearance: Normal appearance  HENT:      Mouth/Throat:      Mouth: Mucous membranes are moist       Pharynx: Oropharynx is clear  Eyes:      Conjunctiva/sclera: Conjunctivae normal       Pupils: Pupils are equal, round, and reactive to light  Neck:      Musculoskeletal: Normal range of motion and neck supple  Cardiovascular:      Rate and Rhythm: Normal rate and regular rhythm  Pulses: Normal pulses  Heart sounds: Normal heart sounds  Pulmonary:      Effort: Pulmonary effort is normal       Breath sounds: Normal breath sounds  Abdominal:      General: Bowel sounds are normal       Palpations: Abdomen is soft  Musculoskeletal: Normal range of motion  Arms:    Skin:     General: Skin is warm and dry  Capillary Refill: Capillary refill takes less than 2 seconds  Neurological:      Mental Status: She is alert and oriented to person, place, and time  Psychiatric:         Mood and Affect: Mood normal          Behavior: Behavior normal          Thought Content:  Thought content normal          Judgment: Judgment normal

## 2020-09-24 ENCOUNTER — EVALUATION (OUTPATIENT)
Dept: OCCUPATIONAL THERAPY | Facility: CLINIC | Age: 55
End: 2020-09-24
Payer: COMMERCIAL

## 2020-09-24 DIAGNOSIS — M79.645 PAIN OF LEFT THUMB: Primary | ICD-10-CM

## 2020-09-24 PROCEDURE — 97165 OT EVAL LOW COMPLEX 30 MIN: CPT

## 2020-09-24 PROCEDURE — 97535 SELF CARE MNGMENT TRAINING: CPT

## 2020-09-24 NOTE — PROGRESS NOTES
OT Evaluation     Today's date: 2020  Patient name: Ramya Zeng  : 1965  MRN: [id number]  Referring provider: RENEE Grove  Dx:   Encounter Diagnosis     ICD-10-CM    1  Pain of left thumb  M79 645 Ambulatory referral to PT/OT hand therapy                  Assessment  Assessment details: Patient presents to OP OT services with diagnosis of left thumb pain  Patients recent MD appointment was on 2020  Patient has no follow up appointment scheduled  Patient reports symptoms onset approximately 6-9 months ago  Patient experiences tenderness and pain at base of thumb, feels like joint pain  Patient experiences pain with function and decreased activity tolerance  She has decreased performance in ADL/IADL's, specifically washing dishes, and meal prep  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  Patient presents with decrease sensation, decreased thumb ROM, decreased pinch strength, and decreased pinch strength  Patient presents with CTS like symptoms and has a history of CTS  Additionally, patient m ay have weakness of the thumb as she tested positive for the Froments, Phalyns, Axial Load Test, and Carpal Compression Test        Impairments: abnormal or restricted ROM, activity intolerance, impaired physical strength and pain with function  Understanding of Dx/Px/POC: fair   Prognosis: fair  Prognosis details:  Anxiety     Back problem     Shoulder pain         Goals  STG    Patient will decrease pain to 4/10 at worst in 2 weeks  Patient will increase  strength by 5-10#'s in 2 weeks  Patient will increase pinch strength by 3-5#'s in 2 weeks  Patient will decrease sensation deficits by 25-50% in 2 weeks  Patient will increase activity tolerance to 5 minutes in 4 weeks  Patient will be I in HEP  LTG    Patient will have no pain with function in 4 weeks      Patient will increase  strength by additional 5-10#'s in 4 weeks  Patient will increase pinch strength by an additional 3-5#'s in 4 weeks  Patient will decrease sensation deficits by % in 4 weeks  Patient will increase activity tolerance to 10 minutes in 4 weeks  Plan  Plan details: Patient presents to OP OT services with diagnosis of L thumb pain  Patient presents with decrease sensation, decreased thumb ROM, decreased pinch strength, and decreased pinch strength  Patient will benefit from attending OP OT two times a week for four weeks in order to decrease symptoms and maximize function  Thank you for referring!    Patient would benefit from: OT eval and skilled occupational therapy  Referral necessary: Yes  Planned modality interventions: ultrasound, unattended electrical stimulation, cryotherapy and thermotherapy: hydrocollator packs  Planned therapy interventions: manual therapy, strengthening, therapeutic activities, therapeutic exercise and home exercise program  Frequency: 2x week  Duration in visits: 8  Duration in weeks: 4  Treatment plan discussed with: patient        Subjective Evaluation    History of Present Illness  Date of onset: 2020  Quality of life: fair    Pain  Current pain ratin  At best pain ratin  At worst pain ratin  Location: L thumb  Quality: discomfort  Relieving factors: heat and ice    Social Support    Employment status: working  Hand dominance: right    Treatments  Current treatment: occupational therapy  Patient Goals  Patient goals for therapy: decreased pain, increased motion, increased strength and independence with ADLs/IADLs          Objective     Neurological Testing     Sensation     Wrist/Hand   Left   Diminished: light touch    Right   Diminished: light touch    Comments   Left light touch: unrelated to diagnosis   Right light touch: unrelated to diagnosis    Active Range of Motion     Left Thumb   Flexion     CMC: 30 degrees    MP: 45 degrees    DIP: 60 degrees  Extension MP: 15 degrees    DIP: 25 degrees  Palmar Abduction     CMC: 47 degrees  Radial abduction    CMC: 26 degrees  Adduction    CMC: 0 degrees  Opposition: WNL    Right Thumb   Flexion     CMC: 25    MP: 75    DIP: 110  Extension     MP: 10    DIP: 40  Palmar Abduction    CMC: 51  Radial Abduction    CMC: 66  Adduction    CMC: 0  Opposition: WNL    Additional Active Range of Motion Details  B/L composite fist    Strength/Myotome Testing     Left Wrist/Hand   Normal wrist strength  Wrist extension: 5  Wrist flexion: 5  Radial deviation: 5  Ulnar deviation: 5     (2nd hand position)     Trial 1: 20    Thumb Strength  Key/Lateral Pinch     Trial 1: 1  Tip/Two-Point Pinch     Trial 1: 0  Palmar/Three-Point Pinch     Trial 1: 0    Right Wrist/Hand   Normal wrist strength  Wrist extension: 5  Wrist flexion: 5  Radial deviation: 5  Ulnar deviation: 5     (2nd hand position)     Trial 1: 45    Thumb Strength   Key/Lateral Pinch     Trial 1: 6  Tip/Two-Point Pinch     Trial 1: 4  Palmar/Three-Point Pinch     Trial 1: 4    Additional Strength Details  Patient experienced pain on L side with  and pinch strength tests    Tests     Left Wrist/Hand   Positive Froment's sign and Phalen's sign  Negative AIN OK sign, Finkelstein's and Tinel's sign (medial nerve)  Right Wrist/Hand   Positive Phalen's sign  Additional Tests Details  Positive axial Load test L thumb, with clicking  Positive Carpal Compression Test L wrist, pins and needles on dorsal MCP's and medial 3 fingers  Swelling     Left Wrist/Hand   Thumb     Proximal: 7 8 cm    Distal: 5 6 cm  Circumference wrist: 13 9 cm    Right Wrist/Hand   Thumb     Proximal: 8 cm    Distal: 5 8 cm  Circumference wrist: 14 6 cm  Neuro Exam:     Functional outcomes   Left 9 peg hole test: 19 (seconds)  Right 9 hole peg test: 19 (seconds)             Precautions: N/A    Patient demonstrated understanding of HEP      Manuals 9/24/2020       PROM Thumb  ABD  Ext  Flex Manual Massage                        Neuro Re-Ed 9/24/2020                                                               Ther Ex 9/24/2020       Thumb stretches  ABD  MCP flex  IP flex  MCP Ext  IP Ext HEP       Isometrics  Thumb  Flex  Ext  ABD  ADD HEP       Radial Nerve Glides        Theraputty  Grasp  Pinches  Thumb Presses  ABD loop        TGE HEP       Isometrics Wrist  Flex  Ext  RD  UD HEP       Wrist flexor stretch HEP       Ther Activity 9/24/2020       Grooved pegboard        Bean bowl                                Modalities 9/24/2020               CP        Ultrasound        E-Stim

## 2020-09-28 ENCOUNTER — OFFICE VISIT (OUTPATIENT)
Dept: OCCUPATIONAL THERAPY | Facility: CLINIC | Age: 55
End: 2020-09-28
Payer: COMMERCIAL

## 2020-09-28 DIAGNOSIS — M79.645 PAIN OF LEFT THUMB: Primary | ICD-10-CM

## 2020-09-28 PROCEDURE — 97140 MANUAL THERAPY 1/> REGIONS: CPT

## 2020-09-28 PROCEDURE — 97110 THERAPEUTIC EXERCISES: CPT

## 2020-09-28 PROCEDURE — 97035 APP MDLTY 1+ULTRASOUND EA 15: CPT

## 2020-09-28 NOTE — PROGRESS NOTES
Daily Note     Today's date: 2020  Patient name: Austin Escalante  : 1965  MRN: [id number]  Referring provider: RENEE Antonio  Dx:   Encounter Diagnosis     ICD-10-CM    1  Pain of left thumb  M79 645                   Subjective: "My hand is feeling better"       Objective: See treatment diary below      Assessment: Tolerated treatment fair  Patient demonstrated fatigue post treatment and would benefit from continued OT  Patient tolerated additional therapeutic exercises okay and expressed slight pain during theraputty exercises  Patient requires several verbal cues to demonstrate proper techniques during therapeutic exercises  Plan: Continue per plan of care  Progress treatment as tolerated  Precautions: N/A    Patient demonstrated understanding of HEP      Manuals 2020      PROM Thumb  ABD  Ext  Flex    3 x 30 sec  3 x 30 sec  3 x 30 sec      Manual Massage  Thumb                      Neuro Re-Ed 2020                                                              Ther Ex 2020      Thumb stretches  ABD  MCP flex  IP flex  MCP Ext  IP Ext HEP       Isometrics  Thumb  Flex  Ext  ABD  ADD HEP     5 x 10 sec  5 x 10 sec  5 x 10 sec  5 x 10 sec      Radial Nerve Glides  5 x 10 sec      Theraputty  Grasp  Pinches  Thumb Presses  ABD loop  Yellow  1 x 10  1 x 10  1 x 10  1 x 10      TGE HEP       Isometrics Wrist  Flex  Ext  RD  UD HEP       Wrist flexor stretch HEP 1 x 30 sec      Ther Activity 2020      Grooved pegboard        Bean bowl  2 minutes  L 36 beans                              Modalities 2020        Performed start of session      CP        Ultrasound  0 8 intensity, 50% rate, 3 MHz performed thumb      E-Stim

## 2020-09-30 ENCOUNTER — OFFICE VISIT (OUTPATIENT)
Dept: OCCUPATIONAL THERAPY | Facility: CLINIC | Age: 55
End: 2020-09-30
Payer: COMMERCIAL

## 2020-09-30 DIAGNOSIS — M79.645 PAIN OF LEFT THUMB: Primary | ICD-10-CM

## 2020-09-30 PROCEDURE — 97110 THERAPEUTIC EXERCISES: CPT

## 2020-09-30 PROCEDURE — 97035 APP MDLTY 1+ULTRASOUND EA 15: CPT

## 2020-09-30 PROCEDURE — 97140 MANUAL THERAPY 1/> REGIONS: CPT

## 2020-09-30 NOTE — PROGRESS NOTES
Daily Note     Today's date: 2020  Patient name: Katina Mix  : 1965  MRN: [id number]  Referring provider: RENEE Hutchins  Dx:   Encounter Diagnosis     ICD-10-CM    1  Pain of left thumb  M79 645                   Subjective:  It's a moderate pain  Objective: See treatment diary below      Assessment: Tolerated treatment fair  Patient demonstrated fatigue post treatment, exhibited good technique with therapeutic exercises and would benefit from continued OT  Increase popping with thumb isometrics  Pain pre 2, post tx 4  Reports pain radiating up to her elbow post tx  Plan: Continue per plan of care  Progress treatment as tolerated  Precautions: N/A    Patient demonstrated understanding of HEP      Manuals 2020     PROM Thumb  ABD  Ext  Flex    3 x 30 sec  3 x 30 sec  3 x 30 sec   3 x 30 sec  3 x 30 sec  3 x 30 sec     Manual Massage  Thumb                      Neuro Re-Ed 2020                                                             Ther Ex 2020     Thumb stretches  ABD  MCP flex  IP flex  MCP Ext  IP Ext HEP       Isometrics  Thumb  Flex  Ext  ABD  ADD HEP     5 x 10 sec  5 x 10 sec  5 x 10 sec  5 x 10 sec     5 x 10 sec  5 x 10 sec  5 x 10 sec  5 x 10 sec     Radial Nerve Glides  5 x 10 sec 5 x 10 sec     Theraputty  Grasp  Pinches  Thumb Presses  ABD loop  Yellow  1 x 10  1 x 10  1 x 10  1 x 10 Yellow  1 x 10  1 x 10  1 x 10  1 x 10       TGE HEP       Isometrics Wrist  Flex  Ext  RD  UD HEP    X 5     Single Digiflex thumb   Red x 10     Clothespin          Wrist flexor stretch HEP 1 x 30 sec 1 x 30 sec     Ther Activity 2020     Grooved pegboard   1:39     Bean bowl  2 minutes  L 36 beans                              Modalities 2020       Performed start of session Performed start of session     CP        Ultrasound  0 8 intensity, 50% rate, 3 MHz performed thumb 0 8 intensity, 50% rate, 3 MHz performed thumb     E-Stim

## 2020-10-06 ENCOUNTER — OFFICE VISIT (OUTPATIENT)
Dept: OCCUPATIONAL THERAPY | Facility: CLINIC | Age: 55
End: 2020-10-06
Payer: COMMERCIAL

## 2020-10-06 DIAGNOSIS — M79.645 PAIN OF LEFT THUMB: Primary | ICD-10-CM

## 2020-10-06 PROCEDURE — 97110 THERAPEUTIC EXERCISES: CPT

## 2020-10-06 PROCEDURE — 97035 APP MDLTY 1+ULTRASOUND EA 15: CPT

## 2020-10-08 ENCOUNTER — OFFICE VISIT (OUTPATIENT)
Dept: OCCUPATIONAL THERAPY | Facility: CLINIC | Age: 55
End: 2020-10-08
Payer: COMMERCIAL

## 2020-10-08 DIAGNOSIS — M79.645 PAIN OF LEFT THUMB: Primary | ICD-10-CM

## 2020-10-08 PROCEDURE — 97110 THERAPEUTIC EXERCISES: CPT

## 2020-10-08 PROCEDURE — 97035 APP MDLTY 1+ULTRASOUND EA 15: CPT

## 2020-10-12 ENCOUNTER — EVALUATION (OUTPATIENT)
Dept: OCCUPATIONAL THERAPY | Facility: CLINIC | Age: 55
End: 2020-10-12
Payer: COMMERCIAL

## 2020-10-12 DIAGNOSIS — M79.645 PAIN OF LEFT THUMB: Primary | ICD-10-CM

## 2020-10-12 DIAGNOSIS — M54.2 CERVICAL SPINE PAIN: ICD-10-CM

## 2020-10-12 PROCEDURE — 97535 SELF CARE MNGMENT TRAINING: CPT

## 2020-10-12 PROCEDURE — 97110 THERAPEUTIC EXERCISES: CPT

## 2020-10-12 RX ORDER — GABAPENTIN 300 MG/1
300 CAPSULE ORAL 2 TIMES DAILY
Qty: 180 CAPSULE | Refills: 0 | Status: SHIPPED | OUTPATIENT
Start: 2020-10-12 | End: 2021-01-21 | Stop reason: SDUPTHER

## 2020-10-14 ENCOUNTER — OFFICE VISIT (OUTPATIENT)
Dept: OCCUPATIONAL THERAPY | Facility: CLINIC | Age: 55
End: 2020-10-14
Payer: COMMERCIAL

## 2020-10-14 DIAGNOSIS — M79.645 PAIN OF LEFT THUMB: Primary | ICD-10-CM

## 2020-10-14 PROCEDURE — 97110 THERAPEUTIC EXERCISES: CPT

## 2020-10-20 ENCOUNTER — OFFICE VISIT (OUTPATIENT)
Dept: OCCUPATIONAL THERAPY | Facility: CLINIC | Age: 55
End: 2020-10-20
Payer: COMMERCIAL

## 2020-10-20 DIAGNOSIS — M79.645 PAIN OF LEFT THUMB: Primary | ICD-10-CM

## 2020-10-20 PROCEDURE — 97535 SELF CARE MNGMENT TRAINING: CPT

## 2020-10-22 ENCOUNTER — APPOINTMENT (OUTPATIENT)
Dept: OCCUPATIONAL THERAPY | Facility: CLINIC | Age: 55
End: 2020-10-22
Payer: COMMERCIAL

## 2020-11-12 ENCOUNTER — HOSPITAL ENCOUNTER (OUTPATIENT)
Dept: RADIOLOGY | Facility: HOSPITAL | Age: 55
Discharge: HOME | End: 2020-11-12
Payer: COMMERCIAL

## 2020-11-12 ENCOUNTER — TRANSCRIBE ORDERS (OUTPATIENT)
Dept: ADMINISTRATIVE | Facility: HOSPITAL | Age: 55
End: 2020-11-12

## 2020-11-12 DIAGNOSIS — M79.18 DIFFUSE MYOFASCIAL PAIN SYNDROME: ICD-10-CM

## 2020-11-12 DIAGNOSIS — M54.2 CERVICALGIA: ICD-10-CM

## 2020-11-12 DIAGNOSIS — M46.1 SACROILIITIS, NOT ELSEWHERE CLASSIFIED (HCC): ICD-10-CM

## 2020-11-12 DIAGNOSIS — M46.1 SACROILIITIS, NOT ELSEWHERE CLASSIFIED (HCC): Primary | ICD-10-CM

## 2020-11-12 PROCEDURE — 72040 X-RAY EXAM NECK SPINE 2-3 VW: CPT

## 2020-11-12 PROCEDURE — 72110 X-RAY EXAM L-2 SPINE 4/>VWS: CPT

## 2020-11-15 NOTE — PROGRESS NOTES
[facility] [employer] [facility]        NAME: Chevy Romero is a 48 y o  female  : 1965    MRN: [id number]  DATE: August 3, 2020  TIME: 9:54 AM    Assessment and Plan   Closed nondisplaced fracture of distal phalanx of lesser toe of left foot, initial encounter [S92 535A]  1  Closed nondisplaced fracture of distal phalanx of lesser toe of left foot, initial encounter     2  Foot injury, left, initial encounter  XR foot 3+ vw left         Patient Instructions     Buddy tape toes for comfort measures  Tylenol Motrin for pain as needed  Follow up with PCP in 3-5 days  Proceed to  ER if symptoms worsen  Chief Complaint     Chief Complaint   Patient presents with    Foot Pain     c/o left foot pain after kicking an ottoman 2 nights ago  Denies previous injury  History of Present Illness       Patient inadvertently walked into an [place]oman in last evening injuring her left foot  Patient having pain in her 5th metatarsal and 5th toe  Review of Systems   Review of Systems   Musculoskeletal: Positive for arthralgias  Skin: Negative for rash and wound  Neurological: Negative for weakness           Current Medications       Current Outpatient Medications:     Calcium Carbonate-Vitamin D (CALCIUM 500 + D PO), Take 1 tablet by mouth daily, Disp: , Rfl:     DULoxetine (CYMBALTA) 30 mg delayed release capsule, Take 1 capsule (30 mg total) by mouth daily, Disp: 90 capsule, Rfl: 2    Glucosamine-Chondroit-Vit C-Mn (GLUCOSAMINE-CHONDROITIN) CAPS, Take by mouth, Disp: , Rfl:     GLUCOSAMINE-CHONDROITIN PO, Take 2 caplets by mouth daily, Disp: , Rfl:     ketoconazole (NIZORAL) 2 % cream, APPLY TWICE A DAY FOR 2 WEEKS AS NEEDED FOR ITCHING, Disp: , Rfl:     lidocaine (LMX) 4 % cream, Apply topically as needed for mild pain, Disp: 30 g, Rfl: 0    meloxicam (MOBIC) 15 mg tablet, Take 1 tablet (15 mg total) by mouth daily, Disp: 90 tablet, Rfl: 2    multivitamin (THERAGRAN) TABS, Take 1 tablet by mouth daily, Disp: , Rfl:     polyethylene glycol (GLYCOLAX) powder, Take 17 g by mouth 2 (two) times a day (Patient taking differently: Take 17 g by mouth daily ), Disp: 578 g, Rfl: 3    tiZANidine (ZANAFLEX) 2 mg tablet, Take 2 mg by mouth as needed for muscle spasms , Disp: , Rfl:     gabapentin (NEURONTIN) 300 mg capsule, Take 1 capsule (300 mg total) by mouth 2 (two) times a day (Patient taking differently: Take 300 mg by mouth 2 (two) times a day as needed ), Disp: 180 capsule, Rfl: 2    Current Allergies     Allergies as of 08/03/2020 - Reviewed 08/03/2020   Allergen Reaction Noted    Amitriptyline  07/01/2013            The following portions of the patient's history were reviewed and updated as appropriate: allergies, current medications, past family history, past medical history, past social history, past surgical history and problem list      Past Medical History:   Diagnosis Date    Anxiety     Back problem     Shoulder pain        Past Surgical History:   Procedure Laterality Date    BREAST SURGERY      COLONOSCOPY      DILATION AND CURETTAGE OF UTERUS      LEG SURGERY Left     LEG SURGERY      SHOULDER ARTHROSCOPY Right     shoulder       Family History   Problem Relation Age of Onset    Breast cancer Paternal Grandmother     Colon cancer Paternal Grandmother     Arthritis Family     Diabetes Family     Hypertension Family     Osteoporosis Family     Ovarian cancer Family     Uterine cancer Family          Medications have been verified  Objective   /63 (BP Location: Right arm, Patient Position: Sitting, Cuff Size: Adult)   Pulse 66   Temp 98 1 °F (36 7 °C) (Temporal)   Resp 16   Ht 5' 2"   Wt 48 3 kg (106 lb 6 4 oz)   SpO2 98%   BMI 19 46 kg/m²        Physical Exam     Physical Exam   HENT:   Head: Normocephalic and atraumatic  Cardiovascular: Normal rate and regular rhythm  Pulmonary/Chest: Effort normal    Abdominal: Normal appearance     Musculoskeletal: Comments: Ecchymosis of the left 5th toe and had distal 5th metatarsal   There is no swelling  There is tenderness over the distal 5th metatarsal and 5th toe  Limited range of motion secondary to pain  Capillary refill less than 2 seconds  Neurological: She is alert  Skin: Skin is warm  Psychiatric: Mood normal    Nursing note and vitals reviewed  Left foot x-ray viewed by me and independently reviewed by me contemporaneously as nondisplaced fracture distal fifth phalanx  Endy Pinzon PA-C

## 2020-12-02 ENCOUNTER — HOSPITAL ENCOUNTER (EMERGENCY)
Facility: HOSPITAL | Age: 55
Discharge: HOME | End: 2020-12-02
Attending: EMERGENCY MEDICINE | Admitting: EMERGENCY MEDICINE
Payer: COMMERCIAL

## 2020-12-02 ENCOUNTER — APPOINTMENT (EMERGENCY)
Dept: RADIOLOGY | Facility: HOSPITAL | Age: 55
End: 2020-12-02
Payer: COMMERCIAL

## 2020-12-02 ENCOUNTER — DOCUMENTATION (OUTPATIENT)
Dept: FAMILY MEDICINE CLINIC | Facility: HOME HEALTHCARE | Age: 55
End: 2020-12-02

## 2020-12-02 VITALS
HEART RATE: 63 BPM | WEIGHT: 105 LBS | TEMPERATURE: 97.9 F | DIASTOLIC BLOOD PRESSURE: 60 MMHG | BODY MASS INDEX: 19.32 KG/M2 | HEIGHT: 62 IN | RESPIRATION RATE: 20 BRPM | OXYGEN SATURATION: 95 % | SYSTOLIC BLOOD PRESSURE: 125 MMHG

## 2020-12-02 DIAGNOSIS — M54.9 MUSCULOSKELETAL BACK PAIN: Primary | ICD-10-CM

## 2020-12-02 LAB
ALBUMIN SERPL BCP-MCNC: 3.6 G/DL (ref 3.5–5)
ALP SERPL-CCNC: 50 U/L (ref 46–116)
ALT SERPL W P-5'-P-CCNC: 25 U/L (ref 12–78)
ANION GAP SERPL CALCULATED.3IONS-SCNC: 5 MMOL/L (ref 4–13)
APTT PPP: 32 SECONDS (ref 23–37)
AST SERPL W P-5'-P-CCNC: 18 U/L (ref 5–45)
BASOPHILS # BLD AUTO: 0.02 THOUSANDS/ΜL (ref 0–0.1)
BASOPHILS NFR BLD AUTO: 0 % (ref 0–1)
BILIRUB SERPL-MCNC: 0.3 MG/DL (ref 0.2–1)
BILIRUB UR QL STRIP: NEGATIVE
BUN SERPL-MCNC: 6 MG/DL (ref 5–25)
CALCIUM SERPL-MCNC: 9.3 MG/DL (ref 8.3–10.1)
CHLORIDE SERPL-SCNC: 101 MMOL/L (ref 100–108)
CLARITY UR: CLEAR
CO2 SERPL-SCNC: 31 MMOL/L (ref 21–32)
COLOR UR: YELLOW
CREAT SERPL-MCNC: 0.52 MG/DL (ref 0.6–1.3)
D DIMER PPP FEU-MCNC: 0.49 UG/ML FEU
EOSINOPHIL # BLD AUTO: 0.14 THOUSAND/ΜL (ref 0–0.61)
EOSINOPHIL NFR BLD AUTO: 2 % (ref 0–6)
ERYTHROCYTE [DISTWIDTH] IN BLOOD BY AUTOMATED COUNT: 12.4 % (ref 11.6–15.1)
GFR SERPL CREATININE-BSD FRML MDRD: 109 ML/MIN/1.73SQ M
GLUCOSE SERPL-MCNC: 83 MG/DL (ref 65–140)
GLUCOSE UR STRIP-MCNC: NEGATIVE MG/DL
HCT VFR BLD AUTO: 39.6 % (ref 34.8–46.1)
HGB BLD-MCNC: 13.3 G/DL (ref 11.5–15.4)
HGB UR QL STRIP.AUTO: NEGATIVE
IMM GRANULOCYTES # BLD AUTO: 0.01 THOUSAND/UL (ref 0–0.2)
IMM GRANULOCYTES NFR BLD AUTO: 0 % (ref 0–2)
INR PPP: 0.96 (ref 0.84–1.19)
KETONES UR STRIP-MCNC: NEGATIVE MG/DL
LEUKOCYTE ESTERASE UR QL STRIP: NEGATIVE
LYMPHOCYTES # BLD AUTO: 2.31 THOUSANDS/ΜL (ref 0.6–4.47)
LYMPHOCYTES NFR BLD AUTO: 26 % (ref 14–44)
MCH RBC QN AUTO: 31.3 PG (ref 26.8–34.3)
MCHC RBC AUTO-ENTMCNC: 33.6 G/DL (ref 31.4–37.4)
MCV RBC AUTO: 93 FL (ref 82–98)
MONOCYTES # BLD AUTO: 0.73 THOUSAND/ΜL (ref 0.17–1.22)
MONOCYTES NFR BLD AUTO: 8 % (ref 4–12)
NEUTROPHILS # BLD AUTO: 5.55 THOUSANDS/ΜL (ref 1.85–7.62)
NEUTS SEG NFR BLD AUTO: 64 % (ref 43–75)
NITRITE UR QL STRIP: NEGATIVE
NRBC BLD AUTO-RTO: 0 /100 WBCS
PH UR STRIP.AUTO: 7.5 [PH]
PLATELET # BLD AUTO: 293 THOUSANDS/UL (ref 149–390)
PMV BLD AUTO: 8.8 FL (ref 8.9–12.7)
POTASSIUM SERPL-SCNC: 4 MMOL/L (ref 3.5–5.3)
PROT SERPL-MCNC: 6.8 G/DL (ref 6.4–8.2)
PROT UR STRIP-MCNC: NEGATIVE MG/DL
PROTHROMBIN TIME: 12.6 SECONDS (ref 11.6–14.5)
RBC # BLD AUTO: 4.25 MILLION/UL (ref 3.81–5.12)
SODIUM SERPL-SCNC: 137 MMOL/L (ref 136–145)
SP GR UR STRIP.AUTO: 1.01 (ref 1–1.03)
TROPONIN I SERPL-MCNC: <0.02 NG/ML
UROBILINOGEN UR QL STRIP.AUTO: 0.2 E.U./DL
WBC # BLD AUTO: 8.76 THOUSAND/UL (ref 4.31–10.16)

## 2020-12-02 PROCEDURE — 85610 PROTHROMBIN TIME: CPT | Performed by: PHYSICIAN ASSISTANT

## 2020-12-02 PROCEDURE — 93005 ELECTROCARDIOGRAM TRACING: CPT

## 2020-12-02 PROCEDURE — 85730 THROMBOPLASTIN TIME PARTIAL: CPT | Performed by: PHYSICIAN ASSISTANT

## 2020-12-02 PROCEDURE — 85379 FIBRIN DEGRADATION QUANT: CPT | Performed by: PHYSICIAN ASSISTANT

## 2020-12-02 PROCEDURE — 99284 EMERGENCY DEPT VISIT MOD MDM: CPT

## 2020-12-02 PROCEDURE — 85025 COMPLETE CBC W/AUTO DIFF WBC: CPT | Performed by: PHYSICIAN ASSISTANT

## 2020-12-02 PROCEDURE — 99285 EMERGENCY DEPT VISIT HI MDM: CPT | Performed by: PHYSICIAN ASSISTANT

## 2020-12-02 PROCEDURE — 80053 COMPREHEN METABOLIC PANEL: CPT | Performed by: PHYSICIAN ASSISTANT

## 2020-12-02 PROCEDURE — 71101 X-RAY EXAM UNILAT RIBS/CHEST: CPT

## 2020-12-02 PROCEDURE — 81003 URINALYSIS AUTO W/O SCOPE: CPT | Performed by: PHYSICIAN ASSISTANT

## 2020-12-02 PROCEDURE — 84484 ASSAY OF TROPONIN QUANT: CPT | Performed by: PHYSICIAN ASSISTANT

## 2020-12-03 LAB
ATRIAL RATE: 64 BPM
P AXIS: -12 DEGREES
PR INTERVAL: 126 MS
QRS AXIS: 54 DEGREES
QRSD INTERVAL: 82 MS
QT INTERVAL: 438 MS
QTC INTERVAL: 451 MS
T WAVE AXIS: 52 DEGREES
VENTRICULAR RATE: 64 BPM

## 2020-12-03 PROCEDURE — 93010 ELECTROCARDIOGRAM REPORT: CPT | Performed by: INTERNAL MEDICINE

## 2020-12-07 ENCOUNTER — OFFICE VISIT (OUTPATIENT)
Dept: FAMILY MEDICINE CLINIC | Facility: HOME HEALTHCARE | Age: 55
End: 2020-12-07
Payer: COMMERCIAL

## 2020-12-07 VITALS
DIASTOLIC BLOOD PRESSURE: 70 MMHG | HEART RATE: 76 BPM | SYSTOLIC BLOOD PRESSURE: 118 MMHG | RESPIRATION RATE: 18 BRPM | BODY MASS INDEX: 20.43 KG/M2 | HEIGHT: 62 IN | WEIGHT: 111 LBS | TEMPERATURE: 97.6 F | OXYGEN SATURATION: 98 %

## 2020-12-07 DIAGNOSIS — Z13.820 OSTEOPOROSIS SCREENING: Primary | ICD-10-CM

## 2020-12-07 PROCEDURE — 99213 OFFICE O/P EST LOW 20 MIN: CPT | Performed by: NURSE PRACTITIONER

## 2021-01-20 ENCOUNTER — TELEPHONE (OUTPATIENT)
Dept: OTHER | Facility: OTHER | Age: 56
End: 2021-01-20

## 2021-01-21 ENCOUNTER — OFFICE VISIT (OUTPATIENT)
Dept: FAMILY MEDICINE CLINIC | Facility: CLINIC | Age: 56
End: 2021-01-21
Payer: COMMERCIAL

## 2021-01-21 ENCOUNTER — TELEPHONE (OUTPATIENT)
Dept: OBGYN CLINIC | Facility: CLINIC | Age: 56
End: 2021-01-21

## 2021-01-21 VITALS
DIASTOLIC BLOOD PRESSURE: 72 MMHG | RESPIRATION RATE: 18 BRPM | WEIGHT: 111 LBS | HEART RATE: 55 BPM | BODY MASS INDEX: 20.43 KG/M2 | OXYGEN SATURATION: 99 % | TEMPERATURE: 98.1 F | HEIGHT: 62 IN | SYSTOLIC BLOOD PRESSURE: 102 MMHG

## 2021-01-21 DIAGNOSIS — Z12.4 SCREENING FOR CERVICAL CANCER: ICD-10-CM

## 2021-01-21 DIAGNOSIS — M54.12 CERVICAL RADICULOPATHY: ICD-10-CM

## 2021-01-21 DIAGNOSIS — M25.511 ACUTE PAIN OF RIGHT SHOULDER: Primary | ICD-10-CM

## 2021-01-21 PROCEDURE — 99213 OFFICE O/P EST LOW 20 MIN: CPT | Performed by: FAMILY MEDICINE

## 2021-01-21 RX ORDER — GABAPENTIN 300 MG/1
300 CAPSULE ORAL 2 TIMES DAILY
Qty: 180 CAPSULE | Refills: 0 | Status: SHIPPED | OUTPATIENT
Start: 2021-01-21 | End: 2021-04-12

## 2021-01-21 NOTE — TELEPHONE ENCOUNTER
Scheduled appt for [address] [facility]  office and pt wanted Lily Boykin  [facility]

## 2021-01-21 NOTE — PROGRESS NOTES
Assessment/Plan:     Diagnoses and all orders for this visit:    Acute pain of right shoulder  -     Ambulatory referral to Physical Therapy; Future  -     Ambulatory referral to Orthopedic Surgery; Future    Cervical radiculopathy  -     gabapentin (NEURONTIN) 300 mg capsule; Take 1 capsule (300 mg total) by mouth 2 (two) times a day    Screening for cervical cancer  -     Ambulatory referral to Gynecology; Future        - Continue current medications and follow up with pain management as scheduled  - Will refer to PT and ortho for further evaluation of right shoulder pain  I believe she would benefit from stretching and strengthening exercises  Advised she may apply OTC topical products such as icy hot or bengay for additional relief  Return for Annual physical        Subjective:        Patient ID: Margarita Dover is a 50 y o  female  Chief Complaint   Patient presents with   Patricia Esquivel is a 50year old right handed female with history of chronic back and neck pain and fibromyalgia, presenting with concern for right shoulder pain  Patient reports a 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, requiring arthroscopy in the past   Over the last month she has noted worsening pain at night  States that it does not bother her during the day, however, she has been waking up with pain at night, despite taking 600 mg gabapentin and 600 mg ibuprofen before bed  She denies any inciting event or injury  Patient currently follows with pain management in [facility] whom she has been seeing for about 10 years  She is currently managed with gabapentin 300 mg BID, Cymbalta 30 mg daily, tizanidine 2 mg prn, and meloxicam 15 mg daily for chronic neck and back pain  Patient states that this provider has tolder her that she has "knots" in her shoulders and may benefit from massage therapy, however, patient has not done this as it is not covered by insurance    Reports that she saw Dr Venkatesh Christopher for her shoulder in the past and states she was told there was nothing they could do about her pain at that time        The following portions of the patient's history were reviewed and updated as appropriate: allergies, current medications, past family history, past medical history, past social history, past surgical history and problem list     Patient Active Problem List   Diagnosis    Allergic rhinitis    Arthritis    Cervical radiculopathy    Neck pain    Chronic pain of right ankle    Depression    Esophageal reflux    Fatigue    Fibromyalgia    Herniated lumbar intervertebral disc    Hypercholesteremia    Hypocalcemia    Hyponatremia    Narcolepsy    Spondylosis of lumbosacral region without myelopathy or radiculopathy    Sinus bradycardia    Primary localized osteoarthrosis of the hip    Vitamin D deficiency       Current Outpatient Medications   Medication Sig Dispense Refill    Calcium Carbonate-Vitamin D (CALCIUM 500 + D PO) Take 1 tablet by mouth daily      DULoxetine (CYMBALTA) 30 mg delayed release capsule Take 1 capsule (30 mg total) by mouth daily 90 capsule 2    gabapentin (NEURONTIN) 300 mg capsule Take 1 capsule (300 mg total) by mouth 2 (two) times a day 180 capsule 0    GLUCOSAMINE-CHONDROITIN PO Take 2 caplets by mouth daily      lidocaine (LMX) 4 % cream Apply topically as needed for mild pain 30 g 0    meloxicam (MOBIC) 15 mg tablet Take 1 tablet (15 mg total) by mouth daily 90 tablet 2    multivitamin (THERAGRAN) TABS Take 1 tablet by mouth daily      polyethylene glycol (GLYCOLAX) powder Take 17 g by mouth 2 (two) times a day (Patient taking differently: Take 17 g by mouth 2 (two) times a day as needed ) 578 g 3    tiZANidine (ZANAFLEX) 2 mg tablet Take 2 mg by mouth as needed for muscle spasms       ketoconazole (NIZORAL) 2 % cream APPLY TWICE A DAY FOR 2 WEEKS AS NEEDED FOR ITCHING       No current facility-administered medications for this visit  Past Medical History:   Diagnosis Date    Anxiety     Back problem     Shoulder pain         Past Surgical History:   Procedure Laterality Date    BREAST SURGERY      COLONOSCOPY      DILATION AND CURETTAGE OF UTERUS      LEG SURGERY Left     LEG SURGERY      SHOULDER ARTHROSCOPY Right     shoulder        Social History     Socioeconomic History    Marital status: Single     Spouse name: Not on file    Number of children: Not on file    Years of education: Not on file    Highest education level: Not on file   Occupational History    Not on file   Social Needs    Financial resource strain: Not on file    Food insecurity     Worry: Not on file     Inability: Not on file    Transportation needs     Medical: Not on file     Non-medical: Not on file   Tobacco Use    Smoking status: Former Smoker     Types: Cigarettes     Quit date: 1991     Years since quittin 0    Smokeless tobacco: Never Used   Substance and Sexual Activity    Alcohol use:  Yes     Alcohol/week: 7 0 standard drinks     Types: 7 Glasses of wine per week     Comment: glass of wine nightly    Drug use: No    Sexual activity: Not Currently     Partners: Male   Lifestyle    Physical activity     Days per week: Not on file     Minutes per session: Not on file    Stress: Not on file   Relationships    Social connections     Talks on phone: Not on file     Gets together: Not on file     Attends Cheondoism service: Not on file     Active member of club or organization: Not on file     Attends meetings of clubs or organizations: Not on file     Relationship status: Not on file    Intimate partner violence     Fear of current or ex partner: Not on file     Emotionally abused: Not on file     Physically abused: Not on file     Forced sexual activity: Not on file   Other Topics Concern    Not on file   Social History Narrative    Not on file        Review of Systems   Constitutional: Negative for chills, diaphoresis and fever  Respiratory: Negative for cough, chest tightness, shortness of breath and wheezing  Cardiovascular: Negative for chest pain, palpitations and leg swelling  Gastrointestinal: Negative for abdominal pain, blood in stool, constipation, diarrhea, nausea and vomiting  Musculoskeletal: Positive for arthralgias, myalgias and neck pain  Skin: Negative for rash and wound  Neurological: Negative for dizziness, syncope, weakness, light-headedness and headaches  Objective:      /72 (BP Location: Left arm, Patient Position: Sitting, Cuff Size: Adult)   Pulse 55   Temp 98 1 °F (36 7 °C) (Tympanic)   Resp 18   Ht 5' 2" (1 575 m)   Wt 50 3 kg (111 lb)   SpO2 99%   BMI 20 30 kg/m²          Physical Exam  Constitutional:       General: She is not in acute distress  Appearance: Normal appearance  HENT:      Head: Normocephalic and atraumatic  Cardiovascular:      Rate and Rhythm: Normal rate and regular rhythm  Heart sounds: Normal heart sounds  No murmur  Pulmonary:      Effort: Pulmonary effort is normal  No respiratory distress  Breath sounds: Normal breath sounds  No wheezing  Musculoskeletal: Normal range of motion  General: No swelling, tenderness or deformity  Comments: Right shoulder with full ROM intact  No point tenderness to palpation  Skin:     General: Skin is warm and dry  Neurological:      General: No focal deficit present  Mental Status: She is alert and oriented to person, place, and time  Cranial Nerves: No cranial nerve deficit     Psychiatric:         Mood and Affect: Mood normal          Behavior: Behavior normal

## 2021-01-24 DIAGNOSIS — M19.90 ARTHRITIS: ICD-10-CM

## 2021-01-25 RX ORDER — MELOXICAM 15 MG/1
TABLET ORAL
Qty: 90 TABLET | Refills: 2 | Status: SHIPPED | OUTPATIENT
Start: 2021-01-25 | End: 2021-10-29 | Stop reason: SDUPTHER

## 2021-01-28 ENCOUNTER — OFFICE VISIT (OUTPATIENT)
Dept: OBGYN CLINIC | Facility: CLINIC | Age: 56
End: 2021-01-28
Payer: COMMERCIAL

## 2021-01-28 ENCOUNTER — APPOINTMENT (OUTPATIENT)
Dept: RADIOLOGY | Facility: CLINIC | Age: 56
End: 2021-01-28
Payer: COMMERCIAL

## 2021-01-28 VITALS
SYSTOLIC BLOOD PRESSURE: 119 MMHG | HEART RATE: 70 BPM | BODY MASS INDEX: 20.06 KG/M2 | HEIGHT: 62 IN | WEIGHT: 109 LBS | DIASTOLIC BLOOD PRESSURE: 80 MMHG

## 2021-01-28 DIAGNOSIS — M25.511 CHRONIC RIGHT SHOULDER PAIN: Primary | ICD-10-CM

## 2021-01-28 DIAGNOSIS — G89.29 CHRONIC RIGHT SHOULDER PAIN: ICD-10-CM

## 2021-01-28 DIAGNOSIS — M25.511 CHRONIC RIGHT SHOULDER PAIN: ICD-10-CM

## 2021-01-28 DIAGNOSIS — Z98.890 HISTORY OF ARTHROSCOPIC SURGERY OF SHOULDER: ICD-10-CM

## 2021-01-28 DIAGNOSIS — G89.29 CHRONIC RIGHT SHOULDER PAIN: Primary | ICD-10-CM

## 2021-01-28 DIAGNOSIS — M19.011 OSTEOARTHRITIS OF GLENOHUMERAL JOINT, RIGHT: ICD-10-CM

## 2021-01-28 PROCEDURE — 20610 DRAIN/INJ JOINT/BURSA W/O US: CPT | Performed by: ORTHOPAEDIC SURGERY

## 2021-01-28 PROCEDURE — 1036F TOBACCO NON-USER: CPT | Performed by: ORTHOPAEDIC SURGERY

## 2021-01-28 PROCEDURE — 99214 OFFICE O/P EST MOD 30 MIN: CPT | Performed by: ORTHOPAEDIC SURGERY

## 2021-01-28 PROCEDURE — 73030 X-RAY EXAM OF SHOULDER: CPT

## 2021-01-28 PROCEDURE — 3008F BODY MASS INDEX DOCD: CPT | Performed by: ORTHOPAEDIC SURGERY

## 2021-01-28 RX ORDER — BETAMETHASONE SODIUM PHOSPHATE AND BETAMETHASONE ACETATE 3; 3 MG/ML; MG/ML
6 INJECTION, SUSPENSION INTRA-ARTICULAR; INTRALESIONAL; INTRAMUSCULAR; SOFT TISSUE
Status: COMPLETED | OUTPATIENT
Start: 2021-01-28 | End: 2021-01-28

## 2021-01-28 RX ORDER — LIDOCAINE HYDROCHLORIDE 10 MG/ML
5 INJECTION, SOLUTION INFILTRATION; PERINEURAL
Status: COMPLETED | OUTPATIENT
Start: 2021-01-28 | End: 2021-01-28

## 2021-01-28 RX ADMIN — LIDOCAINE HYDROCHLORIDE 5 ML: 10 INJECTION, SOLUTION INFILTRATION; PERINEURAL at 11:27

## 2021-01-28 RX ADMIN — BETAMETHASONE SODIUM PHOSPHATE AND BETAMETHASONE ACETATE 6 MG: 3; 3 INJECTION, SUSPENSION INTRA-ARTICULAR; INTRALESIONAL; INTRAMUSCULAR; SOFT TISSUE at 11:27

## 2021-01-28 NOTE — PROGRESS NOTES
Assessment:     1  Chronic right shoulder pain    2  History of arthroscopic surgery of shoulder    3  Osteoarthritis of glenohumeral joint, right          Plan:   Diagnoses and all orders for this visit:    Chronic right shoulder pain  -     Ambulatory referral to Orthopedic Surgery  -     XR shoulder 2+ vw right; Future    History of arthroscopic surgery of shoulder    Osteoarthritis of glenohumeral joint, right  -     Large joint arthrocentesis         X-rays performed today in clinic were reviewed with the patient  Treatment options were discussed  The patient was offered a right shoulder glenohumeral injection which she accepted today  This was performed without difficulty by Dr Jerome Melissa  She has no restrictions following this injection  A she was advised that physical therapy would likely aggravate her symptoms rather than alleviates some  We will see her back on an as-needed basis  As we were walking out of the room, she did ask if she could see her pain management doctor for future glenohumeral injections  She was informed that this is okay with us as long as her pain management physician is comfortable doing them  Otherwise, she may contact us in 3-4 months for repeat injection  This patient was evaluated both by myself and Dr Jerome Melissa  Patient ID: Abebe Reed is a 50 y o  female  Chief Complaint:  Right shoulder pain    HPI:  50 y o  female presents today for evaluation of right shoulder pain  The patient is RHD  She reports a long standing history of shoulder pain that seems to have worsened over the past several months  She underwent right shoulder subacromial decompression and biceps tenodesis in 2012 with Dr Ronda Crawford  She states she always had some stiffness and aching in the shoulder since the surgery  She did try a CSI in the right shoulder in 2018, performed by Karen Haq but denies any significant benefit from this injection   Since 2018, she reports she has been "living with the pain " She states several months ago, she began experiencing worsening pain in the right shoulder without any known cause  She describes the pain as sharp, only occurring at night  She denies any significant pain during the day  Richard Lainez also reports a history of cervical DDD and see a spine and pain specialist in [facility]  She has done several bouts of PT for her neck and routinely gets injections in her cervical spine  She reports her PCP did refer to her PT for her shoulder but she wanted to get our opinion prior to starting  Allergy:  Allergies   Allergen Reactions    Amitriptyline      Other reaction(s): Constipation, Dry mouth       Medications:  all current active meds have been reviewed    Past Medical History:  Past Medical History:   Diagnosis Date    Anxiety     Back problem     Shoulder pain        Past Surgical History:  Past Surgical History:   Procedure Laterality Date    BREAST SURGERY      COLONOSCOPY      DILATION AND CURETTAGE OF UTERUS      LEG SURGERY Left     LEG SURGERY      SHOULDER ARTHROSCOPY Right     shoulder       Family History:  Family History   Problem Relation Age of Onset    Breast cancer Paternal Grandmother     Colon cancer Paternal Grandmother     Arthritis Family     Diabetes Family     Hypertension Family     Osteoporosis Family     Ovarian cancer Family     Uterine cancer Family        Social History:  Social History     Substance and Sexual Activity   Alcohol Use Yes    Alcohol/week: 7 0 standard drinks    Types: 7 Glasses of wine per week    Comment: glass of wine nightly     Social History     Substance and Sexual Activity   Drug Use No     Social History     Tobacco Use   Smoking Status Former Smoker    Types: Cigarettes    Quit date: 1991    Years since quittin 0   Smokeless Tobacco Never Used           ROS:  Review of Systems   Constitutional: Negative for chills, fever and unexpected weight change     HENT: Negative for hearing loss, nosebleeds and sore throat  Eyes: Negative for pain, redness and visual disturbance  Respiratory: Negative for cough, shortness of breath and wheezing  Cardiovascular: Negative for chest pain, palpitations and leg swelling  Gastrointestinal: Negative for abdominal pain, nausea and vomiting  Endocrine: Negative for polydipsia and polyuria  Genitourinary: Negative for dysuria and hematuria  Musculoskeletal: Positive for arthralgias, myalgias, neck pain and neck stiffness  Negative for joint swelling  Skin: Negative for rash and wound  Neurological: Negative for dizziness, numbness and headaches  Psychiatric/Behavioral: Negative for decreased concentration and suicidal ideas  The patient is not nervous/anxious  Objective:  BP Readings from Last 1 Encounters:   01/28/21 119/80      Wt Readings from Last 1 Encounters:   01/28/21 49 4 kg (109 lb)        BMI:   Estimated body mass index is 19 94 kg/m² as calculated from the following:    Height as of this encounter: 5' 2" (1 575 m)  Weight as of this encounter: 49 4 kg (109 lb)  EXAM:   Physical Exam  Vitals signs reviewed  Constitutional:       Appearance: She is well-developed  HENT:      Head: Normocephalic and atraumatic  Eyes:      Pupils: Pupils are equal, round, and reactive to light  Neck:      Musculoskeletal: Normal range of motion  Cardiovascular:      Rate and Rhythm: Normal rate and regular rhythm  Pulmonary:      Effort: Pulmonary effort is normal       Breath sounds: Normal breath sounds  Abdominal:      Palpations: Abdomen is soft  Skin:     General: Skin is warm and dry  Neurological:      Mental Status: She is alert and oriented to person, place, and time  Psychiatric:         Behavior: Behavior normal        Right Shoulder Exam     Tenderness   The patient is experiencing no tenderness      Range of Motion   Active abduction: 170   Passive abduction: normal   External rotation: 60   Forward flexion: 160   Internal rotation 0 degrees: Lumbar   Internal rotation 90 degrees: 90     Muscle Strength   Abduction: 5/5   Internal rotation: 4/5   External rotation: 4/5   Supraspinatus: 5/5     Tests   Arvizu test: negative  Cross arm: negative  Drop arm: negative    Other   Erythema: absent  Scars: present  Sensation: normal  Pulse: present      Left Shoulder Exam   Left shoulder exam is normal                 Radiographs:  I have personally reviewed pertinent films in PACS  XRs performed today in clinic demonstrate severe glenohumeral osteoarthritis  Button from biceps tenodesis is visible  Large joint arthrocentesis: R glenohumeral  Universal Protocol:  Consent given by: patient  Time out: Immediately prior to procedure a "time out" was called to verify the correct patient, procedure, equipment, support staff and site/side marked as required    Site marked: the operative site was marked  Supporting Documentation  Indications: pain   Procedure Details  Location: shoulder - R glenohumeral  Preparation: Patient was prepped and draped in the usual sterile fashion  Needle size: 22 G  Ultrasound guidance: no  Approach: posterolateral  Medications administered: 5 mL lidocaine 1 %; 6 mg betamethasone acetate-betamethasone sodium phosphate 6 (3-3) mg/mL    Patient tolerance: patient tolerated the procedure well with no immediate complications  Dressing:  Sterile dressing applied

## 2021-02-10 DIAGNOSIS — Z12.31 BREAST CANCER SCREENING BY MAMMOGRAM: Primary | ICD-10-CM

## 2021-02-17 DIAGNOSIS — Z12.4 SCREENING FOR CERVICAL CANCER: Primary | ICD-10-CM

## 2021-02-19 ENCOUNTER — EVALUATION (OUTPATIENT)
Dept: PHYSICAL THERAPY | Facility: HOME HEALTHCARE | Age: 56
End: 2021-02-19

## 2021-03-10 DIAGNOSIS — Z23 ENCOUNTER FOR IMMUNIZATION: ICD-10-CM

## 2021-03-22 ENCOUNTER — HOSPITAL ENCOUNTER (OUTPATIENT)
Dept: MAMMOGRAPHY | Facility: HOSPITAL | Age: 56
Discharge: HOME | End: 2021-03-22
Payer: COMMERCIAL

## 2021-03-22 VITALS — BODY MASS INDEX: 20.06 KG/M2 | HEIGHT: 62 IN | WEIGHT: 109 LBS

## 2021-03-22 DIAGNOSIS — Z12.31 BREAST CANCER SCREENING BY MAMMOGRAM: ICD-10-CM

## 2021-03-22 PROCEDURE — 77063 BREAST TOMOSYNTHESIS BI: CPT

## 2021-03-22 PROCEDURE — 77067 SCR MAMMO BI INCL CAD: CPT

## 2021-03-25 ENCOUNTER — TELEPHONE (OUTPATIENT)
Dept: FAMILY MEDICINE CLINIC | Facility: HOME HEALTHCARE | Age: 56
End: 2021-03-25

## 2021-03-25 NOTE — TELEPHONE ENCOUNTER
Patient called stating that she has multiple skin tags and would like a referral to Dr Jackelin Carpenter - general surgery to have them removed   Please advise

## 2021-03-26 ENCOUNTER — IMMUNIZATIONS (OUTPATIENT)
Dept: FAMILY MEDICINE CLINIC | Facility: HOSPITAL | Age: 56
End: 2021-03-26

## 2021-03-26 DIAGNOSIS — L91.8 MULTIPLE ACQUIRED SKIN TAGS: Primary | ICD-10-CM

## 2021-03-26 DIAGNOSIS — Z23 ENCOUNTER FOR IMMUNIZATION: Primary | ICD-10-CM

## 2021-03-26 PROCEDURE — 91301 SARS-COV-2 / COVID-19 MRNA VACCINE (MODERNA) 100 MCG: CPT

## 2021-03-26 PROCEDURE — 0011A SARS-COV-2 / COVID-19 MRNA VACCINE (MODERNA) 100 MCG: CPT

## 2021-03-30 ENCOUNTER — CONSULT (OUTPATIENT)
Dept: SURGERY | Facility: CLINIC | Age: 56
End: 2021-03-30
Payer: COMMERCIAL

## 2021-03-30 VITALS
SYSTOLIC BLOOD PRESSURE: 115 MMHG | TEMPERATURE: 97 F | HEART RATE: 64 BPM | DIASTOLIC BLOOD PRESSURE: 73 MMHG | WEIGHT: 107.4 LBS | BODY MASS INDEX: 19.77 KG/M2 | HEIGHT: 62 IN

## 2021-03-30 DIAGNOSIS — L91.8 MULTIPLE ACQUIRED SKIN TAGS: ICD-10-CM

## 2021-03-30 PROCEDURE — 99202 OFFICE O/P NEW SF 15 MIN: CPT | Performed by: SURGERY

## 2021-03-30 NOTE — PROGRESS NOTES
Assessment/Plan:    Multiple acquired skin tags   Skin tag of left temporal region which has since spontaneously fallen off  Patient states that the turn black and suddenly was not there anymore  Patient has had other skin tags and things removed in the past and therefore wound things looked black she felt that she should be seen  On exam there is evidence of previous scab from where this is likely skin tag was  No bleeding no erythema  No evidence of infection  For now I told patient to worry unlikely this will return  If she should see anything that worries her once again she should contact my office follow-up       Diagnoses and all orders for this visit:    Multiple acquired skin tags  -     Ambulatory referral to General Surgery          Subjective:      Patient ID: Syeda Bearden is a 50 y o  female  53-year-old female with a history arthritis, fibromyalgia, presents to the office today for evaluation of a likely skin tag of the left temple region  Patient states she had this skin tag which was a few cm in length some time  Recently turned black worried her  She sought evaluation from surgery and therefore was made appoint my office  Unfortunately since that time the skin tag itself is fallen off  She saw a small black dot in the vicinity of where this was but that has since been  No fevers or chills  No nausea vomiting  Patient denies any redness or drainage from the area  The following portions of the patient's history were reviewed and updated as appropriate:   She  has a past medical history of Anxiety, Back problem, and Shoulder pain    She   Patient Active Problem List    Diagnosis Date Noted    Multiple acquired skin tags 03/30/2021    Hypercholesteremia 08/11/2017    Chronic pain of right ankle 04/04/2017    Allergic rhinitis 01/07/2016    Fatigue 06/03/2014    Vitamin D deficiency 06/03/2014    Hypocalcemia 12/03/2013    Hyponatremia 12/03/2013    Spondylosis of lumbosacral region without myelopathy or radiculopathy 12/03/2013    Sinus bradycardia 12/03/2013    Herniated lumbar intervertebral disc 09/25/2013    Primary localized osteoarthrosis of the hip 09/25/2013    Narcolepsy 09/03/2013    Arthritis 11/06/2012    Fibromyalgia 07/03/2012    Cervical radiculopathy 06/19/2012    Depression 06/14/2012    Esophageal reflux 06/14/2012    Neck pain 04/26/2011     She  has a past surgical history that includes Leg Surgery (Left); Dilation and curettage of uterus; Leg Surgery; Shoulder arthroscopy (Right); Colonoscopy; Breast biopsy (Right, 2005); and Endometrial ablation  Her family history includes Arthritis in her family; Breast cancer (age of onset: 79) in her paternal grandmother; Colon cancer in her paternal grandmother; Diabetes in her family and paternal aunt; Heart disease in her father and paternal grandfather; Hypertension in her family; No Known Problems in her mother and paternal aunt; Osteoporosis in her family; Ovarian cancer in her family; Uterine cancer in her family  She  reports that she quit smoking about 30 years ago  Her smoking use included cigarettes  She has never used smokeless tobacco  She reports current alcohol use of about 7 0 standard drinks of alcohol per week  She reports that she does not use drugs    Current Outpatient Medications   Medication Sig Dispense Refill    Calcium Carbonate-Vitamin D (CALCIUM 500 + D PO) Take 1 tablet by mouth daily      DULoxetine (CYMBALTA) 30 mg delayed release capsule Take 1 capsule (30 mg total) by mouth daily 90 capsule 2    gabapentin (NEURONTIN) 300 mg capsule Take 1 capsule (300 mg total) by mouth 2 (two) times a day 180 capsule 0    GLUCOSAMINE-CHONDROITIN PO Take 2 caplets by mouth daily      ketoconazole (NIZORAL) 2 % cream APPLY TWICE A DAY FOR 2 WEEKS AS NEEDED FOR ITCHING      lidocaine (LMX) 4 % cream Apply topically as needed for mild pain 30 g 0    meloxicam (MOBIC) 15 mg tablet take 1 tablet by mouth once daily 90 tablet 2    multivitamin (THERAGRAN) TABS Take 1 tablet by mouth daily      polyethylene glycol (GLYCOLAX) powder Take 17 g by mouth 2 (two) times a day (Patient taking differently: Take 17 g by mouth 2 (two) times a day as needed ) 578 g 3    tiZANidine (ZANAFLEX) 2 mg tablet Take 2 mg by mouth as needed for muscle spasms        No current facility-administered medications for this visit  She is allergic to amitriptyline       Review of Systems   Constitutional: Negative for activity change, appetite change, chills, diaphoresis, fatigue, fever and unexpected weight change  Skin: Positive for wound ( left temporal)  Negative for color change, pallor and rash  Objective:      /73 (BP Location: Left arm, Patient Position: Sitting, Cuff Size: Extra-Large)   Pulse 64   Temp (!) 97 °F (36 1 °C) (Tympanic)   Ht 5' 2" (1 575 m)   Wt 48 7 kg (107 lb 6 4 oz)   BMI 19 64 kg/m²          Physical Exam  Vitals signs reviewed  Constitutional:       General: She is not in acute distress  Appearance: Normal appearance  She is normal weight  She is not ill-appearing, toxic-appearing or diaphoretic  HENT:      Head: Normocephalic and atraumatic  Right Ear: External ear normal       Left Ear: External ear normal    Eyes:      General: No scleral icterus  Right eye: No discharge  Left eye: No discharge  Cardiovascular:      Rate and Rhythm: Normal rate  Pulmonary:      Effort: Pulmonary effort is normal  No respiratory distress  Skin:     General: Skin is warm and dry  Coloration: Skin is not jaundiced or pale  Findings: No bruising, erythema, lesion or rash  Comments: In the left temporal region there is evidence of a small scab which was likely the site of the previous skin tag which had spontaneously follow-up  No erythema or active drainage  Nontender  Neurological:      General: No focal deficit present        Mental Status: She is alert and oriented to person, place, and time  Cranial Nerves: No cranial nerve deficit  Psychiatric:         Mood and Affect: Mood normal          Behavior: Behavior normal          Thought Content:  Thought content normal          Judgment: Judgment normal

## 2021-03-30 NOTE — ASSESSMENT & PLAN NOTE
Skin tag of left temporal region which has since spontaneously fallen off  Patient states that the turn black and suddenly was not there anymore  Patient has had other skin tags and things removed in the past and therefore wound things looked black she felt that she should be seen  On exam there is evidence of previous scab from where this is likely skin tag was  No bleeding no erythema  No evidence of infection  For now I told patient to worry unlikely this will return    If she should see anything that worries her once again she should contact my office follow-up

## 2021-04-01 ENCOUNTER — TELEPHONE (OUTPATIENT)
Dept: OBGYN CLINIC | Facility: CLINIC | Age: 56
End: 2021-04-01

## 2021-04-01 ENCOUNTER — OFFICE VISIT (OUTPATIENT)
Dept: FAMILY MEDICINE CLINIC | Facility: HOME HEALTHCARE | Age: 56
End: 2021-04-01
Payer: COMMERCIAL

## 2021-04-01 VITALS
BODY MASS INDEX: 19.98 KG/M2 | SYSTOLIC BLOOD PRESSURE: 118 MMHG | WEIGHT: 108.6 LBS | TEMPERATURE: 96.9 F | HEART RATE: 62 BPM | DIASTOLIC BLOOD PRESSURE: 76 MMHG | OXYGEN SATURATION: 100 % | HEIGHT: 62 IN

## 2021-04-01 DIAGNOSIS — G89.29 CHRONIC RIGHT SHOULDER PAIN: Primary | ICD-10-CM

## 2021-04-01 DIAGNOSIS — M25.50 ARTHRALGIA, UNSPECIFIED JOINT: ICD-10-CM

## 2021-04-01 DIAGNOSIS — M25.511 CHRONIC RIGHT SHOULDER PAIN: Primary | ICD-10-CM

## 2021-04-01 PROCEDURE — 3008F BODY MASS INDEX DOCD: CPT | Performed by: ORTHOPAEDIC SURGERY

## 2021-04-01 PROCEDURE — 99213 OFFICE O/P EST LOW 20 MIN: CPT | Performed by: FAMILY MEDICINE

## 2021-04-01 NOTE — PROGRESS NOTES
Assessment/Plan:    Hansa Monroy is a 50year old male with PMhx of fibromyalga, chronic right shoulder pain, herniated lumbar disc, cervical radiculopathy presents today c/o right shoulder pain  Patient is interested in following with Dr Brent Oliva  Referral made to orthopedics  Ordered CRP, RF, ESR as there are involvement of multiple joint pain r/o RA  Patient is on Neurontin and Cymbalta for neuropathic pain management  Positive left Finkelstein test DDx [facility] Bruce Tenosynovitis advised patient to wear thumb spica splint for immobilization and OTC NSAIDs  F/u in 6 months  Case d/w Dr Destiny Sancehz  Diagnoses and all orders for this visit:    Chronic right shoulder pain  -     Ambulatory referral to Orthopedic Surgery; Future    Arthralgia, unspecified joint  -     C-reactive protein; Future  -     RF Screen w/ Reflex to Titer; Future  -     Sedimentation rate, automated; Future        Subjective:      Patient ID: Hansa Monroy is a 50 y o  female  HPI     Patient is a 50year old male with PMhx of fibromyalga, chronic right shoulder pain, herniated lumbar disc, cervical radiculopathy presents today c/o right shoulder pain  Patient currently follows with orthopedics  She has history of right shoulder subacromial decompression and biceps tenodesis in 2012 with Dr Brent Oliva, last received right shoulder glenohumeral injection by Dr David Ellsworth in 1/28/21  She also see pain management received trigger point injection at her back  Reports some relief after shoulder injection  She is interested in going back to see Dr Brent Oliva  Pain is worse when lying on her right side while sleeping  X-ray of right shoulder in January 2021 showed severe degenerative changes in glenohumeral joint  Patient is currently on Neurontin taking 600 mg qHS and Motrin 600 mg qHS  She also takes Mobic 15 mg PO daily  Patient also reports bilateral elbow pain  Denied any morning stiffness, joint swelling  Endorse left thumb pain at times  The following portions of the patient's history were reviewed and updated as appropriate: allergies, current medications, past family history, past medical history, past social history, past surgical history and problem list     Review of Systems   Constitutional: Negative for chills and fever  HENT: Negative for congestion, rhinorrhea and sore throat  Eyes: Negative for visual disturbance  Respiratory: Negative for cough and shortness of breath  Cardiovascular: Negative for chest pain and palpitations  Gastrointestinal: Negative for abdominal pain, nausea and vomiting  Genitourinary: Negative for dysuria  Musculoskeletal: Positive for arthralgias  Negative for back pain and joint swelling  Skin: Negative for color change  Neurological: Negative for dizziness and headaches  Hematological: Does not bruise/bleed easily  Psychiatric/Behavioral: Negative for confusion  Objective:      /76   Pulse 62   Temp (!) 96 9 °F (36 1 °C) (Tympanic)   Ht 5' 2" (1 575 m)   Wt 49 3 kg (108 lb 9 6 oz)   SpO2 100%   BMI 19 86 kg/m²          Physical Exam  Vitals signs and nursing note reviewed  Constitutional:       General: She is not in acute distress  Appearance: She is well-developed  HENT:      Head: Normocephalic  Mouth/Throat:      Pharynx: No oropharyngeal exudate  Eyes:      General: No scleral icterus  Right eye: No discharge  Left eye: No discharge  Conjunctiva/sclera: Conjunctivae normal    Neck:      Musculoskeletal: Normal range of motion  Cardiovascular:      Rate and Rhythm: Normal rate and regular rhythm  Heart sounds: Normal heart sounds  No murmur  Pulmonary:      Effort: Pulmonary effort is normal  No respiratory distress  Breath sounds: Normal breath sounds  No wheezing  Abdominal:      General: Bowel sounds are normal  There is no distension  Palpations: Abdomen is soft  Tenderness:  There is no abdominal tenderness  Musculoskeletal:         General: No tenderness  Right lower leg: No edema  Left lower leg: No edema  Comments: Right shoulder exam: pain elicited on active abduction at 150 degree  Neer's test negative, Hawkin's test negative, Empty can test negative, Lift off test negative  4/5 on external rotation    Left shoulder exam: normal passive and active ROM  Negative Neers, Hawkin's, Empty-can, Lift off tests    Negative Cozen's test bilaterally    Left hand: Finkelstein test elicited pain   Lymphadenopathy:      Cervical: No cervical adenopathy  Skin:     Findings: No erythema  Neurological:      Mental Status: She is alert and oriented to person, place, and time     Psychiatric:         Behavior: Behavior normal

## 2021-04-01 NOTE — TELEPHONE ENCOUNTER
Left message with  Chloe Agarwal to have pt return call to schedule appt from pcp referral  Schedule for [facility]

## 2021-04-02 DIAGNOSIS — M25.50 ARTHRALGIA, UNSPECIFIED JOINT: ICD-10-CM

## 2021-04-02 LAB
CRP SERPL QL: <3 MG/L
ERYTHROCYTE [SEDIMENTATION RATE] IN BLOOD: 12 MM/HOUR (ref 0–29)

## 2021-04-02 PROCEDURE — 86140 C-REACTIVE PROTEIN: CPT | Performed by: STUDENT IN AN ORGANIZED HEALTH CARE EDUCATION/TRAINING PROGRAM

## 2021-04-02 PROCEDURE — 85652 RBC SED RATE AUTOMATED: CPT | Performed by: STUDENT IN AN ORGANIZED HEALTH CARE EDUCATION/TRAINING PROGRAM

## 2021-04-02 PROCEDURE — 86430 RHEUMATOID FACTOR TEST QUAL: CPT | Performed by: STUDENT IN AN ORGANIZED HEALTH CARE EDUCATION/TRAINING PROGRAM

## 2021-04-05 LAB — RHEUMATOID FACT SER QL LA: NEGATIVE

## 2021-04-07 ENCOUNTER — OFFICE VISIT (OUTPATIENT)
Dept: OBGYN CLINIC | Facility: CLINIC | Age: 56
End: 2021-04-07
Payer: COMMERCIAL

## 2021-04-07 ENCOUNTER — VBI (OUTPATIENT)
Dept: ADMINISTRATIVE | Facility: OTHER | Age: 56
End: 2021-04-07

## 2021-04-07 ENCOUNTER — APPOINTMENT (OUTPATIENT)
Dept: RADIOLOGY | Facility: MEDICAL CENTER | Age: 56
End: 2021-04-07
Payer: COMMERCIAL

## 2021-04-07 VITALS
DIASTOLIC BLOOD PRESSURE: 67 MMHG | HEIGHT: 62 IN | HEART RATE: 86 BPM | SYSTOLIC BLOOD PRESSURE: 126 MMHG | WEIGHT: 108 LBS | BODY MASS INDEX: 19.88 KG/M2

## 2021-04-07 DIAGNOSIS — G89.29 CHRONIC RIGHT SHOULDER PAIN: ICD-10-CM

## 2021-04-07 DIAGNOSIS — M19.011 ARTHRITIS OF RIGHT SHOULDER REGION: ICD-10-CM

## 2021-04-07 DIAGNOSIS — M25.511 RIGHT SHOULDER PAIN, UNSPECIFIED CHRONICITY: ICD-10-CM

## 2021-04-07 DIAGNOSIS — M25.511 CHRONIC RIGHT SHOULDER PAIN: ICD-10-CM

## 2021-04-07 DIAGNOSIS — M50.30 DDD (DEGENERATIVE DISC DISEASE), CERVICAL: Primary | ICD-10-CM

## 2021-04-07 DIAGNOSIS — M25.531 RIGHT WRIST PAIN: ICD-10-CM

## 2021-04-07 DIAGNOSIS — M47.812 OSTEOARTHRITIS OF CERVICAL SPINE, UNSPECIFIED SPINAL OSTEOARTHRITIS COMPLICATION STATUS: ICD-10-CM

## 2021-04-07 PROCEDURE — 72040 X-RAY EXAM NECK SPINE 2-3 VW: CPT

## 2021-04-07 PROCEDURE — 99213 OFFICE O/P EST LOW 20 MIN: CPT | Performed by: ORTHOPAEDIC SURGERY

## 2021-04-07 PROCEDURE — 1036F TOBACCO NON-USER: CPT | Performed by: ORTHOPAEDIC SURGERY

## 2021-04-07 PROCEDURE — 73030 X-RAY EXAM OF SHOULDER: CPT

## 2021-04-07 NOTE — PROGRESS NOTES
50 y o female presents for evaluation right shoulder and neck pain  She had shoulder arthroscopy 03/08/2012 which noted glenohumeral arthritis grade 4 on the anterior glenoid 50% humeral head  She had a concurrent subpec biceps tenodesis  Patient recently saw Dr Angelita Cuellar and underwent glenohumeral injection in January 21 which provided some minimal benefit  She has also sees a pain management doctor for years in the [facility] who was injected trigger points in her back  She has also had neck injections  She denies specific numbness or tingling in her upper extremities but notes some pain at the proximal neck and proximal humerus area  Denies dropping things  She has tried chiropractic in the past and did not like it at all  She is formally a massage therapist   She has not had any cervical traction  She is notes there has been changes at her pain management follow-up with lengthening of the duration between visits which may be contributing to her symptoms      Review of Systems  Review of systems negative unless otherwise specified in HPI    Past Medical History  Past Medical History:   Diagnosis Date    Anxiety     Back problem     Shoulder pain      Past Surgical History  Past Surgical History:   Procedure Laterality Date    BREAST BIOPSY Right 2005    COLONOSCOPY      DILATION AND CURETTAGE OF UTERUS      ENDOMETRIAL ABLATION      LEG SURGERY Left     LEG SURGERY      SHOULDER ARTHROSCOPY Right     shoulder     Current Medications  Current Outpatient Medications on File Prior to Visit   Medication Sig Dispense Refill    Calcium Carbonate-Vitamin D (CALCIUM 500 + D PO) Take 1 tablet by mouth daily      DULoxetine (CYMBALTA) 30 mg delayed release capsule Take 1 capsule (30 mg total) by mouth daily 90 capsule 2    gabapentin (NEURONTIN) 300 mg capsule Take 1 capsule (300 mg total) by mouth 2 (two) times a day 180 capsule 0    GLUCOSAMINE-CHONDROITIN PO Take 2 caplets by mouth daily      ketoconazole (NIZORAL) 2 % cream APPLY TWICE A DAY FOR 2 WEEKS AS NEEDED FOR ITCHING      lidocaine (LMX) 4 % cream Apply topically as needed for mild pain 30 g 0    meloxicam (MOBIC) 15 mg tablet take 1 tablet by mouth once daily 90 tablet 2    multivitamin (THERAGRAN) TABS Take 1 tablet by mouth daily      polyethylene glycol (GLYCOLAX) powder Take 17 g by mouth 2 (two) times a day (Patient taking differently: Take 17 g by mouth 2 (two) times a day as needed ) 578 g 3    tiZANidine (ZANAFLEX) 2 mg tablet Take 2 mg by mouth as needed for muscle spasms        No current facility-administered medications on file prior to visit  Recent Labs [facility])  0   Lab Value Date/Time    HCT 39 6 12/02/2020 1202    HCT 39 1 05/24/2018 1041    HGB 13 3 12/02/2020 1202    HGB 13 6 05/24/2018 1041    WBC 8 76 12/02/2020 1202    WBC 6 4 05/24/2018 1041    INR 0 96 12/02/2020 1202    INR 1 01 05/24/2018 1041    ESR 12 04/02/2021 1057    CRP <3 0 04/02/2021 1057    GLUCOSE 78 07/27/2015 1049    HGBA1C 5 0 07/01/2019 0819     Physical exam  Body mass index is 19 75 kg/m²  · General: Awake, Alert, Oriented, thin  · Eyes: Pupils equal, round and reactive to light  · Heart: regular rate and rhythm  · Lungs: No audible wheezing  · Abdomen: soft  right Shoulder  ·   No tenderness with palpation at the [facility] joint  Mild glenohumeral joint tenderness along the anterior posterior joint line  No pain about the remainder of the right upper extremity  Her external rotation with the arm abducted limited to approximately 10° in the right and 25° on the left  Supraspinatus empty can test elicits no gross weakness  Light touch sensation is grossly intact throughout the bilateral upper extremities as is sharp and dull sensation  She has equal  strength  She has equal biceps and triceps strength  DTRs the triceps brachioradialis 2/4 symmetric bilaterally    She has full symmetric internal rotation behind the back  Her full shoulder forward flexion right is approximately 160°  Abduction is approximately 155°  Radial pulse 4/4 symmetric bilaterally  Cervical spine:  No specific point tenderness with palpation in cervical spine  There is some tenderness over in the right trapezius and a small trigger point near the rhomboid area at the scapular border  She is grossly full and symmetric range of motion of the cervical spine with slight discomfort with left side bending  Spurling's maneuver to both sides elicit discomfort into the neck and upper trap region but does not radiate down the arm  Cervical distraction elicits no discomfort and feels good per patient  Procedure  03/08/2012 right shoulder arthroscopy chondroplasty and sub pectoralis biceps tenodesis  Imaging  I personally reviewed x-rays of the right shoulder and neck which were taken the office today and note glenohumeral joint arthritis with cysts in the humeral head  There is inferior spurring  Cervical spine notes degenerative disc disease with arthritic change C3 through C6 with flattening of the normal lordotic curvature  1  DDD (degenerative disc disease), cervical    2  Osteoarthritis of cervical spine, unspecified spinal osteoarthritis complication status    3  Arthritis of right shoulder region    4  Right wrist pain    5  Right shoulder pain, unspecified chronicity    6  Chronic right shoulder pain      Assessment:  right shoulder glenohumeral arthritis with more symptomatic cervical spine disease and trigger point  Plan: We will have the patient follow up at her request with her pain doctor  She refuse shoulder injection today in the office  We will trial cervical spine traction as this seemed to relieve pressure went attempted in the office today  Follow-up on an as-needed basis per patient request either with Dr Vince Mo or Dr Masood Cabello  This note was created with voice recognition software

## 2021-04-11 DIAGNOSIS — M54.12 CERVICAL RADICULOPATHY: ICD-10-CM

## 2021-04-12 RX ORDER — GABAPENTIN 300 MG/1
CAPSULE ORAL
Qty: 180 CAPSULE | Refills: 0 | Status: SHIPPED | OUTPATIENT
Start: 2021-04-12 | End: 2021-06-11

## 2021-04-14 ENCOUNTER — HOSPITAL ENCOUNTER (OUTPATIENT)
Dept: BONE DENSITY | Facility: HOSPITAL | Age: 56
Discharge: HOME | End: 2021-04-14
Payer: COMMERCIAL

## 2021-04-14 DIAGNOSIS — Z13.820 OSTEOPOROSIS SCREENING: ICD-10-CM

## 2021-04-14 PROCEDURE — 77080 DXA BONE DENSITY AXIAL: CPT

## 2021-04-16 ENCOUNTER — OFFICE VISIT (OUTPATIENT)
Dept: URGENT CARE | Facility: CLINIC | Age: 56
End: 2021-04-16
Payer: COMMERCIAL

## 2021-04-16 ENCOUNTER — HOSPITAL ENCOUNTER (EMERGENCY)
Facility: HOSPITAL | Age: 56
Discharge: HOME | End: 2021-04-16
Attending: EMERGENCY MEDICINE
Payer: COMMERCIAL

## 2021-04-16 VITALS
TEMPERATURE: 97.6 F | HEART RATE: 72 BPM | SYSTOLIC BLOOD PRESSURE: 118 MMHG | WEIGHT: 107.36 LBS | DIASTOLIC BLOOD PRESSURE: 55 MMHG | HEIGHT: 62 IN | RESPIRATION RATE: 16 BRPM | OXYGEN SATURATION: 95 % | BODY MASS INDEX: 19.76 KG/M2

## 2021-04-16 VITALS
RESPIRATION RATE: 16 BRPM | DIASTOLIC BLOOD PRESSURE: 61 MMHG | HEART RATE: 71 BPM | TEMPERATURE: 97 F | BODY MASS INDEX: 19.8 KG/M2 | WEIGHT: 107.6 LBS | OXYGEN SATURATION: 98 % | SYSTOLIC BLOOD PRESSURE: 123 MMHG | HEIGHT: 62 IN

## 2021-04-16 DIAGNOSIS — H61.23 BILATERAL HEARING LOSS DUE TO CERUMEN IMPACTION: Primary | ICD-10-CM

## 2021-04-16 DIAGNOSIS — H61.22 IMPACTED CERUMEN OF LEFT EAR: Primary | ICD-10-CM

## 2021-04-16 PROCEDURE — 99213 OFFICE O/P EST LOW 20 MIN: CPT | Performed by: PHYSICIAN ASSISTANT

## 2021-04-16 PROCEDURE — 69209 REMOVE IMPACTED EAR WAX UNI: CPT | Performed by: PHYSICIAN ASSISTANT

## 2021-04-16 PROCEDURE — S9083 URGENT CARE CENTER GLOBAL: HCPCS | Performed by: PHYSICIAN ASSISTANT

## 2021-04-16 PROCEDURE — 99283 EMERGENCY DEPT VISIT LOW MDM: CPT

## 2021-04-16 PROCEDURE — 96372 THER/PROPH/DIAG INJ SC/IM: CPT

## 2021-04-16 PROCEDURE — 99285 EMERGENCY DEPT VISIT HI MDM: CPT | Performed by: EMERGENCY MEDICINE

## 2021-04-16 RX ORDER — OXYCODONE HYDROCHLORIDE AND ACETAMINOPHEN 5; 325 MG/1; MG/1
1 TABLET ORAL ONCE
Status: COMPLETED | OUTPATIENT
Start: 2021-04-16 | End: 2021-04-16

## 2021-04-16 RX ORDER — KETOROLAC TROMETHAMINE 30 MG/ML
30 INJECTION, SOLUTION INTRAMUSCULAR; INTRAVENOUS ONCE
Status: COMPLETED | OUTPATIENT
Start: 2021-04-16 | End: 2021-04-16

## 2021-04-16 RX ORDER — OXYCODONE HYDROCHLORIDE AND ACETAMINOPHEN 5; 325 MG/1; MG/1
1 TABLET ORAL EVERY 6 HOURS PRN
Qty: 12 TABLET | Refills: 0 | Status: SHIPPED | OUTPATIENT
Start: 2021-04-16 | End: 2021-05-07 | Stop reason: ALTCHOICE

## 2021-04-16 RX ADMIN — KETOROLAC TROMETHAMINE 30 MG: 30 INJECTION, SOLUTION INTRAMUSCULAR; INTRAVENOUS at 22:52

## 2021-04-16 RX ADMIN — OXYCODONE HYDROCHLORIDE AND ACETAMINOPHEN 1 TABLET: 5; 325 TABLET ORAL at 22:53

## 2021-04-16 NOTE — PROGRESS NOTES
[address] [facility]        NAME: Bhaskar Arenas is a 50 y o  female  : 1965    MRN: [id number]  DATE: 2021  TIME: 10:11 AM    Assessment and Plan   Bilateral hearing loss due to cerumen impaction [H61 23]  1  Bilateral hearing loss due to cerumen impaction           Patient Instructions       Follow up with PCP in 3-5 days  Proceed to  ER if symptoms worsen  Chief Complaint     Chief Complaint   Patient presents with   Shiva Euceda     c/o left ear pain since Wednesday  Denies fever  History of Present Illness        Patient presents with left ear pain for the past 2 days  She states she has history of chronic ear problems  She does have slight muffled hearing  Denies use of Q-tips  Review of Systems   Review of Systems   Constitutional: Negative for chills and fever  HENT: Positive for ear pain and hearing loss  Negative for ear discharge            Current Medications       Current Outpatient Medications:     Calcium Carbonate-Vitamin D (CALCIUM 500 + D PO), Take 1 tablet by mouth daily, Disp: , Rfl:     DULoxetine (CYMBALTA) 30 mg delayed release capsule, Take 1 capsule (30 mg total) by mouth daily, Disp: 90 capsule, Rfl: 2    gabapentin (NEURONTIN) 300 mg capsule, take 1 capsule by mouth twice a day, Disp: 180 capsule, Rfl: 0    GLUCOSAMINE-CHONDROITIN PO, Take 2 caplets by mouth daily, Disp: , Rfl:     lidocaine (LMX) 4 % cream, Apply topically as needed for mild pain, Disp: 30 g, Rfl: 0    meloxicam (MOBIC) 15 mg tablet, take 1 tablet by mouth once daily, Disp: 90 tablet, Rfl: 2    multivitamin (THERAGRAN) TABS, Take 1 tablet by mouth daily, Disp: , Rfl:     tiZANidine (ZANAFLEX) 2 mg tablet, Take 2 mg by mouth as needed for muscle spasms , Disp: , Rfl:     ketoconazole (NIZORAL) 2 % cream, APPLY TWICE A DAY FOR 2 WEEKS AS NEEDED FOR ITCHING, Disp: , Rfl:     polyethylene glycol (GLYCOLAX) powder, Take 17 g by mouth 2 (two) times a day (Patient not taking: Reported on 4/16/2021), Disp: 578 g, Rfl: 3    Current Allergies     Allergies as of 04/16/2021 - Reviewed 04/16/2021   Allergen Reaction Noted    Amitriptyline  07/01/2013            The following portions of the patient's history were reviewed and updated as appropriate: allergies, current medications, past family history, past medical history, past social history, past surgical history and problem list      Past Medical History:   Diagnosis Date    Anxiety     Back problem     Shoulder pain        Past Surgical History:   Procedure Laterality Date    BREAST BIOPSY Right 2005    COLONOSCOPY      DILATION AND CURETTAGE OF UTERUS      ENDOMETRIAL ABLATION      LEG SURGERY Left     LEG SURGERY      SHOULDER ARTHROSCOPY Right     shoulder       Family History   Problem Relation Age of Onset    Colon cancer Paternal Grandmother     Breast cancer Paternal Grandmother 79    Arthritis Family     Diabetes Family     Hypertension Family     Osteoporosis Family     Ovarian cancer Family     Uterine cancer Family     No Known Problems Mother     Heart disease Father     Heart disease Paternal Grandfather     Diabetes Paternal Aunt     No Known Problems Paternal Aunt          Medications have been verified  Objective   /61 (BP Location: Left arm, Patient Position: Sitting, Cuff Size: Adult)   Pulse 71   Temp (!) 97 °F (36 1 °C) (Temporal)   Resp 16   Ht 5' 2" (1 575 m)   Wt 48 8 kg (107 lb 9 6 oz)   SpO2 98%   BMI 19 68 kg/m²   No LMP recorded  Patient has had an ablation  Physical Exam     Physical Exam  Vitals signs and nursing note reviewed  Constitutional:       Appearance: Normal appearance  HENT:      Head: Normocephalic and atraumatic  Ears:      Comments: Bilateral cerumen impaction deep within the canals  Mouth/Throat:      Mouth: Mucous membranes are moist    Cardiovascular:      Rate and Rhythm: Normal rate and regular rhythm     Pulmonary:      Effort: Pulmonary effort is normal    Skin:     General: Skin is warm  Neurological:      Mental Status: She is alert  Ear cerumen removal    Date/Time: 4/16/2021 10:12 AM  Performed by: Carolina Morel PA-C  Authorized by: Carolina Morel PA-C   Universal Protocol:  Procedure performed by: (nurse)  Risks and benefits: risks, benefits and alternatives were discussed  Consent given by: patient  Patient understanding: patient states understanding of the procedure being performed  Patient identity confirmed: verbally with patient      Patient location: care now  Procedure details:     Local anesthetic:  None    Location:  L ear    Procedure type: irrigation only      Approach:  External    Equipment used:    [facility]  Post-procedure details:     Complication:  None    Hearing quality:  Normal    Patient tolerance of procedure: Tolerated well, no immediate complications  Comments:      Unsuccessful ear irrigation   Cerumen still deep within the canal

## 2021-04-16 NOTE — PATIENT INSTRUCTIONS
Use Debrox over-the-counter and follow-up your PCP Monday as scheduled  Cerumen Impaction   WHAT YOU NEED TO KNOW:   Cerumen impaction is the blockage of the outer ear canal by tightly packed cerumen (earwax)  It is generally treated with procedures such as flushing or suctioning the ear canal or the use of instruments to remove the impaction  DISCHARGE INSTRUCTIONS:   Medicines:  · Ear drops: These are used to soften the wax in your ear  Wax softening ear drops may be bought without a prescription  Ask your healthcare provider how often you should use this medicine  Read the instructions carefully before you use the ear drops  Do the following when you put in ear drops:     ? Warm the drops by holding the bottle in your hands for a few minutes  Cold ear drops may make you dizzy  ? Lie down with the affected ear toward the ceiling  You may also stand with your head tilted to one side  ? Pull your ear lobe up and back, and place the correct number of drops into the ear  ? Keep your ear facing up for 5 to 10 minutes so the drops coat the outer ear canal      ? Gently clean the outer part of the ear with a cotton swab  Do not  place the cotton swab or anything inside your ear canal  This increases the risk of damaging your eardrum  · Take your medicine as directed  Contact your healthcare provider if you think your medicine is not helping or if you have side effects  Tell him of her if you are allergic to any medicine  Keep a list of the medicines, vitamins, and herbs you take  Include the amounts, and when and why you take them  Bring the list or the pill bottles to follow-up visits  Carry your medicine list with you in case of an emergency  Follow up with your healthcare provider as directed:  Write down your questions so you remember to ask them during your visits  Contact your healthcare provider if:   · You have a fever  · You have trouble hearing or ringing in your ear      · You have questions about your condition or care  Return to the emergency department if:   · You feel dizzy  · You have discharge or blood coming out of your ear  · Your ear pain does not go away or gets worse  © Copyright [employer] 2018 Information is for End User's use only and may not be sold, redistributed or otherwise used for commercial purposes  All illustrations and images included in CareNotes® are the copyrighted property of A D [employer] M , Inc  or [facility]  The above information is an  only  It is not intended as medical advice for individual conditions or treatments  Talk to your doctor, nurse or pharmacist before following any medical regimen to see if it is safe and effective for you

## 2021-04-17 NOTE — ED PROVIDER NOTES
History  Chief Complaint   Patient presents with    Ear Problem     Patient has a "big ball of wax" in her left ear  Was in urgent care with the ear drops to loosen it up  They tried to flush it without success  She tried swimmers ear drops but cannot take the pressure and pain anymore  Patient is a 61-year-old female  She is seen at urgent care earlier for the same  She has been having ear pain  It is to the left ear  No drainage  No fever or chills  She was found to have a cerumen impaction  Irrigation was done at urgent care without success  Patient was discharged  She presents to the emergency room complaining of left ear pain  Prior to Admission Medications   Prescriptions Last Dose Informant Patient Reported? Taking?    Calcium Carbonate-Vitamin D (CALCIUM 500 + D PO) 4/16/2021 at Unknown time Self Yes Yes   Sig: Take 1 tablet by mouth daily   DULoxetine (CYMBALTA) 30 mg delayed release capsule 4/16/2021 at Unknown time Self No Yes   Sig: Take 1 capsule (30 mg total) by mouth daily   GLUCOSAMINE-CHONDROITIN PO 4/16/2021 at Unknown time Self Yes Yes   Sig: Take 2 caplets by mouth daily   gabapentin (NEURONTIN) 300 mg capsule 4/16/2021 at Unknown time  No Yes   Sig: take 1 capsule by mouth twice a day   ketoconazole (NIZORAL) 2 % cream  Self Yes Yes   Sig: APPLY TWICE A DAY FOR 2 WEEKS AS NEEDED FOR ITCHING   lidocaine (LMX) 4 % cream  Self No Yes   Sig: Apply topically as needed for mild pain   meloxicam (MOBIC) 15 mg tablet 4/16/2021 at Unknown time Self No Yes   Sig: take 1 tablet by mouth once daily   multivitamin (THERAGRAN) TABS 4/16/2021 at Unknown time Self Yes Yes   Sig: Take 1 tablet by mouth daily   polyethylene glycol (GLYCOLAX) powder Not Taking at Unknown time Self No No   Sig: Take 17 g by mouth 2 (two) times a day   Patient not taking: Reported on 4/16/2021   tiZANidine (ZANAFLEX) 2 mg tablet 4/16/2021 at Unknown time Self Yes Yes   Sig: Take 2 mg by mouth as needed for muscle spasms       Facility-Administered Medications: None       Past Medical History:   Diagnosis Date    Anxiety     Back problem     Shoulder pain        Past Surgical History:   Procedure Laterality Date    BREAST BIOPSY Right 2005    COLONOSCOPY      DILATION AND CURETTAGE OF UTERUS      ENDOMETRIAL ABLATION      LEG SURGERY Left     LEG SURGERY      SHOULDER ARTHROSCOPY Right     shoulder       Family History   Problem Relation Age of Onset    Colon cancer Paternal Grandmother     Breast cancer Paternal Grandmother 79    Arthritis Family     Diabetes Family     Hypertension Family     Osteoporosis Family     Ovarian cancer Family     Uterine cancer Family     No Known Problems Mother     Heart disease Father     Heart disease Paternal Grandfather     Diabetes Paternal Aunt     No Known Problems Paternal Aunt      I have reviewed and agree with the history as documented  E-Cigarette/Vaping    E-Cigarette Use Never User      E-Cigarette/Vaping Substances    Nicotine No     THC No     CBD No     Flavoring No     Other No     Unknown No      Social History     Tobacco Use    Smoking status: Former Smoker     Types: Cigarettes     Quit date: 1991     Years since quittin 2    Smokeless tobacco: Never Used   Substance Use Topics    Alcohol use: Yes     Alcohol/week: 7 0 standard drinks     Types: 7 Glasses of wine per week     Comment: glass of wine nightly    Drug use: No       Review of Systems   Constitutional: Negative for chills and fever  HENT: Positive for ear pain  Negative for ear discharge  All other systems reviewed and are negative  Physical Exam  Physical Exam  Vitals signs reviewed  Constitutional:       General: She is not in acute distress  HENT:      Head: Normocephalic and atraumatic  Comments: Left ear canal is totally obstructed with cerumen  External ears normal   No drainage or bleeding       Nose: Nose normal    Eyes: General:         Right eye: No discharge  Left eye: No discharge  Conjunctiva/sclera: Conjunctivae normal    Neck:      Musculoskeletal: Neck supple  No neck rigidity  Pulmonary:      Effort: Pulmonary effort is normal  No respiratory distress  Musculoskeletal:         General: No deformity or signs of injury  Skin:     General: Skin is warm and dry  Neurological:      General: No focal deficit present  Mental Status: She is alert and oriented to person, place, and time  Vital Signs  ED Triage Vitals [04/16/21 2206]   Temperature Pulse Respirations Blood Pressure SpO2   97 6 °F (36 4 °C) 72 16 120/56 97 %      Temp Source Heart Rate Source Patient Position - Orthostatic VS BP Location FiO2 (%)   Temporal Monitor Sitting Left arm --      Pain Score       Worst Possible Pain           Vitals:    04/16/21 2206 04/16/21 2215   BP: 120/56 120/56   Pulse: 72 67   Patient Position - Orthostatic VS: Sitting Sitting         Visual Acuity      ED Medications  Medications   oxyCODONE-acetaminophen (PERCOCET) 5-325 mg per tablet 1 tablet (has no administration in time range)   ketorolac (TORADOL) injection 30 mg (30 mg Intramuscular Given 4/16/21 2252)       Diagnostic Studies  Results Reviewed     None                 No orders to display              Procedures  Procedures         ED Course                             SBIRT 22yo+      Most Recent Value   SBIRT (22 yo +)   In order to provide better care to our patients, we are screening all of our patients for alcohol and drug use  Would it be okay to ask you these screening questions? Yes Filed at: 04/16/2021 2210   Initial Alcohol Screen: US AUDIT-C    1  How often do you have a drink containing alcohol?  0 Filed at: 04/16/2021 2210   2  How many drinks containing alcohol do you have on a typical day you are drinking? 0 Filed at: 04/16/2021 2210   3a  Male UNDER 65: How often do you have five or more drinks on one occasion?   0 Filed at: 04/16/2021 2210   3b  FEMALE Any Age, or MALE 65+: How often do you have 4 or more drinks on one occassion? 0 Filed at: 04/16/2021 2210   Audit-C Score  0 Filed at: 04/16/2021 2210   DUARTE: How many times in the past year have you    Used an illegal drug or used a prescription medication for non-medical reasons? Never Filed at: 04/16/2021 2210                    [facility]  Number of Diagnoses or Management Options  Diagnosis management comments: Attempted to remove wax with ear curette  Patient could not tolerate this due to pain  In fact even attempts to look into the ear canal with the out a scope was difficult for patient  Patient will require referral to ENT for definitive treatment  Disposition  Final diagnoses:   Impacted cerumen of left ear     Time reflects when diagnosis was documented in both MDM as applicable and the Disposition within this note     Time User Action Codes Description Comment    4/16/2021 10:42 PM Freda Acevedo [J35 [id number]] Impacted cerumen of left ear       ED Disposition     ED Disposition Condition Date/Time Comment    Discharge Stable Fri Apr 16, 2021 10:42 PM Jazmín Venegas discharge to home/self care  Follow-up Information     Follow up With Specialties Details Why Contact Info Additional Information    [facility] THE Ent Otolaryngology In 3 days  [address] [facility] [address]  [address] [place] [address], [address] [place] [address], [id number] [phone]          Patient's Medications   Discharge Prescriptions    OXYCODONE-ACETAMINOPHEN (PERCOCET) 5-325 MG PER TABLET    Take 1 tablet by mouth every 6 (six) hours as needed for severe painMax Daily Amount: 4 tablets       Start Date: 4/16/2021 End Date: --       Order Dose: 1 tablet       Quantity: 12 tablet    Refills: 0     No discharge procedures on file      PDMP Review     None          ED Provider  Electronically Signed by           Jesus Armstrong MD  04/16/21 7365

## 2021-04-19 ENCOUNTER — OFFICE VISIT (OUTPATIENT)
Dept: FAMILY MEDICINE CLINIC | Facility: CLINIC | Age: 56
End: 2021-04-19

## 2021-04-19 VITALS
WEIGHT: 108.2 LBS | SYSTOLIC BLOOD PRESSURE: 122 MMHG | TEMPERATURE: 97.5 F | HEART RATE: 67 BPM | RESPIRATION RATE: 18 BRPM | OXYGEN SATURATION: 99 % | BODY MASS INDEX: 19.91 KG/M2 | HEIGHT: 62 IN | DIASTOLIC BLOOD PRESSURE: 78 MMHG

## 2021-04-19 DIAGNOSIS — H61.23 HEARING LOSS OF BOTH EARS DUE TO CERUMEN IMPACTION: ICD-10-CM

## 2021-04-19 DIAGNOSIS — H61.23 BILATERAL IMPACTED CERUMEN: Primary | ICD-10-CM

## 2021-04-19 RX ORDER — GABAPENTIN 300 MG/1
300 CAPSULE ORAL 2 TIMES DAILY
Qty: 180 CAPSULE | Refills: 1 | Status: CANCELLED | OUTPATIENT
Start: 2021-04-19

## 2021-04-19 NOTE — PROGRESS NOTES
Assessment/Plan:     Diagnoses and all orders for this visit:    Bilateral impacted cerumen  -     Ambulatory Referral to Otolaryngology; Future    Hearing loss of both ears due to cerumen impaction  -     Ambulatory Referral to Otolaryngology; Future    Other orders  -     Cancel: gabapentin (NEURONTIN) 300 mg capsule; Take 1 capsule (300 mg total) by mouth 2 (two) times a day  -     Ear cerumen removal        Ear cerumen removal    Date/Time: 4/19/2021 2:36 PM  Performed by: Leopoldo Oregon, PA-C  Authorized by: Leopoldo Oregon, PA-C   Universal Protocol:  Consent: Verbal consent obtained  Written consent not obtained  Risks and benefits: risks, benefits and alternatives were discussed  Consent given by: patient  Time out: Immediately prior to procedure a "time out" was called to verify the correct patient, procedure, equipment, support staff and site/side marked as required  Timeout called at: 4/19/2021 2:15 PM   Patient understanding: patient states understanding of the procedure being performed  Patient consent: the patient's understanding of the procedure matches consent given  Patient identity confirmed: verbally with patient      Patient location:  Clinic  Indications / Diagnosis:  Bilateral cerumen impaction  Procedure details:     Local anesthetic:  None    Location:  L ear    Procedure type: irrigation only    Post-procedure details:     Complication:  None    Hearing quality:  Improved    Patient tolerance of procedure: Tolerated well, no immediate complications  Comments:      Cerumen was successfully irrigated from the left ear  Patient reported improvement of her hearing after the procedure  Irrigation was not attempted in the right ear  Patient was advised to use debrox drops and follow up with ENT for this  Return if symptoms worsen or fail to improve  Subjective:        Patient ID: Joy Lantigua is a 50 y o  female      Chief Complaint   Patient presents with    Cerumen Impaction  Earache     since thursday       Katie Kern is a 50year old female presenting for follow up of left ear cerumen impaction  Patient was evaluated at urgent care on 4/16 due to left cerumen impaction  Irrigation was unsuccessful  Patient then presented to the ED 4/16 for the same  Irrigation was again unsuccessful  She tried debrox drops a few times last week but did not use this over the weekend as she did not see improvement  She notes ongoing hearing loss and pressure in the left ear  Denies other URI symptoms        The following portions of the patient's history were reviewed and updated as appropriate: allergies, current medications, past family history, past medical history, past social history, past surgical history and problem list     Patient Active Problem List   Diagnosis    Allergic rhinitis    Arthritis    Cervical radiculopathy    Neck pain    Chronic pain of right ankle    Depression    Esophageal reflux    Fatigue    Fibromyalgia    Herniated lumbar intervertebral disc    Hypercholesteremia    Hypocalcemia    Hyponatremia    Narcolepsy    Spondylosis of lumbosacral region without myelopathy or radiculopathy    Sinus bradycardia    Primary localized osteoarthrosis of the hip    Vitamin D deficiency    Multiple acquired skin tags       Current Outpatient Medications   Medication Sig Dispense Refill    Calcium Carbonate-Vitamin D (CALCIUM 500 + D PO) Take 1 tablet by mouth daily      DULoxetine (CYMBALTA) 30 mg delayed release capsule Take 1 capsule (30 mg total) by mouth daily 90 capsule 2    gabapentin (NEURONTIN) 300 mg capsule take 1 capsule by mouth twice a day 180 capsule 0    GLUCOSAMINE-CHONDROITIN PO Take 2 caplets by mouth daily      ketoconazole (NIZORAL) 2 % cream APPLY TWICE A DAY FOR 2 WEEKS AS NEEDED FOR ITCHING      lidocaine (LMX) 4 % cream Apply topically as needed for mild pain 30 g 0    meloxicam (MOBIC) 15 mg tablet take 1 tablet by mouth once daily 90 tablet 2    multivitamin (THERAGRAN) TABS Take 1 tablet by mouth daily      oxyCODONE-acetaminophen (PERCOCET) 5-325 mg per tablet Take 1 tablet by mouth every 6 (six) hours as needed for severe painMax Daily Amount: 4 tablets 12 tablet 0    polyethylene glycol (GLYCOLAX) powder Take 17 g by mouth 2 (two) times a day 578 g 3    tiZANidine (ZANAFLEX) 2 mg tablet Take 2 mg by mouth as needed for muscle spasms        No current facility-administered medications for this visit  Past Medical History:   Diagnosis Date    Anxiety     Back problem     Shoulder pain         Past Surgical History:   Procedure Laterality Date    BREAST BIOPSY Right 2005    COLONOSCOPY      DILATION AND CURETTAGE OF UTERUS      ENDOMETRIAL ABLATION      LEG SURGERY Left     LEG SURGERY      SHOULDER ARTHROSCOPY Right     shoulder        Social History     Socioeconomic History    Marital status: Single     Spouse name: Not on file    Number of children: Not on file    Years of education: Not on file    Highest education level: Not on file   Occupational History    Not on file   Social Needs    Financial resource strain: Not on file    Food insecurity     Worry: Not on file     Inability: Not on file    Transportation needs     Medical: Not on file     Non-medical: Not on file   Tobacco Use    Smoking status: Former Smoker     Types: Cigarettes     Quit date: 1991     Years since quittin 2    Smokeless tobacco: Never Used   Substance and Sexual Activity    Alcohol use:  Yes     Alcohol/week: 7 0 standard drinks     Types: 7 Glasses of wine per week     Comment: glass of wine nightly    Drug use: No    Sexual activity: Not Currently     Partners: Male   Lifestyle    Physical activity     Days per week: Not on file     Minutes per session: Not on file    Stress: Not on file   Relationships    Social connections     Talks on phone: Not on file     Gets together: Not on file     Attends Taoist service: Not on file     Active member of club or organization: Not on file     Attends meetings of clubs or organizations: Not on file     Relationship status: Not on file    Intimate partner violence     Fear of current or ex partner: Not on file     Emotionally abused: Not on file     Physically abused: Not on file     Forced sexual activity: Not on file   Other Topics Concern    Not on file   Social History Narrative    Not on file        Review of Systems   Constitutional: Negative for chills and fever  HENT: Positive for ear pain and hearing loss  Negative for congestion, ear discharge, postnasal drip, rhinorrhea, sinus pressure, sinus pain and sore throat  Gastrointestinal: Negative for abdominal pain, blood in stool, constipation, diarrhea, nausea and vomiting  Skin: Negative for rash and wound  Neurological: Negative for dizziness, syncope, weakness, light-headedness and headaches  Objective:      /78 (BP Location: Left arm, Patient Position: Sitting, Cuff Size: Adult)   Pulse 67   Temp 97 5 °F (36 4 °C) (Tympanic)   Resp 18   Ht 5' 2" (1 575 m)   Wt 49 1 kg (108 lb 3 2 oz)   SpO2 99%   BMI 19 79 kg/m²          Physical Exam  Vitals signs and nursing note reviewed  Constitutional:       General: She is not in acute distress  Appearance: Normal appearance  HENT:      Head: Normocephalic and atraumatic  Right Ear: External ear normal  There is impacted cerumen  Left Ear: Tympanic membrane and external ear normal  There is no impacted cerumen  Ears:      Comments: Non-obstructing cerumen in the left  Impacted cerumen in the right  Eyes:      Extraocular Movements: Extraocular movements intact  Conjunctiva/sclera: Conjunctivae normal       Pupils: Pupils are equal, round, and reactive to light  Neck:      Musculoskeletal: Normal range of motion and neck supple  Cardiovascular:      Rate and Rhythm: Normal rate and regular rhythm        Heart sounds: Normal heart sounds  No murmur  Pulmonary:      Effort: Pulmonary effort is normal  No respiratory distress  Breath sounds: Normal breath sounds  No wheezing  Musculoskeletal:      Right lower leg: No edema  Left lower leg: No edema  Skin:     General: Skin is warm and dry  Neurological:      General: No focal deficit present  Mental Status: She is alert and oriented to person, place, and time  Cranial Nerves: No cranial nerve deficit  Motor: No weakness     Psychiatric:         Mood and Affect: Mood normal          Behavior: Behavior normal

## 2021-04-22 ENCOUNTER — TELEPHONE (OUTPATIENT)
Dept: OBGYN CLINIC | Facility: HOSPITAL | Age: 56
End: 2021-04-22

## 2021-04-22 NOTE — TELEPHONE ENCOUNTER
Patient sees Sparkle Burk,    Patient called to see what she should do for PT, I gave her the information and she was ok

## 2021-04-28 ENCOUNTER — LAB REQUISITION (OUTPATIENT)
Dept: LAB | Facility: HOSPITAL | Age: 56
End: 2021-04-28
Payer: COMMERCIAL

## 2021-04-28 ENCOUNTER — IMMUNIZATIONS (OUTPATIENT)
Dept: FAMILY MEDICINE CLINIC | Facility: HOSPITAL | Age: 56
End: 2021-04-28

## 2021-04-28 ENCOUNTER — CONSULT (OUTPATIENT)
Dept: SURGERY | Facility: CLINIC | Age: 56
End: 2021-04-28
Payer: COMMERCIAL

## 2021-04-28 VITALS
HEIGHT: 62 IN | TEMPERATURE: 98.5 F | HEART RATE: 64 BPM | WEIGHT: 109 LBS | DIASTOLIC BLOOD PRESSURE: 84 MMHG | SYSTOLIC BLOOD PRESSURE: 127 MMHG | BODY MASS INDEX: 20.06 KG/M2

## 2021-04-28 DIAGNOSIS — L91.8 OTHER HYPERTROPHIC DISORDERS OF THE SKIN: ICD-10-CM

## 2021-04-28 DIAGNOSIS — L91.8 MULTIPLE ACQUIRED SKIN TAGS: Primary | ICD-10-CM

## 2021-04-28 DIAGNOSIS — L98.9 SKIN LESION OF FACE: Primary | ICD-10-CM

## 2021-04-28 DIAGNOSIS — Z23 ENCOUNTER FOR IMMUNIZATION: Primary | ICD-10-CM

## 2021-04-28 PROCEDURE — 88305 TISSUE EXAM BY PATHOLOGIST: CPT | Performed by: PATHOLOGY

## 2021-04-28 PROCEDURE — 0012A SARS-COV-2 / COVID-19 MRNA VACCINE (MODERNA) 100 MCG: CPT

## 2021-04-28 PROCEDURE — 91301 SARS-COV-2 / COVID-19 MRNA VACCINE (MODERNA) 100 MCG: CPT

## 2021-04-28 PROCEDURE — 99214 OFFICE O/P EST MOD 30 MIN: CPT | Performed by: SURGERY

## 2021-04-28 PROCEDURE — 3008F BODY MASS INDEX DOCD: CPT | Performed by: ORTHOPAEDIC SURGERY

## 2021-05-02 PROBLEM — L98.9 SKIN LESION OF FACE: Status: ACTIVE | Noted: 2021-05-02

## 2021-05-03 ENCOUNTER — EVALUATION (OUTPATIENT)
Dept: PHYSICAL THERAPY | Facility: HOME HEALTHCARE | Age: 56
End: 2021-05-03
Payer: COMMERCIAL

## 2021-05-03 DIAGNOSIS — M54.2 NECK PAIN: Primary | ICD-10-CM

## 2021-05-03 PROCEDURE — 97162 PT EVAL MOD COMPLEX 30 MIN: CPT | Performed by: PHYSICAL THERAPIST

## 2021-05-03 PROCEDURE — 97110 THERAPEUTIC EXERCISES: CPT | Performed by: PHYSICAL THERAPIST

## 2021-05-03 NOTE — PROGRESS NOTES
PT Evaluation     Today's date: 5/3/2021  Patient name: Saul Chopra  : 1965  MRN: [id number]  Referring provider: Jourdan Paez MD  Dx:   Encounter Diagnosis     ICD-10-CM    1  Neck pain  M54 2                   Assessment  Assessment details: Pt Saul Chopra is a 50 y o  who presents to OPPT with s/s consistent with chronic cervical pain 2* to DDD/DJD  Pt presents with limited cervical mobility, limited cervical and B UE strength, impaired soft tissue mobility, decreased postural awareness, radiating symptoms R > L UE, and pain with functional activities  Pt notes that she is most limited with reaching OH, lifting/carrying, and frequently dropping objects as she is R hand dominate  She also has difficulty with dressing, completion of household chores, and disrupted sleeping due to increased pain  Pt would benefit from skilled therapy services to address outlined impairments, work towards goals, and restore pts PLOF  Thank you! Impairments: abnormal or restricted ROM, abnormal movement, activity intolerance, impaired physical strength, lacks appropriate home exercise program, pain with function, scapular dyskinesis, weight-bearing intolerance and poor posture   Understanding of Dx/Px/POC: fair   Prognosis: fair    Goals  STGs to be achieved in 4 weeks:  -Pt to demonstrate reduced subjective pain rating "at worst" by at least 2-3 points from Initial Eval to allow for reduced pain with ADLs and improved functional activity tolerance    -Pt to demonstrate ROM improved in C/S by 5* in order to maximize joint mobility and function and allow for progression of exercise program and achievement of goals    -Pt to demonstrate increased MMT of B UE by at least 1/2 grade in order to improve safety and stability with ADLs and functional mobility       LTGs to be achieved upon discharge:   -Pt will be I with HEP in order to continue to improve quality of life and independence and reduce risk for re-injury    -Pt to demonstrate return to activities of daily living without limiations or restrictions    -Pt will return to lifting >10# to help facilitate return to household activities independently   -Pt to demonstrate ability to lay supine > 60 minutes for improved tolerance to sleeping   -Pt to demonstrate improved function as noted by achieving or exceeding predicted score on FOTO outcomes assessment tool  Plan  Plan details: PT provided pt with detailed HEP program; pt verbalized understanding of program    Patient would benefit from: skilled physical therapy  Planned modality interventions: thermotherapy: hydrocollator packs  Planned therapy interventions: abdominal trunk stabilization, manual therapy, neuromuscular re-education, patient education, postural training, balance, home exercise program, functional ROM exercises, flexibility, therapeutic activities and therapeutic exercise  Frequency: 2x week  Duration in weeks: 4  Plan of Care beginning date: 5/3/2021  Plan of Care expiration date: 6/3/2021  Treatment plan discussed with: patient        Subjective Evaluation    History of Present Illness  Mechanism of injury: Pt reporting that she has bee having neck pain for 10+ years  She has been seeing pain management consistently and notes recent x-rays taken which were (+) for arthritis  MD wants patient to try therapy for traction but pt voiced concerned about it flaring up her pain too much  She will return to MD as needed at this time     Quality of life: good    Pain  At best pain rating: 3  At worst pain rating: 10  Quality: dull ache, sharp, radiating and tight  Relieving factors: heat and rest  Aggravating factors: lifting, overhead activity and keyboarding  Progression: worsening    Hand dominance: right      Diagnostic Tests  X-ray: abnormal  Treatments  Previous treatment: physical therapy  Current treatment: physical therapy  Patient Goals  Patient goals for therapy: decreased edema, decreased pain, improved balance, increased motion, increased strength and independence with ADLs/IADLs          Objective     Concurrent Complaints  Positive for disturbed sleep and headaches  Postural Observations  Seated posture: fair  Standing posture: fair    Additional Postural Observation Details  Forward head posture   Bilateral rounded shoulders    Neurological Testing     Sensation   Cervical/Thoracic   Left   Intact: light touch    Right   Intact: light touch    Active Range of Motion   Cervical/Thoracic Spine       Cervical    Flexion: 35 degrees  with pain  Extension: 35 degrees     with pain  Left lateral flexion: 30 degrees     with pain  Right lateral flexion: 30 degrees     with pain  Left rotation: 40 degrees  Right rotation: 40 degrees             General Comments:      Shoulder Comments   B UE ROM grossly WFL (+) painful movement '    B UE MMT grossly 3+/4-    Cervical/Thoracic Comments  Dropping objects frequently with R hand   Difficulty with lifting < 10# and avoids lifting as able   Difficulty with washing/dressing   Disrupted sleeping  Neuro Exam:     Headaches   Patient reports headaches: Yes                  Re-eval Date: 6/3/21    Date 5/3       Visit Count 1       FOTO Completed             Precautions: none       Manuals 5/3       Mechanical Traction                                 Neuro Re-Ed         MTP/LTP        Tband ER julissa         Wall angels         Ball on wall                                 Ther Ex        UBE - alt  10 minutes        Scapular retractions         Shld FF/Abd        CS retractions         CS isometrics        SA punches         Supine ABCs        Doorway stretch        Prone flex/HA/ext                        Ther Activity                        Gait Training                        Modalities        MHP prn

## 2021-05-03 NOTE — ASSESSMENT & PLAN NOTE
Approximately 1 cm preauricular lesion on the left side of the face  Slightly raised  Flesh-colored except for 1 area which is pigmented and could be a nevus  This is suspicious appearing and patient says she just noticed and was unsure how long been there  4 mm punch biopsy obtained and sent to pathology  Patient  Has a scheduled visit with ENT for separate related issue and I mentioned to her that she should also showed this lesion to ENT as if this was to be resected it would be better done in the hands of local ENT/ Facial plastics physician      I will call her with the pathology results

## 2021-05-03 NOTE — PROGRESS NOTES
Assessment/Plan:    Skin lesion of face  left preauricular   Approximately 1 cm preauricular lesion on the left side of the face  Slightly raised  Flesh-colored except for 1 area which is pigmented and could be a nevus  This is suspicious appearing and patient says she just noticed and was unsure how long been there  4 mm punch biopsy obtained and sent to pathology  Patient  Has a scheduled visit with ENT for separate related issue and I mentioned to her that she should also showed this lesion to ENT as if this was to be resected it would be better done in the hands of local ENT/ Facial plastics physician  I will call her with the pathology results       Diagnoses and all orders for this visit:    Skin lesion of face  left preauricular          Subjective:      Patient ID: Wesley Barajas is a 50 y o  female  74-year-old woman presents today in consultation for evaluation of a skin lesion just in front of her left ear  Patient states she is unclear how long it has been there  Did not noticed before  She felt is rather large and was urged by family to have this checked out  She also wanted to ensure there is nothing worrisome regarding this particular lesion  Denies any bleeding or ulceration  Unsure if it is getting larger  Denies any significant sun exposure her blistering sunburn versus child  No history of skin cancer  The following portions of the patient's history were reviewed and updated as appropriate:   She  has a past medical history of Anxiety, Back problem, and Shoulder pain    She   Patient Active Problem List    Diagnosis Date Noted    Skin lesion of face  left preauricular 05/02/2021    Multiple acquired skin tags 03/30/2021    Hypercholesteremia 08/11/2017    Chronic pain of right ankle 04/04/2017    Allergic rhinitis 01/07/2016    Fatigue 06/03/2014    Vitamin D deficiency 06/03/2014    Hypocalcemia 12/03/2013    Hyponatremia 12/03/2013    Spondylosis of lumbosacral region without myelopathy or radiculopathy 12/03/2013    Sinus bradycardia 12/03/2013    Herniated lumbar intervertebral disc 09/25/2013    Primary localized osteoarthrosis of the hip 09/25/2013    Narcolepsy 09/03/2013    Arthritis 11/06/2012    Fibromyalgia 07/03/2012    Cervical radiculopathy 06/19/2012    Depression 06/14/2012    Esophageal reflux 06/14/2012    Neck pain 04/26/2011     She  has a past surgical history that includes Leg Surgery (Left); Dilation and curettage of uterus; Leg Surgery; Shoulder arthroscopy (Right); Colonoscopy; Breast biopsy (Right, 2005); and Endometrial ablation  Her family history includes Arthritis in her family; Breast cancer (age of onset: 79) in her paternal grandmother; Colon cancer in her paternal grandmother; Diabetes in her family and paternal aunt; Heart disease in her father and paternal grandfather; Hypertension in her family; No Known Problems in her mother and paternal aunt; Osteoporosis in her family; Ovarian cancer in her family; Uterine cancer in her family  She  reports that she quit smoking about 30 years ago  Her smoking use included cigarettes  She has never used smokeless tobacco  She reports current alcohol use of about 7 0 standard drinks of alcohol per week  She reports that she does not use drugs    Current Outpatient Medications   Medication Sig Dispense Refill    Calcium Carbonate-Vitamin D (CALCIUM 500 + D PO) Take 1 tablet by mouth daily      DULoxetine (CYMBALTA) 30 mg delayed release capsule Take 1 capsule (30 mg total) by mouth daily 90 capsule 2    gabapentin (NEURONTIN) 300 mg capsule take 1 capsule by mouth twice a day 180 capsule 0    GLUCOSAMINE-CHONDROITIN PO Take 2 caplets by mouth daily      ketoconazole (NIZORAL) 2 % cream APPLY TWICE A DAY FOR 2 WEEKS AS NEEDED FOR ITCHING      lidocaine (LMX) 4 % cream Apply topically as needed for mild pain 30 g 0    meloxicam (MOBIC) 15 mg tablet take 1 tablet by mouth once daily 90 tablet 2    multivitamin (THERAGRAN) TABS Take 1 tablet by mouth daily      oxyCODONE-acetaminophen (PERCOCET) 5-325 mg per tablet Take 1 tablet by mouth every 6 (six) hours as needed for severe painMax Daily Amount: 4 tablets 12 tablet 0    polyethylene glycol (GLYCOLAX) powder Take 17 g by mouth 2 (two) times a day 578 g 3    tiZANidine (ZANAFLEX) 2 mg tablet Take 2 mg by mouth as needed for muscle spasms        No current facility-administered medications for this visit  She is allergic to amitriptyline       Review of Systems        Review systems completed, all negative except as noted above HPI  Objective:      /84   Pulse 64   Temp 98 5 °F (36 9 °C)   Ht 5' 2" (1 575 m)   Wt 49 4 kg (109 lb)   BMI 19 94 kg/m²            Physical Exam  Vitals signs reviewed  Constitutional:       General: She is not in acute distress  Appearance: Normal appearance  She is normal weight  She is not ill-appearing, toxic-appearing or diaphoretic  HENT:      Head: Normocephalic and atraumatic  Right Ear: External ear normal       Left Ear: External ear normal    Eyes:      General: No scleral icterus  Right eye: No discharge  Left eye: No discharge  Neck:      Musculoskeletal: Normal range of motion and neck supple  No neck rigidity or muscular tenderness  Cardiovascular:      Rate and Rhythm: Normal rate and regular rhythm  Heart sounds: Normal heart sounds  No murmur  No friction rub  No gallop  Pulmonary:      Effort: Pulmonary effort is normal  No respiratory distress  Breath sounds: Normal breath sounds  No stridor  No wheezing or rhonchi  Abdominal:      General: There is no distension  Palpations: Abdomen is soft  There is no mass  Tenderness: There is no abdominal tenderness  Hernia: No hernia is present  Musculoskeletal: Normal range of motion  Right lower leg: No edema  Left lower leg: No edema     Lymphadenopathy: Cervical: No cervical adenopathy  Skin:     General: Skin is warm and dry  Coloration: Skin is not jaundiced or pale  Comments: There is an approximately 1 cm irregular flesh-colored slightly raised lesion that is pre irregular on the left side  Majority of which is flesh-colored however there is a small 1 mm focus of dark pigmentation representing a likely nevus  No ulceration or bleeding  Nontender  Neurological:      General: No focal deficit present  Mental Status: She is alert and oriented to person, place, and time  Cranial Nerves: No cranial nerve deficit  Psychiatric:         Mood and Affect: Mood normal          Behavior: Behavior normal          Thought Content: Thought content normal          Judgment: Judgment normal            Biopsy    Date/Time: 5/2/2021 9:08 PM  Performed by: Cailin Mosley DO  Authorized by: Cailin Mosley DO   Universal Protocol:  Consent: Verbal consent obtained  Written consent obtained  Risks and benefits: risks, benefits and alternatives were discussed  Consent given by: patient  Time out: Immediately prior to procedure a "time out" was called to verify the correct patient, procedure, equipment, support staff and site/side marked as required  Patient understanding: patient states understanding of the procedure being performed  Patient consent: the patient's understanding of the procedure matches consent given  Patient identity confirmed: verbally with patient      Procedure Details - Skin Biopsy:     Biopsy tissue type: skin    Biopsy method: punch biopsy      Body area:  Head/neck    Head/neck location: Left preauricular      Initial size (mm):  4    Final defect size (mm):  4    Malignancy: benign lesion

## 2021-05-06 ENCOUNTER — OFFICE VISIT (OUTPATIENT)
Dept: PHYSICAL THERAPY | Facility: HOME HEALTHCARE | Age: 56
End: 2021-05-06
Payer: COMMERCIAL

## 2021-05-06 DIAGNOSIS — M54.2 NECK PAIN: Primary | ICD-10-CM

## 2021-05-06 PROCEDURE — 97110 THERAPEUTIC EXERCISES: CPT

## 2021-05-06 PROCEDURE — 97012 MECHANICAL TRACTION THERAPY: CPT

## 2021-05-06 PROCEDURE — 97112 NEUROMUSCULAR REEDUCATION: CPT

## 2021-05-06 NOTE — PROGRESS NOTES
Daily Note     Today's date: 2021  Patient name: Val Crouch  : 1965  MRN: [id number]  Referring provider: RENEE Ham  Dx:   Encounter Diagnosis     ICD-10-CM    1  Neck pain  M54 2                   Subjective: Pt reports she doesn't have any pain in her neck right now  Pt reports she is seeing pain management for her neck  Pt reports she has her most pain at night  Objective: See treatment diary below      Assessment: Tolerated treatment fairly well  Verbal cues needed t/o exercise to perform correctly  No pain reported in her neck t/o session  Pt challenged with wall angels  Pt tolerated  mechanical traction well with no adverse reaction or pain  Patient would benefit from continued PT      Plan: Continue per plan of care        Re-eval Date: 6/3/21    Date 5/3 5/6/21      Visit Count 1 2      FOTO Completed             Precautions: none       Manuals 5/3 5/6/21      Mechanical Traction   10 min   10 lb max  0 min  6 steps                                Neuro Re-Ed         MTP/LTP  Green 1 x 15 ea       Tband ER julissa   1 x 15 red       Wall angels   1 x 10       Ball on wall                                 Ther Ex        UBE - alt  10 minutes  10 min       Scapular retractions   1 x 20       Shld FF/Abd  1 x 10 ea       CS retractions   5" x 10       CS isometrics  5" x 5 ea       SA punches         Supine ABCs        Doorway stretch        Prone flex/HA/ext                        Ther Activity                        Gait Training                        Modalities        MHP prn

## 2021-05-07 ENCOUNTER — OFFICE VISIT (OUTPATIENT)
Dept: OTOLARYNGOLOGY | Facility: CLINIC | Age: 56
End: 2021-05-07
Payer: COMMERCIAL

## 2021-05-07 VITALS
TEMPERATURE: 96.3 F | HEIGHT: 62 IN | BODY MASS INDEX: 19.51 KG/M2 | OXYGEN SATURATION: 98 % | HEART RATE: 87 BPM | SYSTOLIC BLOOD PRESSURE: 98 MMHG | WEIGHT: 106 LBS | DIASTOLIC BLOOD PRESSURE: 62 MMHG

## 2021-05-07 DIAGNOSIS — H61.23 BILATERAL IMPACTED CERUMEN: ICD-10-CM

## 2021-05-07 DIAGNOSIS — M26.622 ARTHRALGIA OF LEFT TEMPOROMANDIBULAR JOINT: Primary | ICD-10-CM

## 2021-05-07 DIAGNOSIS — H61.23 HEARING LOSS OF BOTH EARS DUE TO CERUMEN IMPACTION: ICD-10-CM

## 2021-05-07 PROCEDURE — 69210 REMOVE IMPACTED EAR WAX UNI: CPT | Performed by: OTOLARYNGOLOGY

## 2021-05-07 PROCEDURE — 99203 OFFICE O/P NEW LOW 30 MIN: CPT | Performed by: OTOLARYNGOLOGY

## 2021-05-07 PROCEDURE — 3008F BODY MASS INDEX DOCD: CPT | Performed by: OTOLARYNGOLOGY

## 2021-05-07 PROCEDURE — 1036F TOBACCO NON-USER: CPT | Performed by: OTOLARYNGOLOGY

## 2021-05-07 NOTE — PROGRESS NOTES
Consultation - Otolaryngology - Head and Neck Surgery  Facial Plastic and Reconstructive Surgery  Juan Henry 50 y o  female MRN: [id number]  Encounter: [id number]        Assessment/Plan:  1  Arthralgia of left temporomandibular joint     2  Bilateral impacted cerumen  Ambulatory Referral to Otolaryngology   3  Hearing loss of both ears due to cerumen impaction  Ambulatory Referral to Otolaryngology       Temporomandibular joint syndrome: We discussed soft diet for 2 weeks, appropriate use of NSAIDs for 1 week, warm compresses, massage and PT for TMJ therapy  Will refer to PT for TMJ and follow up PRN  Cerumen impaction:  We discussed the nature of cerumen impaction  We discussed appropriate treat with hydrogen peroxide 4-5 drops once per week  We discussed discontinuing the use of any Q-Tips or other objects into the ear  History of Present Illness   Physician Requesting Consult: Juan Ramon Pa PA-C   Reason for Consult / Principal Problem: ear wax and left ear pain  HPI: Juan Henry is a 50y o  year old female who presents with   History of recurrent left ear pain  She was told this was due to a cerumen problem which was blocking up her left ear  She was told that there was no problem with her right ear  No ear drainage  She felt that the pain was a sharp stabbing pain and when she presented to the emergency room she was told it was due to cerumen  They attempted to remove the cerumen but were unsuccessful  Her primary care physician was able to remove some cerumen from the left ear  She continued to have some pain in that ear which eventually resolved  No ear drainage  No ear fullness  No hearing change after the removal of the cerumen from the left ear  She has continued to have sensation of occasional left ear pain  She had a biopsy performed on her left preauricular area 8 days ago by Dr Brissa Palacios  Biopsy returned demonstrating seborrheic keratosis      Review of systems:  10 Point ROS was performed and negative except as above or otherwise noted in the medical record      Historical Information   Past Medical History:   Diagnosis Date    Anxiety     Back problem     Shoulder pain      Past Surgical History:   Procedure Laterality Date    BREAST BIOPSY Right 2005    COLONOSCOPY      DILATION AND CURETTAGE OF UTERUS      ENDOMETRIAL ABLATION      LEG SURGERY Left     LEG SURGERY      SHOULDER ARTHROSCOPY Right     shoulder     Social History   Social History     Substance and Sexual Activity   Alcohol Use Yes    Alcohol/week: 7 0 standard drinks    Types: 7 Glasses of wine per week    Comment: glass of wine nightly     Social History     Substance and Sexual Activity   Drug Use No     Social History     Tobacco Use   Smoking Status Former Smoker    Types: Cigarettes    Quit date: 1991    Years since quittin 3   Smokeless Tobacco Never Used     Family History:   Family History   Problem Relation Age of Onset    Colon cancer Paternal Grandmother     Breast cancer Paternal Grandmother 79    Arthritis Family     Diabetes Family     Hypertension Family     Osteoporosis Family     Ovarian cancer Family     Uterine cancer Family     No Known Problems Mother     Heart disease Father     Heart disease Paternal Grandfather     Diabetes Paternal Aunt     No Known Problems Paternal Aunt        Current Outpatient Medications on File Prior to Visit   Medication Sig    Calcium Carbonate-Vitamin D (CALCIUM 500 + D PO) Take 1 tablet by mouth daily    DULoxetine (CYMBALTA) 30 mg delayed release capsule Take 1 capsule (30 mg total) by mouth daily    gabapentin (NEURONTIN) 300 mg capsule take 1 capsule by mouth twice a day    GLUCOSAMINE-CHONDROITIN PO Take 2 caplets by mouth daily    ketoconazole (NIZORAL) 2 % cream APPLY TWICE A DAY FOR 2 WEEKS AS NEEDED FOR ITCHING    lidocaine (LMX) 4 % cream Apply topically as needed for mild pain    meloxicam (MOBIC) 15 mg tablet take 1 tablet by mouth once daily    multivitamin (THERAGRAN) TABS Take 1 tablet by mouth daily    polyethylene glycol (GLYCOLAX) powder Take 17 g by mouth 2 (two) times a day    tiZANidine (ZANAFLEX) 2 mg tablet Take 2 mg by mouth as needed for muscle spasms     [DISCONTINUED] oxyCODONE-acetaminophen (PERCOCET) 5-325 mg per tablet Take 1 tablet by mouth every 6 (six) hours as needed for severe painMax Daily Amount: 4 tablets     No current facility-administered medications on file prior to visit  Allergies   Allergen Reactions    Amitriptyline      Other reaction(s): Constipation, Dry mouth       Vitals:    05/07/21 1452   BP: 98/62   Pulse: 87   Temp: (!) 96 3 °F (35 7 °C)   SpO2: 98%       Physical Exam   Constitutional: Oriented to person, place, and time  Well-developed and well-nourished, no apparent distress, non-toxic appearance  Cooperative, able to hear and answer questions without difficulty  Voice: Normal voice quality  Head: Normocephalic, atraumatic  No scars, masses or lesions  Face: Symmetric, no edema, no sinus tenderness  Eyes: Vision grossly intact, extra-ocular movement intact  Ears: External ears normal  Tympanic membranes intact with intact normal landmarks  No post-auricular erythema or tenderness  Nose: Septum intact, nares clear  Mucosa moist, turbinates well appearing  No crusting, polyps or discharge evident  Oral cavity: Dentition intact  Mucosa moist, lips without lesions or masses  Tongue mobile, floor of mouth soft and flat  Hard palate intact  No masses or lesions  Oropharynx: Uvula is midline, soft palate intact without lesion or mass  Oropharyngeal inlet without obstruction  Tonsils unremarkable  Posterior pharyngeal wall clear  No masses or lesions  Salivary glands:  Parotid glands and submandibular glands symmetric, no enlargement or tenderness  Neck: Normal laryngeal elevation with swallow  Trachea midline  No masses or lesions   No palpable adenopathy  Thyroid: Without tenderness or palpable nodules  Pulmonary/Chest: Normal effort and rate  No respiratory distress  No stertor or stridor  Musculoskeletal: Normal range of motion  Neurological: Cranial nerves 2-12 intact  Skin: Skin is warm and dry  Psychiatric: Normal mood and affect  Procedure: Cerumen debridement right ear    Indications: Cerumen impaction right ear    Procedure in detail: After informed verbal consent was obtained the ear was visualized using microscopy  Using cerumen loop, suction, and alligator forceps the cerumen was debrided and the findings below were seen  The patient tolerated the procedure well  FINDINGS:  Right ear cerumen completely impacted  Removed with right angle  We discussed extensively that she is having pain her left ear which she has been attributing to cerumen but there is no significant cerumen in left ear  The right ear on the other hand is completely impacted with cerumen but has no pain  Discussed that cerumen does not cause significant discomfort as evidenced by her findings and symptoms  We discussed that her symptoms are most likely due to TMJ arthralgia at which she has significant jaw clenching due to personal anxiety      Imaging Studies: I have personally reviewed pertinent reports  Lab Results: I have personally reviewed pertinent lab results        Records reviewed: None

## 2021-05-10 ENCOUNTER — OFFICE VISIT (OUTPATIENT)
Dept: PHYSICAL THERAPY | Facility: HOME HEALTHCARE | Age: 56
End: 2021-05-10
Payer: COMMERCIAL

## 2021-05-10 DIAGNOSIS — M54.2 NECK PAIN: Primary | ICD-10-CM

## 2021-05-10 PROCEDURE — 97012 MECHANICAL TRACTION THERAPY: CPT | Performed by: PHYSICAL THERAPIST

## 2021-05-10 PROCEDURE — 97110 THERAPEUTIC EXERCISES: CPT | Performed by: PHYSICAL THERAPIST

## 2021-05-10 PROCEDURE — 97112 NEUROMUSCULAR REEDUCATION: CPT | Performed by: PHYSICAL THERAPIST

## 2021-05-10 NOTE — PROGRESS NOTES
Daily Note     Today's date: 5/10/2021  Patient name: Yanci Omalley  : 1965  MRN: [id number]  Referring provider: RENEE Hudson  Dx:   Encounter Diagnosis     ICD-10-CM    1  Neck pain  M54 2        Start Time: 1430          Subjective: I am having nerve pain of 6/10 in R sh and arm  Pt states that she is not sure if the traction is helping or causing her more pain  Objective: See treatment diary below    Assessment: Pt with increased pain with all activities during session, most notable with OH shoulder elevation activities  Radiating pain into R UE present throughout session; no changes with mechanical traction to end  Plan: Continue per plan of care        Re-eval Date: 6/3/21    Date 5/3 5/6/21 5-10-21     Visit Count 1 2 3     FOTO Completed             Precautions: none       Manuals 5/3 5/6/21 5-10-21     Mechanical Traction   10 min   10 lb max  0 lb min  6 steps   10 min   10 lb max  0 lb min  6 step                              Neuro Re-Ed         MTP/LTP  Green 1 x 15 ea  Green x 15 ea      Tband ER julissa   1 x 15 red  Green x 10      Wall angels   1 x 10  1 x 10      Ball on wall                                 Ther Ex        UBE - alt  10 minutes  10 min  10 min      Scapular retractions   1 x 20  1 x 20      Shld FF/Abd  1 x 10 ea  1 x 15 ea      CS retractions   5" x 10  5" x 10      CS isometrics  5" x 5 ea       SA punches         Supine ABCs        Doorway stretch        Prone flex/HA/ext                        Ther Activity                        Gait Training                        Modalities        MHP prn

## 2021-05-11 ENCOUNTER — OFFICE VISIT (OUTPATIENT)
Dept: PHYSICAL THERAPY | Facility: HOME HEALTHCARE | Age: 56
End: 2021-05-11
Payer: COMMERCIAL

## 2021-05-11 DIAGNOSIS — M54.2 NECK PAIN: Primary | ICD-10-CM

## 2021-05-11 PROCEDURE — 97112 NEUROMUSCULAR REEDUCATION: CPT

## 2021-05-11 PROCEDURE — 97110 THERAPEUTIC EXERCISES: CPT

## 2021-05-11 PROCEDURE — 97012 MECHANICAL TRACTION THERAPY: CPT

## 2021-05-11 NOTE — PROGRESS NOTES
Daily Note     Today's date: 2021  Patient name: Sarah Keyes  : 1965  MRN: [id number]  Referring provider: RENEE De León  Dx: No diagnosis found  Start Time: 915          Subjective: Last night I didn't take my Neurontin medication  I took Tylenol Arthritis instead and had the worst night ever  I don't think the ex and traction are helping al all but I will continue just so I can say I did the PT  Objective: See treatment diary below    Assessment: Tolerated treatment fair  Pt reports average pain of 3-4/10 t/o session and same pain at end of Tx  Pt did report increased short term R UE pain with initial position of supine  Patient would benefit from continued PT    Plan: Continue per plan of care        Re-eval Date: 6/3/21    Date 5/3 5/6/21 5-10-21 5-11-21    Visit Count 1 2 3 4    FOTO Completed             Precautions: none       Manuals 5/3 5/6/21 5-10-21 5-11-21    Mechanical Traction   10 min   10 lb max  0 lb min  6 steps   10 min   10 lb max  0 lb min  6 step  10'  10# max  0# min  6 step                              Neuro Re-Ed         MTP/LTP  Green 1 x 15 ea  Green x 15 ea  Green x 15 ea    Tband ER julissa   1 x 15 red  Green x 10  Green x 10    Wall angels   1 x 10  1 x 10  1 x 10    Ball on wall                                 Ther Ex        UBE - alt  10 minutes  10 min  10 min  10'    Scapular retractions   1 x 20  1 x 20  1 x 20    Shld FF/Abd  1 x 10 ea  1 x 15 ea  1 x 15    CS retractions   5" x 10  5" x 10  5" x 10    CS isometrics  5" x 5 ea       SA punches         Supine ABCs        Doorway stretch        Prone flex/HA/ext                        Ther Activity                        Gait Training                        Modalities        MHP prn

## 2021-05-13 ENCOUNTER — APPOINTMENT (OUTPATIENT)
Dept: PHYSICAL THERAPY | Facility: HOME HEALTHCARE | Age: 56
End: 2021-05-13
Payer: COMMERCIAL

## 2021-05-18 ENCOUNTER — OFFICE VISIT (OUTPATIENT)
Dept: PHYSICAL THERAPY | Facility: HOME HEALTHCARE | Age: 56
End: 2021-05-18
Payer: COMMERCIAL

## 2021-05-18 DIAGNOSIS — M54.2 NECK PAIN: Primary | ICD-10-CM

## 2021-05-18 PROCEDURE — 97110 THERAPEUTIC EXERCISES: CPT

## 2021-05-18 PROCEDURE — 97112 NEUROMUSCULAR REEDUCATION: CPT

## 2021-05-18 PROCEDURE — 97012 MECHANICAL TRACTION THERAPY: CPT

## 2021-05-20 ENCOUNTER — APPOINTMENT (OUTPATIENT)
Dept: PHYSICAL THERAPY | Facility: HOME HEALTHCARE | Age: 56
End: 2021-05-20
Payer: COMMERCIAL

## 2021-05-21 ENCOUNTER — OFFICE VISIT (OUTPATIENT)
Dept: PHYSICAL THERAPY | Facility: HOME HEALTHCARE | Age: 56
End: 2021-05-21
Payer: COMMERCIAL

## 2021-05-21 DIAGNOSIS — M54.2 NECK PAIN: Primary | ICD-10-CM

## 2021-05-21 PROCEDURE — 97012 MECHANICAL TRACTION THERAPY: CPT

## 2021-05-21 PROCEDURE — 97110 THERAPEUTIC EXERCISES: CPT

## 2021-05-21 PROCEDURE — 97112 NEUROMUSCULAR REEDUCATION: CPT

## 2021-05-21 NOTE — PROGRESS NOTES
Daily Note     Today's date: 2021  Patient name: Katina Mix  : 1965  MRN: [id number]  Referring provider: RENEE Olvera  Dx:   Encounter Diagnosis     ICD-10-CM    1  Neck pain  M54 2        Start Time: 910          Subjective: I have been pain for for 8 days now and sleeping like a baby  Objective: See treatment diary below    Assessment: Tolerated treatment well  VC's needed for correct head alignment and posture during cerv retractions, ROM and isometrics  Had pt perform ex with mirror as visual for correct form  Pt reports some muscle fatigue with wall angels and T-band Julissa ER  Pt denied cerv and R UE pain at end of Tx  Patient would benefit from continued PT    Plan: Continue per plan of care        Re-eval Date: 6/3/21    Date 5-21-21 5/6/21 5-10-21 5-11-21 5-18-21   Visit Count 6 2 3 4 5   FOTO Completed             Precautions: none       Manuals 5-21-21 5/6/21 5-10-21 5-11-21 5-18-21   Mechanical Traction  10'  10# max  0# min  6 steps 10 min   10 lb max  0 lb min  6 steps   10 min   10 lb max  0 lb min  6 step  10'  10# max  0# min  6 step   10'  10# max  0# min  6 steps                           Neuro Re-Ed         MTP/LTP Green x 20 Green 1 x 15 ea  Green x 15 ea  Green x 15 ea Green x 15 ea   Tband ER julissa  Red x 20 1 x 15 red  Green x 10  Green x 10 Green x 15   Wall angels  1 x 10 1 x 10  1 x 10  1 x 10 1 x 10   Ball on wall  Up/down  S/s  1 x 10 ea julissa    Up/down  Side/side  1 x 10 ea julissa                           Ther Ex 21       UBE - alt  10' 10 min  10 min  10' 10'   Scapular retractions  1 x 20 1 x 20  1 x 20  1 x 20 1 x 20   Shld FF/Abd 1 x 15 1 x 10 ea  1 x 15 ea  1 x 15 1 x 15   CS retractions  5" x 10 5" x 10  5" x 10  5" x 10 5" x 10   CS isometrics 5" x 10 5" x 5 ea    5" x 10   CS AROM 1 x 10 ea    1 x 10 ea   SA punches         Supine ABCs        Doorway stretch        Prone flex/HA/ext                        Ther Activity                        Gait Training                        Modalities        MHP prn

## 2021-05-25 ENCOUNTER — OFFICE VISIT (OUTPATIENT)
Dept: PHYSICAL THERAPY | Facility: HOME HEALTHCARE | Age: 56
End: 2021-05-25
Payer: COMMERCIAL

## 2021-05-25 DIAGNOSIS — M54.2 NECK PAIN: Primary | ICD-10-CM

## 2021-05-25 PROCEDURE — 97012 MECHANICAL TRACTION THERAPY: CPT

## 2021-05-25 PROCEDURE — 97110 THERAPEUTIC EXERCISES: CPT

## 2021-05-25 PROCEDURE — 97112 NEUROMUSCULAR REEDUCATION: CPT

## 2021-05-25 NOTE — PROGRESS NOTES
Daily Note     Today's date: 2021  Patient name: Sarah Keyes  : 1965  MRN: [id number]  Referring provider: RENEE De León  Dx: No diagnosis found  Subjective: Pain at neck and R sh of 3/10  No pain radiating into Biceps since start of traction  Objective: See treatment diary below    Assessment: Tolerated treatment well  Visual and VC's needed for posture and correct form t/o ex  Pt reports wall angels to be most difficult ex  She reports feeling well and denied pain at end with cerv traction  Patient would benefit from continued PT    Plan: Continue per plan of care        Re-eval Date: 6/3/21    Date 5-21-21 5-25-21 5-10-21 5-11-21 5-18-21   Visit Count 6 7 3 4 5   FOTO Completed             Precautions: none       Manuals 5-21-21 5-25-21 5-10-21 5-11-21 5-18-21   Mechanical Traction  10'  10# max  0# min  6 steps 10 min   10# max  0# min  6 steps   10 min   10 lb max  0 lb min  6 step  10'  10# max  0# min  6 step   10'  10# max  0# min  6 steps                           Neuro Re-Ed         MTP/LTP Green x 20 Green 1 x 15 ea  Green x 15 ea  Green x 15 ea Green x 15 ea   Tband ER sen  Red x 20 1 x 15  Green x 10  Green x 10 Green x 15   Wall angels  1 x 10 1 x 10  1 x 10  1 x 10 1 x 10   Ball on wall  Up/down  S/s  1 x 10 ea sen Up/down  S/S  1 x 10 ea Sen   Up/down  Side/side  1 x 10 ea sen                           Ther Ex 21      UBE - alt  10' 10' 10 min  10' 10'   Scapular retractions  1 x 20 1 x 20  1 x 20  1 x 20 1 x 20   Shld FF/Abd 1 x 15 1 x 15 ea  1 x 15 ea  1 x 15 1 x 15   CS retractions  5" x 10 5" x 10  5" x 10  5" x 10 5" x 10   CS isometrics 5" x 10 5" x 10 ea    5" x 10   CS AROM 1 x 10 ea 1 x 10 ea   1 x 10 ea   SA punches         Supine ABCs        Doorway stretch        Prone flex/HA/ext                        Ther Activity                        Gait Training                        Modalities        MHP prn

## 2021-05-27 ENCOUNTER — OFFICE VISIT (OUTPATIENT)
Dept: PHYSICAL THERAPY | Facility: HOME HEALTHCARE | Age: 56
End: 2021-05-27
Payer: COMMERCIAL

## 2021-05-27 DIAGNOSIS — M54.2 NECK PAIN: Primary | ICD-10-CM

## 2021-05-27 PROCEDURE — 97112 NEUROMUSCULAR REEDUCATION: CPT

## 2021-05-27 PROCEDURE — 97012 MECHANICAL TRACTION THERAPY: CPT

## 2021-05-27 PROCEDURE — 97110 THERAPEUTIC EXERCISES: CPT

## 2021-05-27 NOTE — PROGRESS NOTES
Daily Note     Today's date: 2021  Patient name: Chevy Romero  : 1965  MRN: [id number]  Referring provider: RENEE Beckett  Dx: No diagnosis found  Subjective: I had pain in my arm for about 20" after last visit when I went home and layed down  The pain went away when I sat up  Objective: See treatment diary below    Assessment: Tolerated treatment well  Improved postural awareness with less VC's needed  Added prone and supine ex as per flow sheet with good angelica  She did report some difficulty/fatigue during prone 90/90  No adverse reaction with all other ex and traction  Patient would benefit from continued PT    Plan: Continue per plan of care        Re-eval Date: 6/3/21    Date 21   Visit Count 6 7 8 4 5   FOTO Completed             Precautions: none       Manuals 21   Mechanical Traction  10'  10# max  0# min  6 steps 10 min   10# max  0# min  6 steps   10 min   10 lb max  0 lb min  6 step  10'  10# max  0# min  6 step   10'  10# max  0# min  6 steps                           Neuro Re-Ed    21     MTP/LTP Green x 20 Green 1 x 15 ea  Green x 15 ea  Green x 15 ea Green x 15 ea   Tband ER julissa  Red x 20 Red 1 x 15  Red  x 15  Green x 10 Green x 15   Wall angels  1 x 10 1 x 10  1 x 10  1 x 10 1 x 10   Ball on wall  Up/down  S/s  1 x 10 ea julissa Up/down  S/S  1 x 10 ea Julissa Up/down  Side/side  1 x 10 ea   Up/down  Side/side  1 x 10 ea julissa                           Ther Ex 21     UBE - alt  10' 10' 10'  10' 10'   Scapular retractions  1 x 20 1 x 20  1 x 20  1 x 20 1 x 20   Shld FF/Abd 1 x 15 1 x 15 ea  1 x 15 ea  1 x 15 1 x 15   CS retractions  5" x 10 5" x 10  5" x 10  5" x 10 5" x 10   CS isometrics 5" x 10 5" x 10 ea  5" x 10 ea  5" x 10   CS AROM 1 x 10 ea 1 x 10 ea 1 x 10 ea  1 x 10 ea   SA punches    1 x 10     Supine ABCs        Doorway stretch        Prone  flex/HA/ext Prone Sen UE ext from table   1 x 10     Prone 90/90   1 x 10     Ther Activity                        Gait Training                        Modalities        MHP prn

## 2021-06-01 ENCOUNTER — OFFICE VISIT (OUTPATIENT)
Dept: PHYSICAL THERAPY | Facility: HOME HEALTHCARE | Age: 56
End: 2021-06-01
Payer: COMMERCIAL

## 2021-06-01 DIAGNOSIS — M54.2 NECK PAIN: Primary | ICD-10-CM

## 2021-06-01 PROCEDURE — 97112 NEUROMUSCULAR REEDUCATION: CPT

## 2021-06-01 PROCEDURE — 97110 THERAPEUTIC EXERCISES: CPT

## 2021-06-01 PROCEDURE — 97012 MECHANICAL TRACTION THERAPY: CPT

## 2021-06-01 NOTE — PROGRESS NOTES
Daily Note     Today's date: 2021  Patient name: Aurelia Ball  : 1965  MRN: [id number]  Referring provider: RENEE Christian  Dx: No diagnosis found  Start Time: 915          Subjective: I had pain in my R upper arm on Saturday that made me want to cry  No significant improvements from the traction noticed lately and I think I want to hold PT for a while  I am not sure if the arm pain is coming from my neck or from my sh      Objective: See treatment diary below    Assessment: Tolerated treatment well  Pt denied increase in cerv pain t/o session but did report some intermittent B sh and R UE pain with various ex  Improved ROM noted with wall angels  Pt reports feeling well at end with cerv traction  Patient would benefit from continued PT    Plan: Continue per plan of care  Pt requests to have PT on hold after NV         Re-eval Date: 6/3/21    Date 21   Visit Count 6 7 8 9 5   FOTO Completed             Precautions: none       Manuals 21   Mechanical Traction  10'  10# max  0# min  6 steps 10 min   10# max  0# min  6 steps   10 min   10 lb max  0 lb min  6 step  10'  10# max  0# min  6 step   10'  10# max  0# min  6 steps                           Neuro Re-Ed    21    MTP/LTP Green x 20 Green 1 x 15 ea  Green x 15 ea  Green x 15 ea Green x 15 ea   Tband ER sen  Red x 20 Red 1 x 15  Red  x 15  Red x 15 Green x 15   Wall angels  1 x 10 1 x 10  1 x 10  1 x 10 1 x 10   Ball on wall  Up/down  S/s  1 x 10 ea sen Up/down  S/S  1 x 10 ea Sen Up/down  Side/side  1 x 10 ea  Up/down  Side/side  1 x 10 ea Up/down  Side/side  1 x 10 ea sen                           Ther Ex 21    UBE - alt  10' 10' 10'  10' 10'   Scapular retractions  1 x 20 1 x 20  1 x 20  1 x 20 1 x 20   Shld FF/Abd 1 x 15 1 x 15 ea  1 x 15 ea  1 x 15 1 x 15   CS retractions  5" x 10 5" x 10  5" x 10  5" x 10 5" x 10   CS isometrics 5" x 10 5" x 10 ea  5" x 10 ea 5" x 10 ea 5" x 10   CS AROM 1 x 10 ea 1 x 10 ea 1 x 10 ea 1 x 10 1 x 10 ea   SA punches    1 x 10 1  x10    Supine ABCs        Doorway stretch        Prone  flex/HA/ext        Prone Sen UE ext from table   T & V    1 x 10 ea T & V  1 x 10     Prone 90/90   1 x 10 1 x 10    Ther Activity                        Gait Training                        Modalities        MHP prn

## 2021-06-02 DIAGNOSIS — F32.A DEPRESSION, UNSPECIFIED DEPRESSION TYPE: ICD-10-CM

## 2021-06-02 RX ORDER — DULOXETIN HYDROCHLORIDE 30 MG/1
CAPSULE, DELAYED RELEASE ORAL
Qty: 90 CAPSULE | Refills: 2 | Status: SHIPPED | OUTPATIENT
Start: 2021-06-02 | End: 2022-03-02

## 2021-06-03 ENCOUNTER — OFFICE VISIT (OUTPATIENT)
Dept: PHYSICAL THERAPY | Facility: HOME HEALTHCARE | Age: 56
End: 2021-06-03
Payer: COMMERCIAL

## 2021-06-03 ENCOUNTER — TELEPHONE (OUTPATIENT)
Dept: SLEEP CENTER | Facility: CLINIC | Age: 56
End: 2021-06-03

## 2021-06-03 DIAGNOSIS — M54.2 NECK PAIN: Primary | ICD-10-CM

## 2021-06-03 PROCEDURE — 97110 THERAPEUTIC EXERCISES: CPT

## 2021-06-03 PROCEDURE — 97112 NEUROMUSCULAR REEDUCATION: CPT

## 2021-06-03 PROCEDURE — 97012 MECHANICAL TRACTION THERAPY: CPT

## 2021-06-03 NOTE — PROGRESS NOTES
Daily Note     Today's date: 6/3/2021  Patient name: Ángel Mccoy  : 1965  MRN: [id number]  Referring provider: RENEE Barker  Dx:   Encounter Diagnosis     ICD-10-CM    1  Neck pain  M54 2               Subjective: Pt reports she has some pain in her R UE today and a little in her neck  Objective: See treatment diary below      Assessment: Tolerated treatment well  No increased pain reported t/o session  See re-eval and DC summary today dated 6/3/21 for assessment       Plan: Pt to be DC from PT        Re-eval Date: 6/3/21    Date 5-21-21 5-25-21 5-27-21 6-1--21 6/3/21   Visit Count 6 7 8 9 10   FOTO Completed    Completed          Precautions: none       Manuals 5-21-21 5-25-21 5-27-21 6-1-21 6/3/21   Mechanical Traction  10'  10# max  0# min  6 steps 10 min   10# max  0# min  6 steps   10 min   10 lb max  0 lb min  6 step  10'  10# max  0# min  6 step   10'  10# max  0# min  6 steps                           Neuro Re-Ed    21    MTP/LTP Green x 20 Green 1 x 15 ea  Green x 15 ea  Green x 15 ea Green x 15 ea   Tband ER sen  Red x 20 Red 1 x 15  Red  x 15  Red x 15 Red  x 15   Wall angels  1 x 10 1 x 10  1 x 10  1 x 10 1 x 10   Ball on wall  Up/down  S/s  1 x 10 ea sen Up/down  S/S  1 x 10 ea Sen Up/down  Side/side  1 x 10 ea  Up/down  Side/side  1 x 10 ea Up/down  Side/side  1 x 10 ea sen                           Ther Ex 21    UBE - alt  10' 10' 10'  10' 10'   Scapular retractions  1 x 20 1 x 20  1 x 20  1 x 20 1 x 20   Shld FF/Abd 1 x 15 1 x 15 ea  1 x 15 ea  1 x 15 1 x 15   CS retractions  5" x 10 5" x 10  5" x 10  5" x 10 5" x 10   CS isometrics 5" x 10 5" x 10 ea  5" x 10 ea 5" x 10 ea 5" x 10   CS AROM 1 x 10 ea 1 x 10 ea 1 x 10 ea 1 x 10 1 x 10 ea   SA punches    1 x 10 1  x10 1 x 10    Supine ABCs        Doorway stretch        Prone  flex/HA/ext        Prone Sen UE ext from table   T & V    1 x 10 ea T & V  1 x 10  T & V  1 x 10    Prone 90/90 1 x 10 1 x 10 1 x 10    Ther Activity                        Gait Training                        Modalities        MHP prn

## 2021-06-03 NOTE — TELEPHONE ENCOUNTER
Patient had called the office and left message on machine stating that she would like an appointment ASAP with us  I called and left message for the patient to call the office back regarding that appointment

## 2021-06-07 NOTE — PROGRESS NOTES
PT Discharge    Today's date: 2021  Patient name: Carson Saunders  : 1965  MRN: [id number]  Referring provider: RENEE Barlow  Dx:   Encounter Diagnosis     ICD-10-CM    1  Neck pain  M54 2        Start Time: 915  Stop Time:   Total time in clinic (min): 55 minutes    Assessment  Assessment details: Pt Carson Saunders is a 50 y o  who presents to [facility] with s/s consistent with chronic cervical pain 2* to DDD/DJD  Pt presents with limited cervical mobility, limited cervical and B UE strength, impaired soft tissue mobility, decreased postural awareness, radiating symptoms R > L UE, and pain with functional activities  Pt notes that she is most limited with reaching OH, lifting/carrying, and frequently dropping objects as she is R hand dominate  She also has difficulty with dressing, completion of household chores, and disrupted sleeping due to increased pain  Pt would benefit from skilled therapy services to address outlined impairments, work towards goals, and restore pts PLOF  Thank you! UPDATE: Pt reporting that she was seeing an improvement with therapy and was feeling better  She did continue to have some radiating pain intermittently but will be returning to see MD to discuss possibility of getting injections  She will be D/C from OPPT at this time  Thank you for this referral    Impairments: activity intolerance, impaired physical strength, pain with function, weight-bearing intolerance and poor posture   Understanding of Dx/Px/POC: fair   Prognosis: fair    Goals  STGs to be achieved in 4 weeks:  -Pt to demonstrate reduced subjective pain rating "at worst" by at least 2-3 points from Initial Eval to allow for reduced pain with ADLs and improved functional activity tolerance  MET  -Pt to demonstrate ROM improved in C/S by 5* in order to maximize joint mobility and function and allow for progression of exercise program and achievement of goals   MET  -Pt to demonstrate increased MMT of B UE by at least 1/2 grade in order to improve safety and stability with ADLs and functional mobility  MET    LTGs to be achieved upon discharge:   -Pt will be I with HEP in order to continue to improve quality of life and independence and reduce risk for re-injury  MET  -Pt to demonstrate return to activities of daily living without limiations or restrictions  - ongoing   -Pt will return to lifting >10# to help facilitate return to household activities independently - ongoing   -Pt to demonstrate ability to lay supine > 60 minutes for improved tolerance to sleeping - ongoing   -Pt to demonstrate improved function as noted by achieving or exceeding predicted score on FOTO outcomes assessment tool  - MET      Plan  Plan details: Pt to be DC from OPPT   Treatment plan discussed with: patient        Subjective Evaluation    History of Present Illness  Mechanism of injury: Pt reporting that she has bee having neck pain for 10+ years  She has been seeing pain management consistently and notes recent x-rays taken which were (+) for arthritis  MD wants patient to try therapy for traction but pt voiced concerned about it flaring up her pain too much  She will return to MD as needed at this time  Quality of life: good    Pain  At best pain rating: 3  At worst pain rating: 10  Quality: dull ache, sharp, radiating and tight  Relieving factors: heat and rest  Aggravating factors: lifting, overhead activity and keyboarding  Progression: worsening    Hand dominance: right      Diagnostic Tests  X-ray: abnormal  Treatments  Previous treatment: physical therapy  Current treatment: physical therapy  Patient Goals  Patient goals for therapy: decreased edema, decreased pain, improved balance, increased motion, increased strength and independence with ADLs/IADLs          Objective     Concurrent Complaints  Positive for disturbed sleep and headaches       Postural Observations  Seated posture: fair  Standing posture: fair    Additional Postural Observation Details  Forward head posture   Bilateral rounded shoulders    Neurological Testing     Sensation   Cervical/Thoracic   Left   Intact: light touch    Right   Intact: light touch    Active Range of Motion   Cervical/Thoracic Spine       Cervical    Flexion: 35 degrees  with pain  Extension: 35 degrees     with pain  Left lateral flexion: 30 degrees     with pain  Right lateral flexion: 30 degrees     with pain  Left rotation: 40 degrees  Right rotation: 40 degrees             General Comments:      Shoulder Comments   B UE ROM grossly WFL (+) painful movement '    B UE MMT grossly 3+/4-    Cervical/Thoracic Comments  Dropping objects frequently with R hand   Difficulty with lifting < 10# and avoids lifting as able   Difficulty with washing/dressing   Disrupted sleeping  Neuro Exam:     Headaches   Patient reports headaches: Yes

## 2021-06-11 DIAGNOSIS — M54.12 CERVICAL RADICULOPATHY: ICD-10-CM

## 2021-06-11 RX ORDER — GABAPENTIN 300 MG/1
CAPSULE ORAL
Qty: 180 CAPSULE | Refills: 0 | Status: SHIPPED | OUTPATIENT
Start: 2021-06-11 | End: 2021-06-16 | Stop reason: SDUPTHER

## 2021-06-16 ENCOUNTER — TELEPHONE (OUTPATIENT)
Dept: FAMILY MEDICINE CLINIC | Facility: CLINIC | Age: 56
End: 2021-06-16

## 2021-06-16 DIAGNOSIS — M54.12 CERVICAL RADICULOPATHY: ICD-10-CM

## 2021-06-16 RX ORDER — GABAPENTIN 300 MG/1
300 CAPSULE ORAL 3 TIMES DAILY
Qty: 90 CAPSULE | Refills: 2 | Status: SHIPPED | OUTPATIENT
Start: 2021-06-16 | End: 2021-06-28 | Stop reason: SDUPTHER

## 2021-06-16 NOTE — TELEPHONE ENCOUNTER
Patient left message stating you gave her Gabapentin 300mg on June 11th but pharmacy does not want to give it to her because they say she still has some left in an old prescription  Said she spoke to [facility] regarding a pain increase and said she was taking more capsules at night when she sleeps  So instead of taking twice a day she was taking it three times a day and needs the new order sent to pharmacy explaining this

## 2021-06-25 ENCOUNTER — RA CDI HCC (OUTPATIENT)
Dept: OTHER | Facility: HOSPITAL | Age: 56
End: 2021-06-25

## 2021-06-25 NOTE — PROGRESS NOTES
[facility] [employer] [facility]  coding opportunities             Chart reviewed, (number of) suggestions sent to provider: 1               Number of suggestions NOT actually used: 1     Patients insurance company: [employer] (Medicare Advantage and Commercial)     Visit status: Patient arrived for their scheduled appointment     Provider never responded to [place] [id number]  coding request     [place] [address]  coding opportunities        Can the depression be further specified as:     F32 0 major depressive disorder, single episode, mild  OR   F32 1 major depressive disorder, single episode, moderate  OR   F32 2 major depressive disorder, single episode, severe without psychotic features OR   F32 4 major depressive disorder, single episode, in partial remission OR   F32 5 major depressive disorder, single episode, in full remission     Chart reviewed, (number of) suggestions sent to provider: 1                  Patients insurance company: [employer] (Medicare Advantage and [employer])

## 2021-06-28 ENCOUNTER — OFFICE VISIT (OUTPATIENT)
Dept: FAMILY MEDICINE CLINIC | Facility: CLINIC | Age: 56
End: 2021-06-28
Payer: COMMERCIAL

## 2021-06-28 VITALS
SYSTOLIC BLOOD PRESSURE: 108 MMHG | HEART RATE: 88 BPM | DIASTOLIC BLOOD PRESSURE: 78 MMHG | RESPIRATION RATE: 18 BRPM | HEIGHT: 62 IN | WEIGHT: 104 LBS | OXYGEN SATURATION: 99 % | TEMPERATURE: 97.8 F | BODY MASS INDEX: 19.14 KG/M2

## 2021-06-28 DIAGNOSIS — M50.30 DDD (DEGENERATIVE DISC DISEASE), CERVICAL: ICD-10-CM

## 2021-06-28 DIAGNOSIS — M54.12 CERVICAL RADICULOPATHY: Primary | ICD-10-CM

## 2021-06-28 PROCEDURE — 3008F BODY MASS INDEX DOCD: CPT | Performed by: OTOLARYNGOLOGY

## 2021-06-28 PROCEDURE — 99213 OFFICE O/P EST LOW 20 MIN: CPT | Performed by: FAMILY MEDICINE

## 2021-06-28 RX ORDER — GABAPENTIN 300 MG/1
300 CAPSULE ORAL 3 TIMES DAILY
Qty: 270 CAPSULE | Refills: 1 | Status: SHIPPED | OUTPATIENT
Start: 2021-06-28

## 2021-06-28 NOTE — PROGRESS NOTES
Assessment/Plan:     Diagnoses and all orders for this visit:    Cervical radiculopathy  -     gabapentin (NEURONTIN) 300 mg capsule; Take 1 capsule (300 mg total) by mouth 3 (three) times a day  -     Ambulatory referral to Pain Management; Future    DDD (degenerative disc disease), cervical  -     gabapentin (NEURONTIN) 300 mg capsule; Take 1 capsule (300 mg total) by mouth 3 (three) times a day  -     Ambulatory referral to Pain Management; Future        - Will continue current pain regimen  We discussed that she would require a cervical MRI prior to establishing with neurosurgery for her neck, however, patient is not interested in surgical management at this time  Referral was provided to Dr Kel[facility] for follow up and further discussion  Return for [facility] Annual Wellness Visit  Subjective:        Patient ID: Aurelia Ball is a 50 y o  female  Chief Complaint   Patient presents with   Jairo Heath is a 50year old right handed female with history of chronic back and neck pain and fibromyalgia, presenting with concern for right shoulder pain  I last saw this patient on 1/21 for this concern, at which time I referred her to PT and ortho for follow up  She was seen by ortho on 1/28 at which time shoulder xray revealed severe osteoarthritis of the glenohumeral joint and she received a right shoulder glenohumeral injection by Dr Vince Mo  Patient was seen for follow up in our [address] [place] [address] office 4/1 where RF, sed rate, and CRP were ordered and WNL  Patient had follow up with Dr Masood Cabello on 4/7 at which time cervical xray revealed severe degenerative disc space narrowing at C3-C4, C4-C5, C5-C6, and C6-C7  It was recommended she start PT and follow up with her pain management doctor in [facility]  To date, patient has completed 10 PT sessions with minimal improvement of her pain    Pain is currently managed with gabapentin 300 mg TID, meloxicam 15 mg daily, Cymbalta 30 mg daily, and Zanaflex 2 mg PRN  Patient does follow with pain management, however, she is requesting a referral to a "spine doctor" today  She would like to avoid surgery on her neck but is concerned as the pain in her arm and shoulder has been making it difficult for her to sleep  She endorses pain radiating from her neck, down her right arm, worse when laying down  She denies weakness or numbness in her arms        The following portions of the patient's history were reviewed and updated as appropriate: allergies, current medications, past family history, past medical history, past social history, past surgical history and problem list     Patient Active Problem List   Diagnosis    Allergic rhinitis    Arthritis    Cervical radiculopathy    Neck pain    Chronic pain of right ankle    Depression    Esophageal reflux    Fatigue    Fibromyalgia    Herniated lumbar intervertebral disc    Hypercholesteremia    Hypocalcemia    Hyponatremia    Narcolepsy    Spondylosis of lumbosacral region without myelopathy or radiculopathy    Sinus bradycardia    Primary localized osteoarthrosis of the hip    Vitamin D deficiency    Multiple acquired skin tags    Skin lesion of face  left preauricular    DDD (degenerative disc disease), cervical       Current Outpatient Medications   Medication Sig Dispense Refill    Calcium Carbonate-Vitamin D (CALCIUM 500 + D PO) Take 1 tablet by mouth daily      DULoxetine (CYMBALTA) 30 mg delayed release capsule take 1 capsule by mouth once daily 90 capsule 2    GLUCOSAMINE-CHONDROITIN PO Take 2 caplets by mouth daily      ketoconazole (NIZORAL) 2 % cream APPLY TWICE A DAY FOR 2 WEEKS AS NEEDED FOR ITCHING      lidocaine (LMX) 4 % cream Apply topically as needed for mild pain 30 g 0    meloxicam (MOBIC) 15 mg tablet take 1 tablet by mouth once daily 90 tablet 2    multivitamin (THERAGRAN) TABS Take 1 tablet by mouth daily      polyethylene glycol (GLYCOLAX) powder Take 17 g by mouth 2 (two) times a day 578 g 3    tiZANidine (ZANAFLEX) 2 mg tablet Take 2 mg by mouth as needed for muscle spasms       gabapentin (NEURONTIN) 300 mg capsule Take 1 capsule (300 mg total) by mouth 3 (three) times a day 270 capsule 1     No current facility-administered medications for this visit  Past Medical History:   Diagnosis Date    Anxiety     Back problem     Shoulder pain         Past Surgical History:   Procedure Laterality Date    BREAST BIOPSY Right 2005    COLONOSCOPY      DILATION AND CURETTAGE OF UTERUS      ENDOMETRIAL ABLATION      LEG SURGERY Left     LEG SURGERY      SHOULDER ARTHROSCOPY Right     shoulder        Social History     Socioeconomic History    Marital status: Single     Spouse name: Not on file    Number of children: Not on file    Years of education: Not on file    Highest education level: Not on file   Occupational History    Not on file   Tobacco Use    Smoking status: Former Smoker     Types: Cigarettes     Quit date: 1991     Years since quittin 4    Smokeless tobacco: Never Used   Vaping Use    Vaping Use: Never used   Substance and Sexual Activity    Alcohol use: Yes     Alcohol/week: 7 0 standard drinks     Types: 7 Glasses of wine per week     Comment: glass of wine nightly    Drug use: No    Sexual activity: Not Currently     Partners: Male   Other Topics Concern    Not on file   Social History Narrative    Not on file     Social Determinants of Health     Financial Resource Strain:     Difficulty of Paying Living Expenses:    Food Insecurity:     Worried About Running Out of Food in the Last Year:     Ran Out of Food in the Last Year:    Transportation Needs:     Lack of Transportation (Medical):      Lack of Transportation (Non-Medical):    Physical Activity:     Days of Exercise per Week:     Minutes of Exercise per Session:    Stress:     Feeling of Stress :    Social Connections:     Frequency of Communication with Friends and Family:     Frequency of Social Gatherings with Friends and Family:     Attends Bahai Services:     Active Member of Clubs or Organizations:     Attends Club or Organization Meetings:     Marital Status:    Intimate Partner Violence:     Fear of Current or Ex-Partner:     Emotionally Abused:     Physically Abused:     Sexually Abused:    Review of Systems   Constitutional: Negative for chills, diaphoresis and fever  Respiratory: Negative for cough, chest tightness, shortness of breath and wheezing  Cardiovascular: Negative for chest pain, palpitations and leg swelling  Gastrointestinal: Negative for abdominal pain, blood in stool, constipation, diarrhea, nausea and vomiting  Musculoskeletal: Positive for arthralgias, myalgias and neck pain  Skin: Negative for rash and wound  Neurological: Negative for dizziness, syncope, weakness, light-headedness and headaches  Objective:      /78 (BP Location: Left arm, Patient Position: Sitting, Cuff Size: Adult)   Pulse 88   Temp 97 8 °F (36 6 °C) (Tympanic)   Resp 18   Ht 5' 2" (1 575 m)   Wt 47 2 kg (104 lb)   SpO2 99%   BMI 19 02 kg/m²          Physical Exam  Vitals and nursing note reviewed  Constitutional:       General: She is not in acute distress  Appearance: Normal appearance  HENT:      Head: Normocephalic and atraumatic  Eyes:      Extraocular Movements: Extraocular movements intact  Conjunctiva/sclera: Conjunctivae normal       Pupils: Pupils are equal, round, and reactive to light  Cardiovascular:      Rate and Rhythm: Normal rate and regular rhythm  Heart sounds: Normal heart sounds  No murmur heard  Pulmonary:      Effort: Pulmonary effort is normal  No respiratory distress  Breath sounds: Normal breath sounds  No wheezing  Musculoskeletal:         General: No swelling, tenderness or deformity  Normal range of motion        Cervical back: Normal range of motion and neck supple  Right lower leg: No edema  Left lower leg: No edema  Skin:     General: Skin is warm and dry  Neurological:      General: No focal deficit present  Mental Status: She is alert and oriented to person, place, and time  Cranial Nerves: No cranial nerve deficit  Motor: No weakness     Psychiatric:         Mood and Affect: Mood normal          Behavior: Behavior normal

## 2021-07-14 ENCOUNTER — HOSPITAL ENCOUNTER (OUTPATIENT)
Dept: MRI IMAGING | Facility: HOSPITAL | Age: 56
Discharge: HOME | End: 2021-07-14
Payer: COMMERCIAL

## 2021-07-14 DIAGNOSIS — M50.30 DDD (DEGENERATIVE DISC DISEASE), CERVICAL: ICD-10-CM

## 2021-07-14 DIAGNOSIS — M54.10 BRACHIAL NEURITIS OF RIGHT UPPER EXTREMITY: ICD-10-CM

## 2021-07-14 DIAGNOSIS — M54.2 CERVICALGIA: ICD-10-CM

## 2021-07-14 PROCEDURE — 72141 MRI NECK SPINE W/O DYE: CPT

## 2021-07-14 PROCEDURE — G1004 CDSM NDSC: HCPCS

## 2021-07-23 ENCOUNTER — RA CDI HCC (OUTPATIENT)
Dept: OTHER | Facility: HOSPITAL | Age: 56
End: 2021-07-23

## 2021-07-23 NOTE — PROGRESS NOTES
[facility] [employer]  coding opportunities             Chart reviewed, (number of) suggestions sent to provider: 1               Number of suggestions NOT actually used: 1     Patients insurance company: [address]  (Medicare Advantage and Commercial)     Visit status: Patient arrived for their scheduled appointment     Provider never responded to [facility] [employer]  coding request     [place] [address]  coding opportunities        Can the depression be further specified as:     F32 0 major depressive disorder, single episode, mild  OR   F32 1 major depressive disorder, single episode, moderate  OR   F32 2 major depressive disorder, single episode, severe without psychotic features OR   F32 4 major depressive disorder, single episode, in partial remission OR   F32 5 major depressive disorder, single episode, in full remission       Inflammation of  Sacroiliac joint not supported in assessment of note    Chart reviewed, (number of) suggestions sent to provider: 1                  Patients insurance company: [address]  (Medicare Advantage and Commercial)

## 2021-07-26 ENCOUNTER — TELEPHONE (OUTPATIENT)
Dept: FAMILY MEDICINE CLINIC | Facility: CLINIC | Age: 56
End: 2021-07-26

## 2021-07-26 NOTE — TELEPHONE ENCOUNTER
Pt called and said you gave her a referral for [facility] but she cannot go there because she already goes to another pain management doctor and has been for ten years and does not want to leave him  [facility] told her that the options are to go to neurosurgery or ortho  She wants to go to an ortho doctor but said Bishop Loredo is no longer with [facility] and Dr Brianna Saba does not know what to do with her    She would like you to refer her to another ortho doctor and would like the name and referral

## 2021-07-26 NOTE — TELEPHONE ENCOUNTER
Dr Rodrigo CHAVEZ) and Dr Kate Davis [facility] are two of the [facility] [address] specialists for spine  Phone # [phone]

## 2021-07-28 RX ORDER — OXYCODONE HYDROCHLORIDE AND ACETAMINOPHEN 5; 325 MG/1; MG/1
TABLET ORAL
COMMUNITY
Start: 2021-04-17

## 2021-07-29 ENCOUNTER — OFFICE VISIT (OUTPATIENT)
Dept: FAMILY MEDICINE CLINIC | Facility: CLINIC | Age: 56
End: 2021-07-29
Payer: COMMERCIAL

## 2021-07-29 VITALS
WEIGHT: 106 LBS | HEIGHT: 62 IN | SYSTOLIC BLOOD PRESSURE: 110 MMHG | HEART RATE: 67 BPM | OXYGEN SATURATION: 92 % | DIASTOLIC BLOOD PRESSURE: 70 MMHG | TEMPERATURE: 97.3 F | BODY MASS INDEX: 19.51 KG/M2 | RESPIRATION RATE: 16 BRPM

## 2021-07-29 DIAGNOSIS — Z11.4 SCREENING FOR HIV WITHOUT PRESENCE OF RISK FACTORS: ICD-10-CM

## 2021-07-29 DIAGNOSIS — M54.12 CERVICAL RADICULOPATHY: ICD-10-CM

## 2021-07-29 DIAGNOSIS — E78.00 HYPERCHOLESTEREMIA: ICD-10-CM

## 2021-07-29 DIAGNOSIS — M50.30 DDD (DEGENERATIVE DISC DISEASE), CERVICAL: ICD-10-CM

## 2021-07-29 DIAGNOSIS — Z11.59 ENCOUNTER FOR HEPATITIS C SCREENING TEST FOR LOW RISK PATIENT: ICD-10-CM

## 2021-07-29 DIAGNOSIS — Z00.00 MEDICARE ANNUAL WELLNESS VISIT, SUBSEQUENT: Primary | ICD-10-CM

## 2021-07-29 PROCEDURE — G0439 PPPS, SUBSEQ VISIT: HCPCS | Performed by: FAMILY MEDICINE

## 2021-07-29 NOTE — PROGRESS NOTES
Assessment and Plan:     Problem List Items Addressed This Visit        Nervous and Auditory    Cervical radiculopathy    Relevant Orders    Ambulatory referral to Neurosurgery       Musculoskeletal and Integument    DDD (degenerative disc disease), cervical    Relevant Orders    Ambulatory referral to Neurosurgery       Other    Hypercholesteremia    Relevant Orders    Lipid panel      Other Visit Diagnoses     Medicare annual wellness visit, subsequent    -  Primary    Encounter for hepatitis C screening test for low risk patient        Relevant Orders    Hepatitis C antibody    Screening for HIV without presence of risk factors        Relevant Orders    HIV 1/2 ANTIGEN/ANTIBODY (4TH GENERATION) W REFLEX [facility]           Preventive health issues were discussed with patient, and age appropriate screening tests were ordered as noted in patient's After Visit Summary  Personalized health advice and appropriate referrals for health education or preventive services given if needed, as noted in patient's After Visit Summary       History of Present Illness:     Patient presents for Medicare Annual Wellness visit    Patient Care Team:  Xiomara Vigil PA-C as PCP - General (Family Medicine)  Sherif Larios MD     Problem List:     Patient Active Problem List   Diagnosis    Allergic rhinitis    Arthritis    Cervical radiculopathy    Neck pain    Chronic pain of right ankle    Depression    Esophageal reflux    Fatigue    Fibromyalgia    Herniated lumbar intervertebral disc    Hypercholesteremia    Hypocalcemia    Hyponatremia    Narcolepsy    Spondylosis of lumbosacral region without myelopathy or radiculopathy    Sinus bradycardia    Primary localized osteoarthrosis of the hip    Vitamin D deficiency    Multiple acquired skin tags    Skin lesion of face  left preauricular    DDD (degenerative disc disease), cervical      Past Medical and Surgical History:     Past Medical History:   Diagnosis Date    Anxiety     Back problem     Shoulder pain      Past Surgical History:   Procedure Laterality Date    BREAST BIOPSY Right 2005    COLONOSCOPY      DILATION AND CURETTAGE OF UTERUS      ENDOMETRIAL ABLATION      LEG SURGERY Left     LEG SURGERY      SHOULDER ARTHROSCOPY Right     shoulder      Family History:     Family History   Problem Relation Age of Onset    Colon cancer Paternal Grandmother     Breast cancer Paternal Grandmother 79    Arthritis Family     Diabetes Family     Hypertension Family     Osteoporosis Family     Ovarian cancer Family     Uterine cancer Family     No Known Problems Mother     Heart disease Father     Heart disease Paternal Grandfather     Diabetes Paternal Aunt     No Known Problems Paternal Aunt       Social History:     Social History     Socioeconomic History    Marital status: Single     Spouse name: None    Number of children: None    Years of education: None    Highest education level: None   Occupational History    None   Tobacco Use    Smoking status: Former Smoker     Types: Cigarettes     Quit date: 1991     Years since quittin 5    Smokeless tobacco: Never Used   Vaping Use    Vaping Use: Never used   Substance and Sexual Activity    Alcohol use: Yes     Alcohol/week: 7 0 standard drinks     Types: 7 Glasses of wine per week     Comment: glass of wine nightly    Drug use: No    Sexual activity: Not Currently     Partners: Male   Other Topics Concern    None   Social History Narrative    None     Social Determinants of Health     Financial Resource Strain:     Difficulty of Paying Living Expenses:    Food Insecurity:     Worried About Running Out of Food in the Last Year:     Ran Out of Food in the Last Year:    Transportation Needs:     Lack of Transportation (Medical):      Lack of Transportation (Non-Medical):    Physical Activity:     Days of Exercise per Week:     Minutes of Exercise per Session:    Stress:     Feeling of Stress :    Social Connections:     Frequency of Communication with Friends and Family:     Frequency of Social Gatherings with Friends and Family:     Attends Buddhism Services:     Active Member of Clubs or Organizations:     Attends Club or Organization Meetings:     Marital Status:    Intimate Partner Violence:     Fear of Current or Ex-Partner:     Emotionally Abused:     Physically Abused:     Sexually Abused:       Medications and Allergies:     Current Outpatient Medications   Medication Sig Dispense Refill    Calcium Carbonate-Vitamin D (CALCIUM 500 + D PO) Take 1 tablet by mouth daily      DULoxetine (CYMBALTA) 30 mg delayed release capsule take 1 capsule by mouth once daily 90 capsule 2    gabapentin (NEURONTIN) 300 mg capsule Take 1 capsule (300 mg total) by mouth 3 (three) times a day 270 capsule 1    GLUCOSAMINE-CHONDROITIN PO Take 2 caplets by mouth daily      ketoconazole (NIZORAL) 2 % cream APPLY TWICE A DAY FOR 2 WEEKS AS NEEDED FOR ITCHING      lidocaine (LMX) 4 % cream Apply topically as needed for mild pain 30 g 0    meloxicam (MOBIC) 15 mg tablet take 1 tablet by mouth once daily 90 tablet 2    multivitamin (THERAGRAN) TABS Take 1 tablet by mouth daily      oxyCODONE-acetaminophen (PERCOCET) 5-325 mg per tablet take 1 tablet by mouth every 6 hours if needed for severe pain MAX DAILY AMOUNT OF 4 TABLETS      polyethylene glycol (GLYCOLAX) powder Take 17 g by mouth 2 (two) times a day 578 g 3    tiZANidine (ZANAFLEX) 2 mg tablet Take 2 mg by mouth as needed for muscle spasms        No current facility-administered medications for this visit       Allergies   Allergen Reactions    Amitriptyline      Other reaction(s): Constipation, Dry mouth      Immunizations:     Immunization History   Administered Date(s) Administered    INFLUENZA 09/30/2014, 08/17/2015, 02/09/2020, 12/19/2020    Influenza Quadrivalent Preservative Free 3 years and older IM 09/30/2014    Influenza, injectable, quadrivalent, preservative free 0 5 mL 02/09/2020    Influenza, seasonal, injectable 08/12/2017, 02/09/2020    Influenza, seasonal, injectable, preservative free 08/26/2016    SARS-CoV-2 / COVID-19 mRNA IM (Moderna) 03/26/2021, 04/28/2021    Tdap 01/20/2014, 01/16/2017    influenza, injectable, quadrivalent 12/19/2020      Health Maintenance:         Topic Date Due    Hepatitis C Screening  Never done    HIV Screening  Never done    Cervical Cancer Screening  02/26/2018    Breast Cancer Screening: Mammogram  03/22/2023    Colorectal Cancer Screening  05/06/2030         Topic Date Due    Influenza Vaccine (1) 09/01/2021      Medicare Health Risk Assessment:     /70   Pulse 67   Temp (!) 97 3 °F (36 3 °C)   Resp 16   Ht 5' 2" (1 575 m)   Wt 48 1 kg (106 lb)   SpO2 92%   BMI 19 39 kg/m²      Marie  is here for her Subsequent Wellness visit  Health Risk Assessment:   Patient rates overall health as fair  Patient feels that their physical health rating is much worse  Patient is very satisfied with their life  Eyesight was rated as same  Hearing was rated as same  Patient feels that their emotional and mental health rating is same  Patients states they are never, rarely angry  Patient states they are sometimes unusually tired/fatigued  Pain experienced in the last 7 days has been a lot  Patient's pain rating has been 7/10  Patient states that she has experienced no weight loss or gain in last 6 months  Fall Risk Screening: In the past year, patient has experienced: no history of falling in past year      Urinary Incontinence Screening:   Patient has not leaked urine accidently in the last six months  Home Safety:  Patient does not have trouble with stairs inside or outside of their home  Patient has working smoke alarms and has working carbon monoxide detector  Home safety hazards include: none  Nutrition:   Current diet is Regular       Medications:   Patient is not currently taking any over-the-counter supplements  Patient is able to manage medications  Activities of Daily Living (ADLs)/Instrumental Activities of Daily Living (IADLs):   Walk and transfer into and out of bed and chair?: Yes  Dress and groom yourself?: Yes    Bathe or shower yourself?: Yes    Feed yourself? Yes  Do your laundry/housekeeping?: Yes  Manage your money, pay your bills and track your expenses?: Yes  Make your own meals?: Yes    Do your own shopping?: Yes    Previous Hospitalizations:   Any hospitalizations or ED visits within the last 12 months?: Yes    How many hospitalizations have you had in the last year?: 3-4    Hospitalization Comments: Ear pain 4/2021  Back pain 12/2020    Advance Care Planning:   Living will: No    Durable POA for healthcare: No    Advanced directive: No      PREVENTIVE SCREENINGS      Cardiovascular Screening:    General: History Lipid Disorder    Due for: Lipid Panel      Diabetes Screening:     General: Screening Current      Colorectal Cancer Screening:     General: Screening Current      Breast Cancer Screening:     General: Screening Current      Cervical Cancer Screening:    General: Risks and Benefits Discussed    Due for: Cervical Pap Smear      Osteoporosis Screening:    General: Screening Current      Abdominal Aortic Aneurysm (AAA) Screening:        General: Screening Not Indicated      Lung Cancer Screening:     General: Screening Not Indicated      Hepatitis C Screening:    General: Risks and Benefits Discussed    Screening, Brief Intervention, and Referral to Treatment (SBIRT)    Screening  Typical number of drinks in a day: 1  Typical number of drinks in a week: 7  Interpretation: Low risk drinking behavior      Single Item Drug Screening:  How often have you used an illegal drug (including marijuana) or a prescription medication for non-medical reasons in the past year? never    Single Item Drug Screen Score: 0  Interpretation: Negative screen for possible drug use disorder    Review of Current Opioid Use    Opioid Risk Tool (ORT) Interpretation: Complete Opioid Risk Tool (ORT)      Ayah Patino PA-C

## 2021-07-29 NOTE — PATIENT INSTRUCTIONS
Medicare Preventive Visit Patient Instructions  Thank you for completing your Welcome to Medicare Visit or Medicare Annual Wellness Visit today  Your next wellness visit will be due in one year (7/30/2022)  The screening/preventive services that you may require over the next 5-10 years are detailed below  Some tests may not apply to you based off risk factors and/or age  Screening tests ordered at today's visit but not completed yet may show as past due  Also, please note that scanned in results may not display below  Preventive Screenings:  Service Recommendations Previous Testing/Comments   Colorectal Cancer Screening  * Colonoscopy    * Fecal Occult Blood Test (FOBT)/Fecal Immunochemical Test (FIT)  * Fecal DNA/Cologuard Test  * Flexible Sigmoidoscopy Age: 54-65 years old   Colonoscopy: every 10 years (may be performed more frequently if at higher risk)  OR  FOBT/FIT: every 1 year  OR  Cologuard: every 3 years  OR  Sigmoidoscopy: every 5 years  Screening may be recommended earlier than age 48 if at higher risk for colorectal cancer  Also, an individualized decision between you and your healthcare provider will decide whether screening between the ages of 74-80 would be appropriate  Colonoscopy: 05/06/2020  FOBT/FIT: 11/26/2019  Cologuard: Not on file  Sigmoidoscopy: Not on file    Screening Current     Breast Cancer Screening Age: 36 years old  Frequency: every 1-2 years  Not required if history of left and right mastectomy Mammogram: 03/22/2021    Screening Current   Cervical Cancer Screening Between the ages of 21-29, pap smear recommended once every 3 years  Between the ages of 33-67, can perform pap smear with HPV co-testing every 5 years     Recommendations may differ for women with a history of total hysterectomy, cervical cancer, or abnormal pap smears in past  Pap Smear: 02/26/2015        Hepatitis C Screening Once for adults born between 1945 and 1965  More frequently in patients at high risk for Hepatitis C Hep C Antibody: Not on file        Diabetes Screening 1-2 times per year if you're at risk for diabetes or have pre-diabetes Fasting glucose: 86 mg/dL   A1C: 5 0 %    Screening Current   Cholesterol Screening Once every 5 years if you don't have a lipid disorder  May order more often based on risk factors  Lipid panel: 12/04/2019    Screening Not Indicated  History Lipid Disorder     Other Preventive Screenings Covered by Medicare:  1  Abdominal Aortic Aneurysm (AAA) Screening: covered once if your at risk  You're considered to be at risk if you have a family history of AAA  2  Lung Cancer Screening: covers low dose CT scan once per year if you meet all of the following conditions: (1) Age 50-69; (2) No signs or symptoms of lung cancer; (3) Current smoker or have quit smoking within the last 15 years; (4) You have a tobacco smoking history of at least 30 pack years (packs per day multiplied by number of years you smoked); (5) You get a written order from a healthcare provider  3  Glaucoma Screening: covered annually if you're considered high risk: (1) You have diabetes OR (2) Family history of glaucoma OR (3)  aged 48 and older OR (3)  American aged 72 and older  3  Osteoporosis Screening: covered every 2 years if you meet one of the following conditions: (1) You're estrogen deficient and at risk for osteoporosis based off medical history and other findings; (2) Have a vertebral abnormality; (3) On glucocorticoid therapy for more than 3 months; (4) Have primary hyperparathyroidism; (5) On osteoporosis medications and need to assess response to drug therapy  · Last bone density test (DXA Scan): 04/14/2021   5  HIV Screening: covered annually if you're between the age of 15-65  Also covered annually if you are younger than 13 and older than 72 with risk factors for HIV infection  For pregnant patients, it is covered up to 3 times per pregnancy      Immunizations:  Immunization Recommendations   Influenza Vaccine Annual influenza vaccination during flu season is recommended for all persons aged >= 6 months who do not have contraindications   Pneumococcal Vaccine (Prevnar and Pneumovax)  * Prevnar = PCV13  * Pneumovax = PPSV23   Adults 25-60 years old: 1-3 doses may be recommended based on certain risk factors  Adults 72 years old: Prevnar (PCV13) vaccine recommended followed by Pneumovax (PPSV23) vaccine  If already received PPSV23 since turning 65, then PCV13 recommended at least one year after PPSV23 dose  Hepatitis B Vaccine 3 dose series if at intermediate or high risk (ex: diabetes, end stage renal disease, liver disease)   Tetanus (Td) Vaccine - COST NOT COVERED BY MEDICARE PART B Following completion of primary series, a booster dose should be given every 10 years to maintain immunity against tetanus  Td may also be given as tetanus wound prophylaxis  Tdap Vaccine - COST NOT COVERED BY MEDICARE PART B Recommended at least once for all adults  For pregnant patients, recommended with each pregnancy  Shingles Vaccine (Shingrix) - COST NOT COVERED BY MEDICARE PART B  2 shot series recommended in those aged 48 and above     Health Maintenance Due:      Topic Date Due    Hepatitis C Screening  Never done    HIV Screening  Never done    Cervical Cancer Screening  02/26/2018    Breast Cancer Screening: Mammogram  03/22/2023    Colorectal Cancer Screening  05/06/2030     Immunizations Due:      Topic Date Due    Influenza Vaccine (1) 09/01/2021     Advance Directives   What are advance directives? Advance directives are legal documents that state your wishes and plans for medical care  These plans are made ahead of time in case you lose your ability to make decisions for yourself  Advance directives can apply to any medical decision, such as the treatments you want, and if you want to donate organs  What are the types of advance directives?   There are many types of advance directives, and each state has rules about how to use them  You may choose a combination of any of the following:  · Living will: This is a written record of the treatment you want  You can also choose which treatments you do not want, which to limit, and which to stop at a certain time  This includes surgery, medicine, IV fluid, and tube feedings  · Durable power of  for healthcare [facility]): This is a written record that states who you want to make healthcare choices for you when you are unable to make them for yourself  This person, called a proxy, is usually a family member or a friend  You may choose more than 1 proxy  · Do not resuscitate (DNR) order:  A DNR order is used in case your heart stops beating or you stop breathing  It is a request not to have certain forms of treatment, such as CPR  A DNR order may be included in other types of advance directives  · Medical directive: This covers the care that you want if you are in a coma, near death, or unable to make decisions for yourself  You can list the treatments you want for each condition  Treatment may include pain medicine, surgery, blood transfusions, dialysis, IV or tube feedings, and a ventilator (breathing machine)  · Values history: This document has questions about your views, beliefs, and how you feel and think about life  This information can help others choose the care that you would choose  Why are advance directives important? An advance directive helps you control your care  Although spoken wishes may be used, it is better to have your wishes written down  Spoken wishes can be misunderstood, or not followed  Treatments may be given even if you do not want them  An advance directive may make it easier for your family to make difficult choices about your care     Narcotic (Opioid) Safety    Use narcotics safely:  · Take prescribed narcotics exactly as directed  · Do not give narcotics to others or take narcotics that belong to someone else  · Do not mix narcotics without medicines or alcohol  · Do not drive or operate heavy machinery after you take the narcotic  · Monitor for side effects and notify your healthcare provider if you experienced side effects such as nausea, sleepiness, itching, or trouble thinking clearly  Manage constipation:    Constipation is the most common side effect of narcotic medicine  Constipation is when you have hard, dry bowel movements, or you go longer than usual between bowel movements  Tell your healthcare provider about all changes in your bowel movements while you are taking narcotics  He or she may recommend laxative medicine to help you have a bowel movement  He or she may also change the kind of narcotic you are taking, or change when you take it  The following are more ways you can prevent or relieve constipation:    · Drink liquids as directed  You may need to drink extra liquids to help soften and move your bowels  Ask how much liquid to drink each day and which liquids are best for you  · Eat high-fiber foods  This may help decrease constipation by adding bulk to your bowel movements  High-fiber foods include fruits, vegetables, whole-grain breads and cereals, and beans  Your healthcare provider or dietitian can help you create a high-fiber meal plan  Your provider may also recommend a fiber supplement if you cannot get enough fiber from food  · Exercise regularly  Regular physical activity can help stimulate your intestines  Walking is a good exercise to prevent or relieve constipation  Ask which exercises are best for you  · Schedule a time each day to have a bowel movement  This may help train your body to have regular bowel movements  Bend forward while you are on the toilet to help move the bowel movement out  Sit on the toilet for at least 10 minutes, even if you do not have a bowel movement  Store narcotics safely:   · Store narcotics where others cannot easily get them    Keep them in a locked cabinet or secure area  Do not  keep them in a purse or other bag you carry with you  A person may be looking for something else and find the narcotics  · Make sure narcotics are stored out of the reach of children  A child can easily overdose on narcotics  Narcotics may look like candy to a small child  The best way to dispose of narcotics: The laws vary by country and area  In the [place], the best way is to return the narcotics through a take-back program  This program is offered by the [facility] ([facility])  The following are options for using the program:  · Take the narcotics to a ERAN collection site  The site is often a law enforcement center  Call your local law enforcement center for scheduled take-back days in your area  You will be given information on where to go if the collection site is in a different location  · Take the narcotics to an approved pharmacy or hospital   A pharmacy or hospital may be set up as a collection site  You will need to ask if it is a ERAN collection site if you were not directed there  A pharmacy or doctor's office may not be able to take back narcotics unless it is a ERAN site  · Use a mail-back system  This means you are given containers to put the narcotics into  You will then mail them in the containers  · Use a take-back drop box  This is a place to leave the narcotics at any time  People and animals will not be able to get into the box  Your local law enforcement agency can tell you where to find a drop box in your area  Other ways to manage pain:   · Ask your healthcare provider about non-narcotic medicines to control pain  Nonprescription medicines include NSAIDs (such as ibuprofen) and acetaminophen  Prescription medicines include muscle relaxers, antidepressants, and steroids  · Pain may be managed without any medicines  Some ways to relieve pain include massage, aromatherapy, or meditation   Physical or occupational therapy may also help  For more information:   · Drug Enforcement Administration  [address] [facility] [address]  Phone: 6- 574 - [phone]  Web Address: [facility]/drug_disposal/    · [address] and Drug Administration  [place] , [address]  Phone: 3- 718 - [phone]  Web Address: http://iChange/     © Copyright [place] 2018 Information is for End User's use only and may not be sold, redistributed or otherwise used for commercial purposes   All illustrations and images included in CareNotes® are the copyrighted property of A D A M , Inc  or [employer]

## 2021-08-02 ENCOUNTER — APPOINTMENT (OUTPATIENT)
Dept: LAB | Facility: HOSPITAL | Age: 56
End: 2021-08-02
Payer: COMMERCIAL

## 2021-08-02 DIAGNOSIS — Z11.4 SCREENING FOR HIV WITHOUT PRESENCE OF RISK FACTORS: ICD-10-CM

## 2021-08-02 DIAGNOSIS — Z11.59 ENCOUNTER FOR HEPATITIS C SCREENING TEST FOR LOW RISK PATIENT: ICD-10-CM

## 2021-08-02 DIAGNOSIS — E78.00 HYPERCHOLESTEREMIA: ICD-10-CM

## 2021-08-02 LAB
CHOLEST SERPL-MCNC: 253 MG/DL (ref 50–200)
HCV AB SER QL: NORMAL
HDLC SERPL-MCNC: 73 MG/DL
LDLC SERPL CALC-MCNC: 157 MG/DL (ref 0–100)
NONHDLC SERPL-MCNC: 180 MG/DL
TRIGL SERPL-MCNC: 117 MG/DL

## 2021-08-02 PROCEDURE — 36415 COLL VENOUS BLD VENIPUNCTURE: CPT

## 2021-08-02 PROCEDURE — 86803 HEPATITIS C AB TEST: CPT

## 2021-08-02 PROCEDURE — 80061 LIPID PANEL: CPT

## 2021-08-02 PROCEDURE — 87389 HIV-1 AG W/HIV-1&-2 AB AG IA: CPT

## 2021-08-04 LAB — HIV 1+2 AB+HIV1 P24 AG SERPL QL IA: NORMAL

## 2021-08-10 ENCOUNTER — HOSPITAL ENCOUNTER (OUTPATIENT)
Dept: NUCLEAR MEDICINE | Facility: HOSPITAL | Age: 56
Discharge: HOME | End: 2021-08-10
Payer: COMMERCIAL

## 2021-08-10 DIAGNOSIS — M47.812 SPONDYLOSIS WITHOUT MYELOPATHY OR RADICULOPATHY, CERVICAL REGION: ICD-10-CM

## 2021-08-10 DIAGNOSIS — M50.30 OTHER CERVICAL DISC DEGENERATION, UNSPECIFIED CERVICAL REGION: ICD-10-CM

## 2021-08-10 PROCEDURE — A9503 TC99M MEDRONATE: HCPCS

## 2021-08-10 PROCEDURE — 78306 BONE IMAGING WHOLE BODY: CPT

## 2021-10-29 DIAGNOSIS — M19.90 ARTHRITIS: ICD-10-CM

## 2021-10-29 RX ORDER — MELOXICAM 15 MG/1
15 TABLET ORAL DAILY
Qty: 90 TABLET | Refills: 1 | Status: SHIPPED | OUTPATIENT
Start: 2021-10-29 | End: 2022-05-02

## 2021-12-12 ENCOUNTER — HOSPITAL ENCOUNTER (EMERGENCY)
Facility: HOSPITAL | Age: 56
Discharge: HOME | End: 2021-12-12
Attending: EMERGENCY MEDICINE | Admitting: EMERGENCY MEDICINE
Payer: COMMERCIAL

## 2021-12-12 VITALS
RESPIRATION RATE: 18 BRPM | OXYGEN SATURATION: 98 % | DIASTOLIC BLOOD PRESSURE: 59 MMHG | SYSTOLIC BLOOD PRESSURE: 111 MMHG | HEART RATE: 84 BPM | BODY MASS INDEX: 19.2 KG/M2 | TEMPERATURE: 98.7 F | WEIGHT: 105 LBS

## 2021-12-12 DIAGNOSIS — M19.019 OA (OSTEOARTHRITIS) OF SHOULDER: ICD-10-CM

## 2021-12-12 DIAGNOSIS — G89.29 CHRONIC RIGHT SHOULDER PAIN: Primary | ICD-10-CM

## 2021-12-12 DIAGNOSIS — M25.511 CHRONIC RIGHT SHOULDER PAIN: Primary | ICD-10-CM

## 2021-12-12 PROCEDURE — 99284 EMERGENCY DEPT VISIT MOD MDM: CPT | Performed by: PHYSICIAN ASSISTANT

## 2021-12-12 PROCEDURE — 99283 EMERGENCY DEPT VISIT LOW MDM: CPT

## 2021-12-12 PROCEDURE — 96372 THER/PROPH/DIAG INJ SC/IM: CPT

## 2021-12-12 RX ORDER — KETOROLAC TROMETHAMINE 30 MG/ML
30 INJECTION, SOLUTION INTRAMUSCULAR; INTRAVENOUS ONCE
Status: COMPLETED | OUTPATIENT
Start: 2021-12-12 | End: 2021-12-12

## 2021-12-12 RX ORDER — LIDOCAINE 50 MG/G
1 PATCH TOPICAL ONCE
Status: DISCONTINUED | OUTPATIENT
Start: 2021-12-12 | End: 2021-12-12 | Stop reason: HOSPADM

## 2021-12-12 RX ORDER — TIZANIDINE HYDROCHLORIDE 2 MG/1
2 CAPSULE, GELATIN COATED ORAL 3 TIMES DAILY PRN
Qty: 30 CAPSULE | Refills: 0 | Status: SHIPPED | OUTPATIENT
Start: 2021-12-12 | End: 2022-08-04 | Stop reason: SDUPTHER

## 2021-12-12 RX ADMIN — KETOROLAC TROMETHAMINE 30 MG: 30 INJECTION, SOLUTION INTRAMUSCULAR; INTRAVENOUS at 11:45

## 2021-12-12 RX ADMIN — LIDOCAINE 5% 1 PATCH: 700 PATCH TOPICAL at 11:45

## 2021-12-14 ENCOUNTER — OFFICE VISIT (OUTPATIENT)
Dept: OBGYN CLINIC | Facility: CLINIC | Age: 56
End: 2021-12-14
Payer: COMMERCIAL

## 2021-12-14 VITALS
HEIGHT: 62 IN | WEIGHT: 105 LBS | SYSTOLIC BLOOD PRESSURE: 131 MMHG | BODY MASS INDEX: 19.32 KG/M2 | HEART RATE: 69 BPM | DIASTOLIC BLOOD PRESSURE: 79 MMHG

## 2021-12-14 DIAGNOSIS — M50.30 DDD (DEGENERATIVE DISC DISEASE), CERVICAL: ICD-10-CM

## 2021-12-14 DIAGNOSIS — M19.011 ARTHRITIS OF RIGHT SHOULDER REGION: Primary | ICD-10-CM

## 2021-12-14 PROCEDURE — 20610 DRAIN/INJ JOINT/BURSA W/O US: CPT | Performed by: ORTHOPAEDIC SURGERY

## 2021-12-14 PROCEDURE — 99213 OFFICE O/P EST LOW 20 MIN: CPT | Performed by: ORTHOPAEDIC SURGERY

## 2021-12-14 PROCEDURE — 3008F BODY MASS INDEX DOCD: CPT | Performed by: ORTHOPAEDIC SURGERY

## 2021-12-14 PROCEDURE — 1036F TOBACCO NON-USER: CPT | Performed by: ORTHOPAEDIC SURGERY

## 2021-12-14 RX ORDER — BUPIVACAINE HYDROCHLORIDE 2.5 MG/ML
4 INJECTION, SOLUTION INFILTRATION; PERINEURAL
Status: COMPLETED | OUTPATIENT
Start: 2021-12-14 | End: 2021-12-14

## 2021-12-14 RX ORDER — TRIAMCINOLONE ACETONIDE 40 MG/ML
80 INJECTION, SUSPENSION INTRA-ARTICULAR; INTRAMUSCULAR
Status: COMPLETED | OUTPATIENT
Start: 2021-12-14 | End: 2021-12-14

## 2021-12-14 RX ADMIN — BUPIVACAINE HYDROCHLORIDE 4 ML: 2.5 INJECTION, SOLUTION INFILTRATION; PERINEURAL at 11:47

## 2021-12-14 RX ADMIN — TRIAMCINOLONE ACETONIDE 80 MG: 40 INJECTION, SUSPENSION INTRA-ARTICULAR; INTRAMUSCULAR at 11:47

## 2022-02-01 ENCOUNTER — OFFICE VISIT (OUTPATIENT)
Dept: OBGYN CLINIC | Facility: CLINIC | Age: 57
End: 2022-02-01
Payer: COMMERCIAL

## 2022-02-01 VITALS
HEIGHT: 62 IN | HEART RATE: 71 BPM | WEIGHT: 103 LBS | BODY MASS INDEX: 18.95 KG/M2 | DIASTOLIC BLOOD PRESSURE: 72 MMHG | SYSTOLIC BLOOD PRESSURE: 119 MMHG

## 2022-02-01 DIAGNOSIS — M19.011 ARTHRITIS OF RIGHT SHOULDER REGION: Primary | ICD-10-CM

## 2022-02-01 PROCEDURE — 1036F TOBACCO NON-USER: CPT | Performed by: ORTHOPAEDIC SURGERY

## 2022-02-01 PROCEDURE — 99213 OFFICE O/P EST LOW 20 MIN: CPT | Performed by: ORTHOPAEDIC SURGERY

## 2022-02-01 PROCEDURE — 3008F BODY MASS INDEX DOCD: CPT | Performed by: ORTHOPAEDIC SURGERY

## 2022-02-01 NOTE — PROGRESS NOTES
Assessment:   Diagnosis ICD-10-CM Associated Orders   1  Arthritis of right shoulder region  M19 [id number]        Plan:  · Patient has had significant benefit of her symptoms form a cortisone injection on 12/14/2021 for known advanced osteoarthritis of her right shoulder  · Continue to perform daily activities as tolerated with modifications to avoid pain  · Patient weill follow up as needed  May repeat injection once every 3-4 months    The patient is doing quite well in regards to her through the right shoulder  She has full strength full motion  There is, however, glenohumeral crepitation  At this point, she is asymptomatic  Continue home exercise program   Continue stretching  Follow up on an as-needed basis  If her condition changes, she will not hesitate to let us know    To do next visit:  Return if symptoms worsen or fail to improve  The above stated was discussed in layman's terms and the patient expressed understanding  All questions were answered to the patient's satisfaction  Scribe Attestation    I,:  Lauryn Mina am acting as a scribe while in the presence of the attending physician :       I,:  Louis Nam, DO personally performed the services described in this documentation    as scribed in my presence :             Subjective:   Ceci Cheema is a 49 y o  female who presents today for a follow up visit for her right shoulder  She has known advanced glenohumeral joint osteoarthritis of the right shoulder  She was provided with a cortisone injection on 12/14/2021 with significant benefit of her symptoms  She denies pain about the shoulder on a daily basis  No numbness or tingling  No fevers or chills  Review of systems negative unless otherwise specified in HPI  Review of Systems   Constitutional: Negative for chills, fever and unexpected weight change  HENT: Negative for hearing loss, nosebleeds and sore throat      Eyes: Negative for pain, redness and visual disturbance  Respiratory: Negative for cough, shortness of breath and wheezing  Cardiovascular: Negative for chest pain, palpitations and leg swelling  Gastrointestinal: Negative for abdominal distention, nausea and vomiting  Endocrine: Negative for polydipsia and polyuria  Genitourinary: Negative for dysuria and hematuria  Skin: Negative for rash and wound  Neurological: Negative for dizziness, numbness and headaches  Psychiatric/Behavioral: Negative for decreased concentration and suicidal ideas  Past Medical History:   Diagnosis Date    Anxiety     Back problem     Shoulder pain        Past Surgical History:   Procedure Laterality Date    BREAST BIOPSY Right 2005    COLONOSCOPY      DENTAL IMPLANT      DILATION AND CURETTAGE OF UTERUS      ENDOMETRIAL ABLATION      LEG SURGERY Left     LEG SURGERY      SHOULDER ARTHROSCOPY Right     shoulder       Family History   Problem Relation Age of Onset    Colon cancer Paternal Grandmother     Breast cancer Paternal Grandmother 79    Arthritis Family     Diabetes Family     Hypertension Family     Osteoporosis Family     Ovarian cancer Family     Uterine cancer Family     No Known Problems Mother     Heart disease Father     Heart disease Paternal Grandfather     Diabetes Paternal Aunt     No Known Problems Paternal Aunt        Social History     Occupational History    Not on file   Tobacco Use    Smoking status: Former Smoker     Types: Cigarettes     Quit date: 1991     Years since quittin 0    Smokeless tobacco: Never Used   Vaping Use    Vaping Use: Never used   Substance and Sexual Activity    Alcohol use:  Yes     Alcohol/week: 7 0 standard drinks     Types: 7 Glasses of wine per week     Comment: glass of wine nightly    Drug use: No    Sexual activity: Not Currently     Partners: Male         Current Outpatient Medications:     Calcium Carbonate-Vitamin D (CALCIUM 500 + D PO), Take 1 tablet by mouth daily, Disp: , Rfl:     DULoxetine (CYMBALTA) 30 mg delayed release capsule, take 1 capsule by mouth once daily, Disp: 90 capsule, Rfl: 2    gabapentin (NEURONTIN) 300 mg capsule, Take 1 capsule (300 mg total) by mouth 3 (three) times a day, Disp: 270 capsule, Rfl: 1    GLUCOSAMINE-CHONDROITIN PO, Take 2 caplets by mouth daily, Disp: , Rfl:     lidocaine (LMX) 4 % cream, Apply topically as needed for mild pain, Disp: 30 g, Rfl: 0    meloxicam (MOBIC) 15 mg tablet, Take 1 tablet (15 mg total) by mouth daily, Disp: 90 tablet, Rfl: 1    multivitamin (THERAGRAN) TABS, Take 1 tablet by mouth daily, Disp: , Rfl:     oxyCODONE-acetaminophen (PERCOCET) 5-325 mg per tablet, take 1 tablet by mouth every 6 hours if needed for severe pain MAX DAILY AMOUNT OF 4 TABLETS, Disp: , Rfl:     polyethylene glycol (GLYCOLAX) powder, Take 17 g by mouth 2 (two) times a day, Disp: 578 g, Rfl: 3    TiZANidine (ZANAFLEX) 2 MG capsule, Take 1 capsule (2 mg total) by mouth 3 (three) times a day as needed for muscle spasms, Disp: 30 capsule, Rfl: 0    ketoconazole (NIZORAL) 2 % cream, APPLY TWICE A DAY FOR 2 WEEKS AS NEEDED FOR ITCHING, Disp: , Rfl:     Allergies   Allergen Reactions    Amitriptyline      Other reaction(s): Constipation, Dry mouth            Vitals:    02/01/22 0935   BP: 119/72   Pulse: 71       Objective:                    Right Shoulder Exam     Tenderness   The patient is experiencing no tenderness  Range of Motion   External rotation: 50   Forward flexion: 160   Internal rotation 0 degrees: Lumbar     Muscle Strength   Abduction: 5/5     Other   Erythema: absent  Sensation: normal  Pulse: present               Diagnostics, reviewed and taken today if performed as documented:    None performed      Procedures, if performed today:    Procedures    None performed      Portions of the record may have been created with voice recognition software    Occasional wrong word or "sound a like" substitutions may have occurred due to the inherent limitations of voice recognition software  Read the chart carefully and recognize, using context, where substitutions have occurred

## 2022-03-18 ENCOUNTER — HOSPITAL ENCOUNTER (OUTPATIENT)
Dept: RADIOLOGY | Facility: HOSPITAL | Age: 57
Discharge: HOME | End: 2022-03-18
Payer: COMMERCIAL

## 2022-03-18 DIAGNOSIS — M79.18 DIFFUSE MYOFASCIAL PAIN SYNDROME: ICD-10-CM

## 2022-03-18 DIAGNOSIS — M46.1 SACROILIITIS, NOT ELSEWHERE CLASSIFIED (HCC): ICD-10-CM

## 2022-03-18 PROCEDURE — 73502 X-RAY EXAM HIP UNI 2-3 VIEWS: CPT

## 2022-04-11 ENCOUNTER — OFFICE VISIT (OUTPATIENT)
Dept: OBGYN CLINIC | Facility: CLINIC | Age: 57
End: 2022-04-11
Payer: COMMERCIAL

## 2022-04-11 VITALS
HEIGHT: 62 IN | SYSTOLIC BLOOD PRESSURE: 128 MMHG | HEART RATE: 79 BPM | WEIGHT: 101 LBS | DIASTOLIC BLOOD PRESSURE: 74 MMHG | BODY MASS INDEX: 18.58 KG/M2

## 2022-04-11 DIAGNOSIS — M75.21 BICEPS TENDINITIS OF RIGHT UPPER EXTREMITY: Primary | ICD-10-CM

## 2022-04-11 PROCEDURE — 3008F BODY MASS INDEX DOCD: CPT | Performed by: PHYSICIAN ASSISTANT

## 2022-04-11 PROCEDURE — 20610 DRAIN/INJ JOINT/BURSA W/O US: CPT | Performed by: PHYSICIAN ASSISTANT

## 2022-04-11 PROCEDURE — 1036F TOBACCO NON-USER: CPT | Performed by: PHYSICIAN ASSISTANT

## 2022-04-11 PROCEDURE — 99213 OFFICE O/P EST LOW 20 MIN: CPT | Performed by: PHYSICIAN ASSISTANT

## 2022-04-11 RX ORDER — AMOXICILLIN 500 MG/1
CAPSULE ORAL
COMMUNITY
Start: 2022-04-05 | End: 2022-05-25 | Stop reason: ALTCHOICE

## 2022-04-11 RX ORDER — TRIAMCINOLONE ACETONIDE 40 MG/ML
80 INJECTION, SUSPENSION INTRA-ARTICULAR; INTRAMUSCULAR
Status: COMPLETED | OUTPATIENT
Start: 2022-04-11 | End: 2022-04-11

## 2022-04-11 RX ORDER — BUPIVACAINE HYDROCHLORIDE 2.5 MG/ML
4 INJECTION, SOLUTION INFILTRATION; PERINEURAL
Status: COMPLETED | OUTPATIENT
Start: 2022-04-11 | End: 2022-04-11

## 2022-04-11 RX ADMIN — BUPIVACAINE HYDROCHLORIDE 4 ML: 2.5 INJECTION, SOLUTION INFILTRATION; PERINEURAL at 11:50

## 2022-04-11 RX ADMIN — TRIAMCINOLONE ACETONIDE 80 MG: 40 INJECTION, SUSPENSION INTRA-ARTICULAR; INTRAMUSCULAR at 11:50

## 2022-04-11 NOTE — PROGRESS NOTES
ASSESSMENT/PLAN:    Diagnoses and all orders for this visit:    Biceps tendinitis of right upper extremity    Other orders  -     amoxicillin (AMOXIL) 500 mg capsule; take 1 capsule by mouth every 8 hours until finished  -     Large joint arthrocentesis        The patient's right upper extremity pain is most like emanating from biceps tendinitis  Possible treatment options were discussed with the patient  She elected for corticosteroid injection  The right biceps tendon sheath was injected with Kenalog and Marcaine  She tolerated the injection quite well  She will follow up with our office in 3 months  The patient is acceptable to this plan  Return in about 3 months (around 7/11/2022)  The patient has bicipital tendinitis along her right shoulder  Under aseptic technique, the biceps tendon sheath was injected with Kenalog and Marcaine  She tolerated procedure quite well  Return back in 3 months for re-evaluation  If her condition  changes, she will not hesitate to let us know      _____________________________________________________  CHIEF COMPLAINT:  Chief Complaint   Patient presents with    Right Arm - Follow-up         SUBJECTIVE:  Mari Richards is a 49 y o  female who presents to our office complaining of right upper extremity pain  The patient states that her pain radiates from right shoulder to her elbow  Most of her pain seems to be along her proximal biceps  In the past she has received corticosteroid injection to her subacromial bursa  She denies any numbness or tingling  She denies any fever or chills        The following portions of the patient's history were reviewed and updated as appropriate: allergies, current medications, past family history, past medical history, past social history, past surgical history and problem list     PAST MEDICAL HISTORY:  Past Medical History:   Diagnosis Date    Anxiety     Back problem     Shoulder pain        PAST SURGICAL HISTORY:  Past Surgical History:   Procedure Laterality Date    BREAST BIOPSY Right 2005    COLONOSCOPY      DENTAL IMPLANT      DILATION AND CURETTAGE OF UTERUS      ENDOMETRIAL ABLATION      LEG SURGERY Left     LEG SURGERY      SHOULDER ARTHROSCOPY Right     shoulder       FAMILY HISTORY:  Family History   Problem Relation Age of Onset    Colon cancer Paternal Grandmother     Breast cancer Paternal Grandmother 79    Arthritis Family     Diabetes Family     Hypertension Family     Osteoporosis Family     Ovarian cancer Family     Uterine cancer Family     No Known Problems Mother     Heart disease Father     Heart disease Paternal Grandfather     Diabetes Paternal Aunt     No Known Problems Paternal Aunt        SOCIAL HISTORY:  Social History     Tobacco Use    Smoking status: Former Smoker     Types: Cigarettes     Quit date: 1991     Years since quittin 2    Smokeless tobacco: Never Used   Vaping Use    Vaping Use: Never used   Substance Use Topics    Alcohol use:  Yes     Alcohol/week: 7 0 standard drinks     Types: 7 Glasses of wine per week     Comment: glass of wine nightly    Drug use: No       MEDICATIONS:    Current Outpatient Medications:     Calcium Carbonate-Vitamin D (CALCIUM 500 + D PO), Take 1 tablet by mouth daily, Disp: , Rfl:     DULoxetine (CYMBALTA) 30 mg delayed release capsule, take 1 capsule by mouth once daily, Disp: 90 capsule, Rfl: 0    gabapentin (NEURONTIN) 300 mg capsule, Take 1 capsule (300 mg total) by mouth 3 (three) times a day, Disp: 270 capsule, Rfl: 1    GLUCOSAMINE-CHONDROITIN PO, Take 2 caplets by mouth daily, Disp: , Rfl:     lidocaine (LMX) 4 % cream, Apply topically as needed for mild pain, Disp: 30 g, Rfl: 0    meloxicam (MOBIC) 15 mg tablet, Take 1 tablet (15 mg total) by mouth daily, Disp: 90 tablet, Rfl: 1    multivitamin (THERAGRAN) TABS, Take 1 tablet by mouth daily, Disp: , Rfl:     oxyCODONE-acetaminophen (PERCOCET) 5-325 mg per tablet, take 1 tablet by mouth every 6 hours if needed for severe pain MAX DAILY AMOUNT OF 4 TABLETS, Disp: , Rfl:     polyethylene glycol (GLYCOLAX) powder, Take 17 g by mouth 2 (two) times a day, Disp: 578 g, Rfl: 3    TiZANidine (ZANAFLEX) 2 MG capsule, Take 1 capsule (2 mg total) by mouth 3 (three) times a day as needed for muscle spasms, Disp: 30 capsule, Rfl: 0    amoxicillin (AMOXIL) 500 mg capsule, take 1 capsule by mouth every 8 hours until finished, Disp: , Rfl:     ketoconazole (NIZORAL) 2 % cream, APPLY TWICE A DAY FOR 2 WEEKS AS NEEDED FOR ITCHING, Disp: , Rfl:     ALLERGIES:  Allergies   Allergen Reactions    Amitriptyline      Other reaction(s): Constipation, Dry mouth       ROS:  Review of Systems     Constitutional: Negative for fatigue, fever or loss of appetite  HENT: Negative  Respiratory: Negative for shortness of breath, dyspnea  Cardiovascular: Negative for chest pain/tightness  Gastrointestinal: Negative for abdominal pain, N/V  Endocrine: Negative for cold/heat intolerance, unexplained weight loss/gain  Genitourinary: Negative for flank pain, dysuria, hematuria  Musculoskeletal: Positive for arthralgia   Skin: Negative for rash  Neurological: Negative for numbness or tingling  Psychiatric/Behavioral: Negative for agitation  _____________________________________________________  PHYSICAL EXAMINATION:    Blood pressure 128/74, pulse 79, height 5' 2" (1 575 m), weight 45 8 kg (101 lb)  Constitutional: Oriented to person, place, and time  Appears well-developed and well-nourished  No distress  HENT:   Head: Normocephalic  Eyes: Conjunctivae are normal  Right eye exhibits no discharge  Left eye exhibits no discharge  No scleral icterus  Cardiovascular: Normal rate  Pulmonary/Chest: Effort normal    Neurological: Alert and oriented to person, place, and time  Skin: Skin is warm and dry  No rash noted  Not diaphoretic  No erythema  No pallor     Psychiatric: Normal mood and affect  Behavior is normal  Judgment and thought content normal       MUSCULOSKELETAL EXAMINATION:   Physical Exam  Ortho Exam    Right upper extremity is neurovascularly intact  Fingers are pink and mobile  Compartments are soft  Tenderness to palpation along biceps tendon sheath  Range of motion of the shoulders from 0-160 degrees of forward flexion abduction  Rotator cuff strength 4 5/5  Mild signs of impingement  Brisk cap refill  Sensation intact  Objective:  BP Readings from Last 1 Encounters:   04/11/22 128/74      Wt Readings from Last 1 Encounters:   04/11/22 45 8 kg (101 lb)        BMI:   Estimated body mass index is 18 47 kg/m² as calculated from the following:    Height as of this encounter: 5' 2" (1 575 m)  Weight as of this encounter: 45 8 kg (101 lb)  PROCEDURES PERFORMED:  Large joint arthrocentesis  Universal Protocol:  Risks and benefits: risks, benefits and alternatives were discussed  Consent given by: patient  Patient understanding: patient states understanding of the procedure being performed  Site marked: the operative site was marked  Patient identity confirmed: verbally with patient    Supporting Documentation  Indications: pain   Procedure Details  Location: shoulder - Shoulder joint: Right biceps tendon sheath    Preparation: Patient was prepped and draped in the usual sterile fashion  Needle size: 22 G  Ultrasound guidance: no  Approach: lateral  Medications administered: 4 mL bupivacaine 0 25 %; 80 mg triamcinolone acetonide 40 mg/mL    Patient tolerance: patient tolerated the procedure well with no immediate complications  Dressing:  Sterile dressing applied            Scribe Attestation    I,:  Jeannette Rascon PA-C am acting as a scribe while in the presence of the attending physician :       I,:  Hoa Marques DO personally performed the services described in this documentation    as scribed in my presence :

## 2022-05-10 ENCOUNTER — OFFICE VISIT (OUTPATIENT)
Dept: OBGYN CLINIC | Facility: CLINIC | Age: 57
End: 2022-05-10
Payer: COMMERCIAL

## 2022-05-10 VITALS
WEIGHT: 101 LBS | HEART RATE: 81 BPM | DIASTOLIC BLOOD PRESSURE: 62 MMHG | BODY MASS INDEX: 18.58 KG/M2 | SYSTOLIC BLOOD PRESSURE: 96 MMHG | HEIGHT: 62 IN

## 2022-05-10 DIAGNOSIS — M50.30 DDD (DEGENERATIVE DISC DISEASE), CERVICAL: Primary | ICD-10-CM

## 2022-05-10 PROCEDURE — 99213 OFFICE O/P EST LOW 20 MIN: CPT | Performed by: ORTHOPAEDIC SURGERY

## 2022-05-10 PROCEDURE — 1036F TOBACCO NON-USER: CPT | Performed by: ORTHOPAEDIC SURGERY

## 2022-05-10 NOTE — PROGRESS NOTES
ASSESSMENT/PLAN:    Diagnoses and all orders for this visit:    DDD (degenerative disc disease), cervical  -     Ambulatory Referral to Pain Management; Future        Previous corticosteroid injection did help with her symptoms along her right shoulder  I believe that her symptoms that are affecting her right scapula as well as her entire arm are attributed to her degenerative disc disease of her cervical spine  We will refer her to Spine and Pain  The patient can follow up with our office as needed  She is acceptable to this plan  Return if symptoms worsen or fail to improve  It appears she has pain emanating from her neck into her right shoulder, especially around the scapular blade  After reviewing her previous MRI which was done within a year this shows cervical disc disease along this region  The patient has a pain management doctor  She was instructed to contact here she  I look forward to hearing back once this is obtained  Encouraged ambulation  _____________________________________________________  CHIEF COMPLAINT:  Chief Complaint   Patient presents with    Right Shoulder - Follow-up         SUBJECTIVE:  Laurie Ordoñez is a 49 y o  female who presents to our office for follow-up visit  Last visit, the patient was complaining of pain of her right shoulder  She was given a corticosteroid injection  The pain in that area has improved  Today she complains of pain along her right scapula  She also complains of pain that radiates from her neck to her wrist   She denies any fever or chills  She denies any new falls or trauma        The following portions of the patient's history were reviewed and updated as appropriate: allergies, current medications, past family history, past medical history, past social history, past surgical history and problem list     PAST MEDICAL HISTORY:  Past Medical History:   Diagnosis Date    Anxiety     Back problem     Shoulder pain        PAST SURGICAL HISTORY:  Past Surgical History:   Procedure Laterality Date    BREAST BIOPSY Right 2005    COLONOSCOPY      DENTAL IMPLANT      DILATION AND CURETTAGE OF UTERUS      ENDOMETRIAL ABLATION      LEG SURGERY Left     LEG SURGERY      SHOULDER ARTHROSCOPY Right     shoulder       FAMILY HISTORY:  Family History   Problem Relation Age of Onset    Colon cancer Paternal Grandmother     Breast cancer Paternal Grandmother 79    Arthritis Family     Diabetes Family     Hypertension Family     Osteoporosis Family     Ovarian cancer Family     Uterine cancer Family     No Known Problems Mother     Heart disease Father     Heart disease Paternal Grandfather     Diabetes Paternal Aunt     No Known Problems Paternal Aunt        SOCIAL HISTORY:  Social History     Tobacco Use    Smoking status: Former Smoker     Types: Cigarettes     Quit date: 1991     Years since quittin 3    Smokeless tobacco: Never Used   Vaping Use    Vaping Use: Never used   Substance Use Topics    Alcohol use:  Yes     Alcohol/week: 7 0 standard drinks     Types: 7 Glasses of wine per week     Comment: glass of wine nightly    Drug use: No       MEDICATIONS:    Current Outpatient Medications:     amoxicillin (AMOXIL) 500 mg capsule, take 1 capsule by mouth every 8 hours until finished, Disp: , Rfl:     Calcium Carbonate-Vitamin D (CALCIUM 500 + D PO), Take 1 tablet by mouth daily, Disp: , Rfl:     DULoxetine (CYMBALTA) 30 mg delayed release capsule, take 1 capsule by mouth once daily, Disp: 90 capsule, Rfl: 0    gabapentin (NEURONTIN) 300 mg capsule, Take 1 capsule (300 mg total) by mouth 3 (three) times a day, Disp: 270 capsule, Rfl: 1    GLUCOSAMINE-CHONDROITIN PO, Take 2 caplets by mouth daily, Disp: , Rfl:     lidocaine (LMX) 4 % cream, Apply topically as needed for mild pain, Disp: 30 g, Rfl: 0    meloxicam (MOBIC) 15 mg tablet, take 1 tablet by mouth once daily, Disp: 90 tablet, Rfl: 0    multivitamin (THERAGRAN) TABS, Take 1 tablet by mouth daily, Disp: , Rfl:     oxyCODONE-acetaminophen (PERCOCET) 5-325 mg per tablet, take 1 tablet by mouth every 6 hours if needed for severe pain MAX DAILY AMOUNT OF 4 TABLETS, Disp: , Rfl:     polyethylene glycol (GLYCOLAX) powder, Take 17 g by mouth 2 (two) times a day, Disp: 578 g, Rfl: 3    TiZANidine (ZANAFLEX) 2 MG capsule, Take 1 capsule (2 mg total) by mouth 3 (three) times a day as needed for muscle spasms, Disp: 30 capsule, Rfl: 0    ALLERGIES:  Allergies   Allergen Reactions    Amitriptyline      Other reaction(s): Constipation, Dry mouth       ROS:  Review of Systems     Constitutional: Negative for fatigue, fever or loss of appetite  HENT: Negative  Respiratory: Negative for shortness of breath, dyspnea  Cardiovascular: Negative for chest pain/tightness  Gastrointestinal: Negative for abdominal pain, N/V  Endocrine: Negative for cold/heat intolerance, unexplained weight loss/gain  Genitourinary: Negative for flank pain, dysuria, hematuria  Musculoskeletal: Positive for arthralgia   Skin: Negative for rash  Neurological: Negative for numbness or tingling  Psychiatric/Behavioral: Negative for agitation  _____________________________________________________  PHYSICAL EXAMINATION:    Blood pressure 96/62, pulse 81, height 5' 2" (1 575 m), weight 45 8 kg (101 lb)  Constitutional: Oriented to person, place, and time  Appears well-developed and well-nourished  No distress  HENT:   Head: Normocephalic  Eyes: Conjunctivae are normal  Right eye exhibits no discharge  Left eye exhibits no discharge  No scleral icterus  Cardiovascular: Normal rate  Pulmonary/Chest: Effort normal    Neurological: Alert and oriented to person, place, and time  Skin: Skin is warm and dry  No rash noted  Not diaphoretic  No erythema  No pallor  Psychiatric: Normal mood and affect   Behavior is normal  Judgment and thought content normal       MUSCULOSKELETAL EXAMINATION:   Physical Exam  Ortho Exam    Right upper extremity is neurovascularly intact  Fingers are pink and mobile  Compartments are soft  Range of motion of the shoulder is from 0-150 degrees of forward flexion and abduction  Rotator cuff strength 5/5  Brisk cap refill  Sensation intact    Objective:  BP Readings from Last 1 Encounters:   05/10/22 96/62      Wt Readings from Last 1 Encounters:   05/10/22 45 8 kg (101 lb)        BMI:   Estimated body mass index is 18 47 kg/m² as calculated from the following:    Height as of this encounter: 5' 2" (1 575 m)  Weight as of this encounter: 45 8 kg (101 lb)          Scribe Attestation    I,:  Almaz Erwin PA-C am acting as a scribe while in the presence of the attending physician :       I,:  Jimmy Dow, DO personally performed the services described in this documentation    as scribed in my presence :

## 2022-05-23 ENCOUNTER — NURSE TRIAGE (OUTPATIENT)
Dept: OTHER | Facility: OTHER | Age: 57
End: 2022-05-23

## 2022-05-23 DIAGNOSIS — Z23 NEED FOR SHINGLES VACCINE: Primary | ICD-10-CM

## 2022-05-23 RX ORDER — ZOSTER VACCINE RECOMBINANT, ADJUVANTED 50 MCG/0.5
0.5 KIT INTRAMUSCULAR ONCE
Qty: 1 EACH | Refills: 1 | Status: SHIPPED | OUTPATIENT
Start: 2022-05-23 | End: 2022-05-23

## 2022-05-23 NOTE — TELEPHONE ENCOUNTER
Patient would like to be seen in the office for ongoing R arm pain for the last year  She would like a eddie back to advise  Reason for Disposition   Patient wants to be seen    Answer Assessment - Initial Assessment Questions  1  ONSET: "When did the pain start?"      Ongoing over a year   2  LOCATION: "Where is the pain located?"      R arm   3  PAIN: "How bad is the pain?" (Scale 1-10; or mild, moderate, severe)    - MILD (1-3): doesn't interfere with normal activities    - MODERATE (4-7): interferes with normal activities (e g , work or school) or awakens from sleep    - SEVERE (8-10): excruciating pain, unable to do any normal activities, unable to hold a cup of water      Moderate  4  WORK OR EXERCISE: "Has there been any recent work or exercise that involved this part of the body?"      Denies   5  CAUSE: "What do you think is causing the arm pain?"      Ongoing   6  OTHER SYMPTOMS: "Do you have any other symptoms?" (e g , neck pain, swelling, rash, fever, numbness, weakness)      Denies  7   PREGNANCY: "Is there any chance you are pregnant?" "When was your last menstrual period?"      N/A    Protocols used: ARM PAIN-ADULT-[place]

## 2022-05-23 NOTE — TELEPHONE ENCOUNTER
Regarding: Shoulder and arem Pain   ----- Message from Julius Felipe sent at 5/23/2022  9:03 AM EDT -----  "My shoulder all the way down my arm is hurting  I need something done   I can't sleep at night, I can't get comfortable "

## 2022-05-25 ENCOUNTER — OFFICE VISIT (OUTPATIENT)
Dept: FAMILY MEDICINE CLINIC | Facility: HOME HEALTHCARE | Age: 57
End: 2022-05-25
Payer: COMMERCIAL

## 2022-05-25 VITALS
HEIGHT: 62 IN | DIASTOLIC BLOOD PRESSURE: 74 MMHG | WEIGHT: 100.4 LBS | HEART RATE: 74 BPM | TEMPERATURE: 98.7 F | SYSTOLIC BLOOD PRESSURE: 112 MMHG | BODY MASS INDEX: 18.48 KG/M2 | OXYGEN SATURATION: 95 % | RESPIRATION RATE: 18 BRPM

## 2022-05-25 DIAGNOSIS — M54.12 CERVICAL RADICULOPATHY: Primary | ICD-10-CM

## 2022-05-25 DIAGNOSIS — Z12.4 SCREENING FOR CERVICAL CANCER: ICD-10-CM

## 2022-05-25 DIAGNOSIS — M79.601 RIGHT ARM PAIN: ICD-10-CM

## 2022-05-25 PROCEDURE — 99213 OFFICE O/P EST LOW 20 MIN: CPT | Performed by: FAMILY MEDICINE

## 2022-05-25 PROCEDURE — 3008F BODY MASS INDEX DOCD: CPT | Performed by: ORTHOPAEDIC SURGERY

## 2022-05-25 NOTE — PROGRESS NOTES
Assessment/Plan:     Diagnoses and all orders for this visit:    Cervical radiculopathy  -     Ambulatory Referral to Physiatry; Future    Right arm pain  -     Ambulatory Referral to Physiatry; Future    Screening for cervical cancer  -     Ambulatory Referral to Obstetrics / Gynecology; Future      - Will refer to physiatry for further evaluation  Continue with pain management and ortho as scheduled  Return in about 3 months (around 8/25/2022) for Medicare Annual Wellness Visit  Subjective:        Patient ID: Shad Scott is a 49 y o  female  Chief Complaint   Patient presents with    Arm Pain     Right arm  Esmer López is a 49year old right handed female with history of chronic back and neck pain and fibromyalgia, presenting with concern for right shoulder pain  This has been an ongoing issue for many years for which she has been following with orthopedics and pain management  Currently following with [facility] in [facility] whom she has been seeing for many years  Pain is managed with meloxicam, Cymbalta, gabapentin, and Percocet  She has recently been seeing Dr Andre Mason with the diagnoses of biceps tendinitis and cervical DDD  She has received corticosteroid injections with transient relief of her pain  Today patient continues to endorse pain in her right shoulder and arm  This pain is worse at night  She does not feel that her current pain regimen is helping him and has seen PT in the past without much improvement  She is frustrated she feels that nothing is being done for her symptoms, and is requesting to see a "specialist"        The following portions of the patient's history were reviewed and updated as appropriate: allergies, current medications, past family history, past medical history, past social history, past surgical history and problem list     Patient Active Problem List   Diagnosis    Allergic rhinitis    Arthritis    Cervical radiculopathy    Neck pain    Chronic pain of right ankle    Depression    Esophageal reflux    Fatigue    Fibromyalgia    Herniated lumbar intervertebral disc    Hypercholesteremia    Hypocalcemia    Hyponatremia    Narcolepsy    Spondylosis of lumbosacral region without myelopathy or radiculopathy    Sinus bradycardia    Primary localized osteoarthrosis of the hip    Vitamin D deficiency    Multiple acquired skin tags    Skin lesion of face  left preauricular    DDD (degenerative disc disease), cervical       Current Outpatient Medications   Medication Sig Dispense Refill    Calcium Carbonate-Vitamin D (CALCIUM 500 + D PO) Take 1 tablet by mouth daily      DULoxetine (CYMBALTA) 30 mg delayed release capsule take 1 capsule by mouth once daily 90 capsule 0    gabapentin (NEURONTIN) 300 mg capsule Take 1 capsule (300 mg total) by mouth 3 (three) times a day 270 capsule 1    GLUCOSAMINE-CHONDROITIN PO Take 2 caplets by mouth daily      lidocaine (LMX) 4 % cream Apply topically as needed for mild pain 30 g 0    meloxicam (MOBIC) 15 mg tablet take 1 tablet by mouth once daily 90 tablet 0    multivitamin (THERAGRAN) TABS Take 1 tablet by mouth daily      oxyCODONE-acetaminophen (PERCOCET) 5-325 mg per tablet take 1 tablet by mouth every 6 hours if needed for severe pain MAX DAILY AMOUNT OF 4 TABLETS      polyethylene glycol (GLYCOLAX) powder Take 17 g by mouth 2 (two) times a day 578 g 3    TiZANidine (ZANAFLEX) 2 MG capsule Take 1 capsule (2 mg total) by mouth 3 (three) times a day as needed for muscle spasms 30 capsule 0     No current facility-administered medications for this visit          Past Medical History:   Diagnosis Date    Anxiety     Back problem     Shoulder pain         Past Surgical History:   Procedure Laterality Date    BREAST BIOPSY Right 2005    COLONOSCOPY      DENTAL IMPLANT      DILATION AND CURETTAGE OF UTERUS      ENDOMETRIAL ABLATION      LEG SURGERY Left  LEG SURGERY      SHOULDER ARTHROSCOPY Right     shoulder        Social History     Socioeconomic History    Marital status: /Civil Union     Spouse name: Not on file    Number of children: Not on file    Years of education: Not on file    Highest education level: Not on file   Occupational History    Not on file   Tobacco Use    Smoking status: Former Smoker     Types: Cigarettes     Quit date: 1991     Years since quittin 3    Smokeless tobacco: Never Used   Vaping Use    Vaping Use: Never used   Substance and Sexual Activity    Alcohol use: Yes     Alcohol/week: 7 0 standard drinks     Types: 7 Glasses of wine per week     Comment: glass of wine nightly    Drug use: No    Sexual activity: Not Currently     Partners: Male   Other Topics Concern    Not on file   Social History Narrative    Not on file     Social Determinants of Health     Financial Resource Strain: Not on file   Food Insecurity: Not on file   Transportation Needs: Not on file   Physical Activity: Not on file   Stress: Not on file   Social Connections: Not on file   Intimate Partner Violence: Not on file   Housing Stability: Not on file        Review of Systems   Constitutional: Negative for chills, diaphoresis and fever  Respiratory: Negative for cough, chest tightness, shortness of breath and wheezing  Cardiovascular: Negative for chest pain, palpitations and leg swelling  Gastrointestinal: Negative for abdominal pain, constipation, diarrhea, nausea and vomiting  Musculoskeletal: Positive for arthralgias, myalgias and neck pain  Skin: Negative for rash and wound  Neurological: Positive for weakness  Negative for dizziness, syncope, light-headedness and headaches           Objective:      /74 (BP Location: Left arm, Patient Position: Sitting, Cuff Size: Adult)   Pulse 74   Temp 98 7 °F (37 1 °C) (Temporal)   Resp 18   Ht 5' 2" (1 575 m)   Wt 45 5 kg (100 lb 6 4 oz)   SpO2 95%   BMI 18 36 kg/m²          Physical Exam  Vitals and nursing note reviewed  Constitutional:       General: She is not in acute distress  Appearance: Normal appearance  HENT:      Head: Normocephalic and atraumatic  Eyes:      Extraocular Movements: Extraocular movements intact  Conjunctiva/sclera: Conjunctivae normal       Pupils: Pupils are equal, round, and reactive to light  Cardiovascular:      Rate and Rhythm: Normal rate and regular rhythm  Heart sounds: Normal heart sounds  No murmur heard  Pulmonary:      Effort: Pulmonary effort is normal  No respiratory distress  Breath sounds: Normal breath sounds  No wheezing  Musculoskeletal:      Right shoulder: Decreased range of motion  Right lower leg: No edema  Left lower leg: No edema  Skin:     General: Skin is warm and dry  Neurological:      General: No focal deficit present  Mental Status: She is alert and oriented to person, place, and time  Cranial Nerves: No cranial nerve deficit     Psychiatric:         Mood and Affect: Mood normal          Behavior: Behavior normal

## 2022-05-31 ENCOUNTER — HOSPITAL ENCOUNTER (EMERGENCY)
Facility: HOSPITAL | Age: 57
Discharge: HOME | End: 2022-05-31
Attending: EMERGENCY MEDICINE | Admitting: EMERGENCY MEDICINE
Payer: COMMERCIAL

## 2022-05-31 VITALS
WEIGHT: 101 LBS | HEIGHT: 62 IN | SYSTOLIC BLOOD PRESSURE: 111 MMHG | TEMPERATURE: 98.6 F | OXYGEN SATURATION: 98 % | HEART RATE: 70 BPM | BODY MASS INDEX: 18.58 KG/M2 | RESPIRATION RATE: 16 BRPM | DIASTOLIC BLOOD PRESSURE: 60 MMHG

## 2022-05-31 DIAGNOSIS — M25.511 CHRONIC RIGHT SHOULDER PAIN: Primary | ICD-10-CM

## 2022-05-31 DIAGNOSIS — M54.12 RIGHT CERVICAL RADICULOPATHY: ICD-10-CM

## 2022-05-31 DIAGNOSIS — G89.29 CHRONIC RIGHT SHOULDER PAIN: Primary | ICD-10-CM

## 2022-05-31 PROCEDURE — 99283 EMERGENCY DEPT VISIT LOW MDM: CPT

## 2022-05-31 PROCEDURE — 99284 EMERGENCY DEPT VISIT MOD MDM: CPT | Performed by: PHYSICIAN ASSISTANT

## 2022-05-31 RX ORDER — PREDNISONE 20 MG/1
40 TABLET ORAL ONCE
Status: COMPLETED | OUTPATIENT
Start: 2022-05-31 | End: 2022-05-31

## 2022-05-31 RX ORDER — PREDNISONE 10 MG/1
TABLET ORAL
Qty: 26 TABLET | Refills: 0 | Status: SHIPPED | OUTPATIENT
Start: 2022-06-01 | End: 2022-06-12

## 2022-05-31 RX ADMIN — PREDNISONE 40 MG: 20 TABLET ORAL at 15:43

## 2022-05-31 NOTE — ED PROVIDER NOTES
History  Chief Complaint   Patient presents with    Shoulder Pain     Patient c/o  right shoulder pain that radiates down arm for months  Patient is unable to schedule appointment with specialist at this time  Denies trauma/fall/injury  49year old female with PMH DJD cervical spine, OA, anxiety presents ambulatory from home for evaluation of continued right sided shoulder and neck pain  She notes she has been dealing with this problem for years  She is frustrated and states no one has been able to tell her what is wrong and why she has so much pain  She has had prior evaluation of symptoms and tells me she was told she would need a shoulder replacement eventually  She states having surgery a number of years ago without benefit  She has been receiving injections but feels they are not as beneficial as they once were  Pain located throughout the shoulder  Reports decreased ROM  Pain also radiates to scapular region as well as mid upper arm  She reports that pain keeps her awake at night  Denies any new injury or trauma  No falls  She has been on NSAIDs and gabapentin but states she doesn't like taking pills  Previously on opioid therapy and has available but hasn't taken  She reports she doesn't like to take pills as she doesn't want to make the pain  She also has DJD of the neck and follows with pain management  No reported alleviating factors  Pt frustrated at inability to fully use arm at her age  Pain aggravated by movement and attempting to lift the arm  She has been to physical therapy without reported benefit  Denies fever, chills  Denies cough, congestion or recent illness  Denies chest pain, SOB, N/V/D, abdominal pain  She reports neuropathy in arms related to her neck issues        History provided by:  Patient and medical records   used: No    Shoulder Pain  Location:  Shoulder  Shoulder location:  R shoulder  Injury: no    Pain details:     Quality:  Aching Radiates to:  R elbow    Progression:  Unchanged  Handedness:  Right-handed  Prior injury to area:  No  Relieved by:  Nothing  Worsened by: Movement  Ineffective treatments:  Acetaminophen, NSAIDs and rest  Associated symptoms: decreased range of motion and neck pain    Associated symptoms: no back pain, no fatigue, no fever, no numbness, no swelling and no tingling    Risk factors: no concern for non-accidental trauma, no known bone disorder, no frequent fractures and no recent illness        Prior to Admission Medications   Prescriptions Last Dose Informant Patient Reported? Taking?    Calcium Carbonate-Vitamin D (CALCIUM 500 + D PO)  Self Yes No   Sig: Take 1 tablet by mouth daily   DULoxetine (CYMBALTA) 30 mg delayed release capsule   No No   Sig: take 1 capsule by mouth once daily   GLUCOSAMINE-CHONDROITIN PO  Self Yes No   Sig: Take 2 caplets by mouth daily   TiZANidine (ZANAFLEX) 2 MG capsule   No No   Sig: Take 1 capsule (2 mg total) by mouth 3 (three) times a day as needed for muscle spasms   gabapentin (NEURONTIN) 300 mg capsule   No No   Sig: Take 1 capsule (300 mg total) by mouth 3 (three) times a day   lidocaine (LMX) 4 % cream  Self No No   Sig: Apply topically as needed for mild pain   meloxicam (MOBIC) 15 mg tablet   No No   Sig: take 1 tablet by mouth once daily   multivitamin (THERAGRAN) TABS  Self Yes No   Sig: Take 1 tablet by mouth daily   oxyCODONE-acetaminophen (PERCOCET) 5-325 mg per tablet   Yes No   Sig: take 1 tablet by mouth every 6 hours if needed for severe pain MAX DAILY AMOUNT OF 4 TABLETS   polyethylene glycol (GLYCOLAX) powder  Self No No   Sig: Take 17 g by mouth 2 (two) times a day      Facility-Administered Medications: None       Past Medical History:   Diagnosis Date    Anxiety     Back problem     Shoulder pain        Past Surgical History:   Procedure Laterality Date    BREAST BIOPSY Right 2005    COLONOSCOPY      DENTAL IMPLANT      DILATION AND CURETTAGE OF UTERUS  ENDOMETRIAL ABLATION      LEG SURGERY Left     LEG SURGERY      SHOULDER ARTHROSCOPY Right     shoulder       Family History   Problem Relation Age of Onset    Colon cancer Paternal Grandmother     Breast cancer Paternal Grandmother 79    Arthritis Family     Diabetes Family     Hypertension Family     Osteoporosis Family     Ovarian cancer Family     Uterine cancer Family     No Known Problems Mother     Heart disease Father     Heart disease Paternal Grandfather     Diabetes Paternal Aunt     No Known Problems Paternal Aunt      I have reviewed and agree with the history as documented  E-Cigarette/Vaping    E-Cigarette Use Never User      E-Cigarette/Vaping Substances    Nicotine No     THC No     CBD No     Flavoring No     Other No     Unknown No      Social History     Tobacco Use    Smoking status: Former Smoker     Types: Cigarettes     Quit date: 1991     Years since quittin 3    Smokeless tobacco: Never Used   Vaping Use    Vaping Use: Never used   Substance Use Topics    Alcohol use: Yes     Alcohol/week: 7 0 standard drinks     Types: 7 Glasses of wine per week     Comment: glass of wine nightly    Drug use: No       Review of Systems   Constitutional: Negative  Negative for chills, fatigue and fever  HENT: Negative  Negative for congestion, rhinorrhea and sore throat  Eyes: Negative  Negative for visual disturbance  Respiratory: Negative  Negative for cough, shortness of breath and wheezing  Cardiovascular: Negative  Negative for chest pain, palpitations and leg swelling  Gastrointestinal: Negative  Negative for abdominal pain, constipation, diarrhea, nausea and vomiting  Genitourinary: Negative  Negative for dysuria, flank pain, frequency and hematuria  Musculoskeletal: Positive for arthralgias and neck pain  Negative for back pain, joint swelling and myalgias  Skin: Negative  Negative for rash  Neurological: Negative    Negative for dizziness, light-headedness, numbness and headaches  Psychiatric/Behavioral: Negative  Negative for confusion  All other systems reviewed and are negative  Physical Exam  Physical Exam  Vitals and nursing note reviewed  Constitutional:       General: She is not in acute distress  Appearance: She is well-developed  She is not toxic-appearing  HENT:      Head: Normocephalic and atraumatic  Right Ear: Hearing and external ear normal       Left Ear: Hearing and external ear normal       Nose: Nose normal       Mouth/Throat:      Mouth: Mucous membranes are moist       Pharynx: Oropharynx is clear  Uvula midline  Eyes:      General: Lids are normal  No scleral icterus  Conjunctiva/sclera: Conjunctivae normal    Neck:      Trachea: Trachea and phonation normal  No tracheal deviation  Cardiovascular:      Rate and Rhythm: Normal rate and regular rhythm  Pulses: Normal pulses  Radial pulses are 2+ on the right side and 2+ on the left side  Heart sounds: Normal heart sounds, S1 normal and S2 normal  No murmur heard  Pulmonary:      Effort: Pulmonary effort is normal  No tachypnea or respiratory distress  Breath sounds: Normal breath sounds  No wheezing or rhonchi  Musculoskeletal:      Right shoulder: Tenderness present  No swelling, deformity, bony tenderness or crepitus  Decreased range of motion  Normal strength  Normal pulse  Right elbow: Normal range of motion  No tenderness  Arms:       Cervical back: Normal range of motion and neck supple  Right lower leg: No edema  Left lower leg: No edema  Comments: No overlying skin changes  Skin:     General: Skin is warm and dry  Capillary Refill: Capillary refill takes less than 2 seconds  Findings: No abrasion, bruising, erythema or rash  Neurological:      General: No focal deficit present  Mental Status: She is alert and oriented to person, place, and time        GCS: GCS eye subscore is 4  GCS verbal subscore is 5  GCS motor subscore is 6  Cranial Nerves: No cranial nerve deficit  Sensory: Sensation is intact  No sensory deficit  Motor: Motor function is intact  No weakness or abnormal muscle tone  Gait: Gait normal       Deep Tendon Reflexes: Reflexes are normal and symmetric  Psychiatric:         Mood and Affect: Mood is anxious  Speech: Speech normal          Behavior: Behavior normal          Vital Signs  ED Triage Vitals   Temperature Pulse Respirations Blood Pressure SpO2   05/31/22 1505 05/31/22 1500 05/31/22 1500 05/31/22 1500 05/31/22 1500   98 6 °F (37 °C) 67 20 131/71 99 %      Temp Source Heart Rate Source Patient Position - Orthostatic VS BP Location FiO2 (%)   05/31/22 1505 05/31/22 1505 05/31/22 1505 05/31/22 1505 --   Temporal Monitor Lying Left arm       Pain Score       --                  Vitals:    05/31/22 1500 05/31/22 1505 05/31/22 1515 05/31/22 1530   BP: 131/71 131/71  111/60   Pulse: 67 65 77 70   Patient Position - Orthostatic VS:  Lying           Visual Acuity      ED Medications  Medications   predniSONE tablet 40 mg (40 mg Oral Given 5/31/22 1543)       Diagnostic Studies  Results Reviewed     None                 No orders to display              Procedures  Procedures         ED Course  ED Course as of 05/31/22 1553   Tue May 31, 2022   1530 Reviewed prior records  Had MRI cervical spine on 7/14/21 which showed multilevel cervical spondylosis, foci C4 and C6 in which a follow up NM scan on 8/10/21 was performed and was negative  She has had a prior xray in April 2021 which showed severe glenohumeral arthritis  Long discussion with pt  I do not feel repeat xray imaging is warranted and I don't think a repeat xray will change our management here  No trauma or new findings and ultimately would not change our managment here today  Has had prior evaluation by orthopedics    She questions if something could be torn, may need outpatient MRI but I think this is more of an arthritic/degenerative process  Pt notes having a new prescription for tizanidine but hasn't been taking  Recommended trialing the muscle relaxant  She also has Rx for opioids at home if needed from her pain management  Will trial a steroid taper - risks/benefits/side effects discussed, no contraindication to brief course of steroid therapy  I did explain to pt that this would not be a long term solution but may give her some temporary relief of pain until she can have her specialist appointments  Reviewed RICE therapy  Continue ROM exercises  Referral placed to comprehensive spine  Strict return precautions outlined  Advised outpatient follow up with orthopedics and pain management or return to ER for change in condition as outlined   Pt verbalized understanding and had no further questions                                          MDM  Number of Diagnoses or Management Options  Chronic right shoulder pain: established and worsening  Right cervical radiculopathy: established and worsening     Amount and/or Complexity of Data Reviewed  Clinical lab tests: reviewed  Tests in the radiology section of CPT®: reviewed  Decide to obtain previous medical records or to obtain history from someone other than the patient: yes  Obtain history from someone other than the patient: yes  Review and summarize past medical records: yes    Patient Progress  Patient progress: stable      Disposition  Final diagnoses:   Chronic right shoulder pain   Right cervical radiculopathy     Time reflects when diagnosis was documented in both MDM as applicable and the Disposition within this note     Time User Action Codes Description Comment    5/31/2022  3:35 PM Fred Chew Add [M25 511,  G89 29] Chronic right shoulder pain     5/31/2022  3:35 PM Fred Chew Add [M54 12] Right cervical radiculopathy       ED Disposition     ED Disposition   Discharge    Condition Stable    Date/Time   Tue May 31, 2022  3:35 PM    Comment   Ligia Dewitt discharge to home/self care  Follow-up Information     Follow up With Specialties Details Why Contact Info Additional Information    [facility] Emergency Medicine  As needed [address] [phone] [address] [facility], [address], [address]          Discharge Medication List as of 5/31/2022  3:39 PM      START taking these medications    Details   predniSONE 10 mg tablet Multiple Dosages:Starting Wed 6/1/2022, Until Thu 6/2/2022 at 2359, THEN Starting Fri 6/3/2022, Until Sun 6/5/2022 at 2359, THEN Starting Mon 6/6/2022, Until Wed 6/8/2022 at 2359, THEN Starting Thu 6/9/2022, Until Sat 6/11/2022 at 2359Take 4 tablets  (40 mg total) by mouth daily for 2 days, THEN 3 tablets (30 mg total) daily for 3 days, THEN 2 tablets (20 mg total) daily for 3 days, THEN 1 tablet (10 mg total) daily for 3 days  , Normal         CONTINUE these medications which have NOT CHANGED    Details   Calcium Carbonate-Vitamin D (CALCIUM 500 + D PO) Take 1 tablet by mouth daily, Historical Med      DULoxetine (CYMBALTA) 30 mg delayed release capsule take 1 capsule by mouth once daily, Normal      gabapentin (NEURONTIN) 300 mg capsule Take 1 capsule (300 mg total) by mouth 3 (three) times a day, Starting Mon 6/28/2021, Normal      GLUCOSAMINE-CHONDROITIN PO Take 2 caplets by mouth daily, Historical Med      lidocaine (LMX) 4 % cream Apply topically as needed for mild pain, Starting Sat 7/6/2019, Print      meloxicam (MOBIC) 15 mg tablet take 1 tablet by mouth once daily, Normal      multivitamin (THERAGRAN) TABS Take 1 tablet by mouth daily, Historical Med      oxyCODONE-acetaminophen (PERCOCET) 5-325 mg per tablet take 1 tablet by mouth every 6 hours if needed for severe pain MAX DAILY AMOUNT OF 4 TABLETS, Historical Med polyethylene glycol (GLYCOLAX) powder Take 17 g by mouth 2 (two) times a day, Starting Tue 3/10/2020, Normal      TiZANidine (ZANAFLEX) 2 MG capsule Take 1 capsule (2 mg total) by mouth 3 (three) times a day as needed for muscle spasms, Starting Sun 12/12/2021, Normal                 PDMP Review       Value Time User    PDMP Reviewed  Yes 12/12/2021 11:07 AM Rosita Mckeon PA-C          ED Provider  Electronically Signed by           Rosita Mckeon PA-C  05/31/22 8354

## 2022-05-31 NOTE — DISCHARGE INSTRUCTIONS
Alternate ice/heat, topical muscle rubs, etc   Take steroid as directed with food for the full duration  Trial your muscle relaxant (tizanidine), especially at night to help with sleep  I would recommend continued outpatient follow up with orthopedics as well as pain/spine to further evaluate your symptoms  Follow up with your PCP or return to ER as needed

## 2022-06-02 ENCOUNTER — TELEPHONE (OUTPATIENT)
Dept: PHYSICAL THERAPY | Facility: OTHER | Age: 57
End: 2022-06-02

## 2022-06-02 DIAGNOSIS — F32.A DEPRESSION, UNSPECIFIED DEPRESSION TYPE: ICD-10-CM

## 2022-06-02 RX ORDER — DULOXETIN HYDROCHLORIDE 30 MG/1
CAPSULE, DELAYED RELEASE ORAL
Qty: 90 CAPSULE | Refills: 0 | Status: SHIPPED | OUTPATIENT
Start: 2022-06-02

## 2022-06-03 ENCOUNTER — TELEPHONE (OUTPATIENT)
Dept: FAMILY MEDICINE CLINIC | Facility: CLINIC | Age: 57
End: 2022-06-03

## 2022-06-08 NOTE — TELEPHONE ENCOUNTER
Patient called in and left a VM on [facility] machine  She is asking for her referral to [facility] be closed  She has an appointment closer to her home, in [place] [address]  Referral was already closed due to no call back   We called [facility]

## 2022-06-08 NOTE — TELEPHONE ENCOUNTER
Message left for patient regarding referral entered for  [facility]  Contact information, hours of operation and VM instructions left  Nurse requested patient to CB if needed and leave Full Name &  on VM       Referral closed per protocol

## 2022-07-24 DIAGNOSIS — M19.90 ARTHRITIS: ICD-10-CM

## 2022-07-25 RX ORDER — MELOXICAM 15 MG/1
TABLET ORAL
Qty: 90 TABLET | Refills: 0 | Status: SHIPPED | OUTPATIENT
Start: 2022-07-25 | End: 2022-10-24

## 2022-07-26 ENCOUNTER — OFFICE VISIT (OUTPATIENT)
Dept: OBGYN CLINIC | Facility: CLINIC | Age: 57
End: 2022-07-26
Payer: COMMERCIAL

## 2022-07-26 VITALS — BODY MASS INDEX: 18.58 KG/M2 | HEIGHT: 62 IN | WEIGHT: 101 LBS

## 2022-07-26 DIAGNOSIS — M24.811 INTERNAL DERANGEMENT OF RIGHT SHOULDER: Primary | ICD-10-CM

## 2022-07-26 PROCEDURE — 99213 OFFICE O/P EST LOW 20 MIN: CPT | Performed by: ORTHOPAEDIC SURGERY

## 2022-07-26 RX ORDER — GABAPENTIN 600 MG/1
TABLET ORAL
COMMUNITY
Start: 2022-07-05

## 2022-07-26 NOTE — PROGRESS NOTES
Assessment/Plan:   Diagnoses and all orders for this visit:    Internal derangement of right shoulder  -     MRI shoulder right wo contrast; Future    Other orders  -     gabapentin (NEURONTIN) 600 MG tablet; TAKE 1 TABLET BY [facility] IN THE MORING AND 1 BEFORE BEDTIME  -     Diclofenac Sodium (VOLTAREN) 1 %; Apply topically Three times a day    Reviewed today's physical exam findings with patient at time of visit  Given her history of moderate to severe glenohumeral osteoarthritis, biceps tendinitis, and degenerative disc disease of her C-spine, physical exam long is hard to distinguish between symptoms that may be emanating from her neck versus other specific shoulder pathology  She is being provided a referral for MRI for further evaluation of the right shoulder  Continue activities as tolerated without limitations or restrictions at this time  She will be seen for follow-up after MRI is complete  Patient presents in standing is in agreement with this treatment plan  The patient has diffuse pain along her right shoulder  She is also having pain along her neck  She does receive injections for that  Due to her lack of progression with injections and therapy, an MRI is ordered of her shoulder  Return back once this is complete  Physical examination shows diffuse tenderness  Rotator cuff strength testing is 4/5  No gross instability    Subjective:   Patient ID: Elvira Palma  1965     HPI  Patient is a 49 y o  RHD female who presents for follow-up evaluation of ongoing right shoulder pain  She was last seen specifically in regards to her shoulder on 4/11/2022 at which time she seems to be suffering from tendinitis of the proximal biceps tendon and she was provided with a cortisone injection    Since that time, she says that she has follow-up with [facility], and has received injections in her cervical spine, and although these were effective at alleviating some of her symptoms, it did not result in complete resolution  On today's presentation she reports aching pain and difficulty with motions above shoulder height  She says that the pain sometimes radiates down past the level of the elbow  She denies any associated numbness or tingling  She reports weakness with caring objects of weight  Denies any associated bruising or swelling  The following portions of the patient's history were reviewed and updated as appropriate:  Past medical history, past surgical history, Family history, social history, current medications and allergies    Past Medical History:   Diagnosis Date    Anxiety     Back problem     Shoulder pain        Past Surgical History:   Procedure Laterality Date    BREAST BIOPSY Right 2005    COLONOSCOPY      DENTAL IMPLANT      DILATION AND CURETTAGE OF UTERUS      ENDOMETRIAL ABLATION      LEG SURGERY Left     LEG SURGERY      SHOULDER ARTHROSCOPY Right     shoulder       Family History   Problem Relation Age of Onset    Colon cancer Paternal Grandmother     Breast cancer Paternal Grandmother 79    Arthritis Family     Diabetes Family     Hypertension Family     Osteoporosis Family     Ovarian cancer Family     Uterine cancer Family     No Known Problems Mother     Heart disease Father     Heart disease Paternal Grandfather     Diabetes Paternal Aunt     No Known Problems Paternal Aunt        Social History     Socioeconomic History    Marital status: /Civil Union     Spouse name: None    Number of children: None    Years of education: None    Highest education level: None   Occupational History    None   Tobacco Use    Smoking status: Former Smoker     Types: Cigarettes     Quit date: 1991     Years since quittin 5    Smokeless tobacco: Never Used   Vaping Use    Vaping Use: Never used   Substance and Sexual Activity    Alcohol use:  Yes     Alcohol/week: 7 0 standard drinks     Types: 7 Glasses of wine per week     Comment: glass of wine nightly    Drug use: No    Sexual activity: Not Currently     Partners: Male   Other Topics Concern    None   Social History Narrative    None     Social Determinants of Health     Financial Resource Strain: Not on file   Food Insecurity: Not on file   Transportation Needs: Not on file   Physical Activity: Not on file   Stress: Not on file   Social Connections: Not on file   Intimate Partner Violence: Not on file   Housing Stability: Not on file         Current Outpatient Medications:     Calcium Carbonate-Vitamin D (CALCIUM 500 + D PO), Take 1 tablet by mouth daily, Disp: , Rfl:     Diclofenac Sodium (VOLTAREN) 1 %, Apply topically Three times a day, Disp: , Rfl:     DULoxetine (CYMBALTA) 30 mg delayed release capsule, take 1 capsule by mouth once daily, Disp: 90 capsule, Rfl: 0    gabapentin (NEURONTIN) 600 MG tablet, TAKE 1 TABLET BY [facility] IN THE MORING AND 1 BEFORE BEDTIME, Disp: , Rfl:     GLUCOSAMINE-CHONDROITIN PO, Take 2 caplets by mouth daily, Disp: , Rfl:     lidocaine (LMX) 4 % cream, Apply topically as needed for mild pain, Disp: 30 g, Rfl: 0    meloxicam (MOBIC) 15 mg tablet, take 1 tablet by mouth once daily, Disp: 90 tablet, Rfl: 0    multivitamin (THERAGRAN) TABS, Take 1 tablet by mouth daily, Disp: , Rfl:     oxyCODONE-acetaminophen (PERCOCET) 5-325 mg per tablet, take 1 tablet by mouth every 6 hours if needed for severe pain MAX DAILY AMOUNT OF 4 TABLETS, Disp: , Rfl:     polyethylene glycol (GLYCOLAX) powder, Take 17 g by mouth 2 (two) times a day, Disp: 578 g, Rfl: 3    TiZANidine (ZANAFLEX) 2 MG capsule, Take 1 capsule (2 mg total) by mouth 3 (three) times a day as needed for muscle spasms, Disp: 30 capsule, Rfl: 0    gabapentin (NEURONTIN) 300 mg capsule, Take 1 capsule (300 mg total) by mouth 3 (three) times a day (Patient not taking: Reported on 7/26/2022), Disp: 270 capsule, Rfl: 1    Allergies   Allergen Reactions    Amitriptyline      Other reaction(s): Constipation, Dry mouth       Review of Systems   Constitutional: Negative for chills, fever and unexpected weight change  HENT: Negative for hearing loss, nosebleeds and sore throat  Eyes: Negative for pain, redness and visual disturbance  Respiratory: Negative for cough, shortness of breath and wheezing  Cardiovascular: Negative for chest pain, palpitations and leg swelling  Gastrointestinal: Negative for abdominal pain, nausea and vomiting  Endocrine: Negative for polydipsia and polyuria  Genitourinary: Negative for dysuria and hematuria  Musculoskeletal:        As noted in HPI   Skin: Negative for rash and wound  Neurological: Negative for dizziness, numbness and headaches  Psychiatric/Behavioral: Negative for decreased concentration and suicidal ideas  The patient is not nervous/anxious  Objective:  Ht 5' 2" (1 575 m)   Wt 45 8 kg (101 lb)   BMI 18 47 kg/m²     Ortho Exam  Right shoulder -   No anatomical deformity  Skin is warm and dry to touch with no signs of erythema, ecchymosis, or infection  No soft tissue swelling or effusion noted  + palpable crepitus with passive motion  TTP over long head biceps tendon and bicipital groove, TTP over rotator cuff insertion, nontender over AC joint or acromion  ROM  FF 0°-130°, ABD 0°-130°, patient can achieve full 180° of forward flexion and abduction, ER 0°-80°  MMT 4/5 throughout  - glenohumeral instability appreciated on exam  - Neer's, + Arvizu  + Speed's, - Yergason's  + empty can, - drop-arm, + belly press, + resisted external rotation, - lift-off  Demonstrates normal elbow, wrist, and finger motion  2+ distal radial pulse with brisk capillary refill to the fingers  Radial, median, and ulnar motor and sensory distributions intact  Sensation light touch intact distally      Physical Exam  Vitals and nursing note reviewed  Constitutional:       General: She is not in acute distress  Appearance: She is well-developed     HENT: Head: Normocephalic and atraumatic  Eyes:      Conjunctiva/sclera: Conjunctivae normal    Cardiovascular:      Rate and Rhythm: Normal rate  Pulmonary:      Effort: Pulmonary effort is normal    Musculoskeletal:      Cervical back: Neck supple  Skin:     General: Skin is warm and dry  Capillary Refill: Capillary refill takes less than 2 seconds  Neurological:      Mental Status: She is alert and oriented to person, place, and time     Psychiatric:         Mood and Affect: Mood normal          Behavior: Behavior normal         Diagnostic Test Review:  No new imaging reviewed this visit    Procedures   No procedures performed this visit    Scribe Attestation    I,:  Jamar Saeed am acting as a scribe while in the presence of the attending physician :       I,:  Anthony Sutherland, DO personally performed the services described in this documentation    as scribed in my presence :

## 2022-07-28 ENCOUNTER — TELEPHONE (OUTPATIENT)
Dept: NEUROLOGY | Facility: CLINIC | Age: 57
End: 2022-07-28

## 2022-08-03 ENCOUNTER — HOSPITAL ENCOUNTER (OUTPATIENT)
Dept: MRI IMAGING | Facility: HOSPITAL | Age: 57
Discharge: HOME | End: 2022-08-03
Attending: ORTHOPAEDIC SURGERY
Payer: COMMERCIAL

## 2022-08-03 DIAGNOSIS — M24.811 INTERNAL DERANGEMENT OF RIGHT SHOULDER: ICD-10-CM

## 2022-08-03 PROCEDURE — 73221 MRI JOINT UPR EXTREM W/O DYE: CPT

## 2022-08-03 PROCEDURE — G1004 CDSM NDSC: HCPCS

## 2022-08-04 ENCOUNTER — CONSULT (OUTPATIENT)
Dept: NEUROLOGY | Facility: CLINIC | Age: 57
End: 2022-08-04
Payer: COMMERCIAL

## 2022-08-04 VITALS
RESPIRATION RATE: 12 BRPM | HEIGHT: 62 IN | BODY MASS INDEX: 18.2 KG/M2 | HEART RATE: 70 BPM | TEMPERATURE: 96.3 F | DIASTOLIC BLOOD PRESSURE: 58 MMHG | SYSTOLIC BLOOD PRESSURE: 112 MMHG | WEIGHT: 98.9 LBS

## 2022-08-04 DIAGNOSIS — M25.511 CHRONIC RIGHT SHOULDER PAIN: ICD-10-CM

## 2022-08-04 DIAGNOSIS — M19.019 OA (OSTEOARTHRITIS) OF SHOULDER: ICD-10-CM

## 2022-08-04 DIAGNOSIS — M54.12 CERVICAL RADICULOPATHY: Primary | ICD-10-CM

## 2022-08-04 DIAGNOSIS — M54.12 CERVICAL RADICULOPATHY: ICD-10-CM

## 2022-08-04 DIAGNOSIS — R29.3 POSTURE IMBALANCE: Primary | ICD-10-CM

## 2022-08-04 DIAGNOSIS — G89.29 CHRONIC RIGHT SHOULDER PAIN: ICD-10-CM

## 2022-08-04 DIAGNOSIS — M79.601 RIGHT ARM PAIN: ICD-10-CM

## 2022-08-04 PROCEDURE — 99204 OFFICE O/P NEW MOD 45 MIN: CPT | Performed by: STUDENT IN AN ORGANIZED HEALTH CARE EDUCATION/TRAINING PROGRAM

## 2022-08-04 RX ORDER — TIZANIDINE HYDROCHLORIDE 2 MG/1
2 CAPSULE, GELATIN COATED ORAL 3 TIMES DAILY PRN
Qty: 90 CAPSULE | Refills: 0 | Status: SHIPPED | OUTPATIENT
Start: 2022-08-04

## 2022-08-04 NOTE — PATIENT INSTRUCTIONS
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

## 2022-08-04 NOTE — PROGRESS NOTES
Physical Medicine & Rehabilitation New patient Evaluation  Shreya Dewitt 49 y o  female      HPI/SUBJECTIVE: Theresa Bledsoe 49 y o  female right handed, with  has a past medical history of Anxiety, Back problem, and Shoulder pain  Old records were reviewed personally  Ms Lynette Santillan has a long history of pain and had recently seen orthoapedics for her right shoulder  She has most of her pain in the right arm worse with activities, lifting  It is better with rest and more specifically while lying downand has not found anything specific to be significantly helpful in reducing her Pain  She sees Dr Gilbert Landon and also gets cervical injections/trigger point injections which have helped  She has followed with pain management for the last 14 years  She also takes Meloxicam and Neurontin 600mg BID  Has had several imaging studies showing suspicious lesions within the bones  Had a PET scan in the last couple years, states it was negative  Had an MRI recently on the shoulder with concern as well for suspicious bone lesions  She is aware of the formal reading and we discussed that despite this potentially being completely benign, that it is important to follow up with her PCP about  The imaging report and review of imaging reveal significant pathology of the shoulder and the patient wants a shoulder replacement possibility if it is deemed appropriate  See read below  Has had chronic balance issues without saddle numbness, bowel or bladder incontinence, or saddle numbness  She also endorses interscapular burning pain 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  This is usually temporary but a source for some pain  MRI SHOULDER RIGHT WO CONTRAST     INDICATION:   [id number]: Other specific joint derangements of right shoulder, not elsewhere classified      COMPARISON:  Plain film dated 4/7/2021    Prior bone scan 8/10/2021 was also reviewed      TECHNIQUE:   The following MR sequences were obtained of the right shoulder: Localizer, axial GRE/PD fat sat, oblique coronal T2 fat sat, oblique sagittal T1/T2 fat sat       Gadolinium was not used      FINDINGS:     SUBCUTANEOUS TISSUES: Normal     JOINT EFFUSION: Prominent joint effusion with reactive synovitis      ACROMION PROCESS: Suspected acromioplasty without recurrent spur      ROTATOR CUFF: Prominent subscapularis, supraspinatus and infraspinatus insertional tendinosis with extensive longitudinal interstitial tearing identified  There is prominent irregularity proximal to the supraspinatus insertion best seen on series 4   image 8 concerning for prominent tear with mild tendon retraction  No secondary signs of chronicity      SUBACROMIAL/SUBDELTOID BURSA: Prominent complex bursal fluid with synovial reaction       LONG HEAD OF BICEPS TENDON: The patient is status post button tenodesis, grossly intact      GLENOID LABRUM: Diffuse degeneration and tearing throughout the glenoid labrum      GLENOHUMERAL JOINT: Advanced glenohumeral degenerative osteoarthritis with extensive cartilage loss, subcortical cystic change and productive spurring      ACROMIOCLAVICULAR JOINT:  There is mild osteoarthritis      BONES: Multiple bone lesions are identified throughout the humeral diaphysis raising suspicion for metastatic disease, largest measuring up to 1 5 x 1 4 x 2 2 cm  Previous bone scan was negative  Additional bone lesions identified within the coracoid   process anteriorly best seen on series 5 image 12      IMPRESSION:  1   Multiple right humeral and coracoid process bone lesions raising suspicion for metastatic disease  2   Subscapularis, supraspinatus and infraspinatus insertional tendinosis with suspected large supraspinatus tendon tear proximal to the tendon insertion  No secondary signs of chronicity  3   Advanced glenohumeral degenerative change with extensive full-thickness cartilage loss, subcortical cystic change and productive spurring    Associated joint effusion with complex synovitis  4   Complex subacromial subdeltoid bursitis with synovial thickening demonstrated  5   Status post button biceps tenodesis appearing grossly intact  ASSESSMENT/PLAN:     Lili Mixon was seen today for cervical radiculopathy  Diagnoses and all orders for this visit:    Posture imbalance  -     Ambulatory Referral to Physical Therapy; Future    Cervical radiculopathy  -     Ambulatory Referral to Physiatry  -     Ambulatory Referral to Physical Therapy; Future    Right arm pain  -     Ambulatory Referral to Physiatry  -     Ambulatory Referral to Physical Therapy; Future    Chronic right shoulder pain  -     TiZANidine (ZANAFLEX) 2 MG capsule; Take 1 capsule (2 mg total) by mouth 3 (three) times a day as needed for muscle spasms    OA (osteoarthritis) of shoulder  -     TiZANidine (ZANAFLEX) 2 MG capsule; Take 1 capsule (2 mg total) by mouth 3 (three) times a day as needed for muscle spasms    Ms Silvio Mcdonald presents with pain over her deltoid tuberisty which could be explained by her rotator cuff pathology seen on MRI and pain on examination with passive ROM and strength testing  I highly recommended she follows up with her PCP or orthopaedic surgeon to discuss next steps about the humeral lesion and also about potential treatments/surgery for the shoulder if deemed appropriate  She is amenable to an intraarticular steroid injection vs surgical intervention  A joint injection may be helpful in allowing her to tolerate physical therapy better  I wrote a prescription for Aquatherapy in addition to Tizanidine  Aqua therapy and 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  I believe her posture is affecting her with the pain between the scapulae over the rhomboids with prolonged sitting/computer use  If this can be addressed it can help with her neck pain as well  Review of Systems   Constitutional: Negative  Negative for appetite change and fever  HENT: Negative  Negative for hearing loss, tinnitus, trouble swallowing and voice change  Eyes: Negative  Negative for photophobia and pain  Respiratory: Negative  Negative for shortness of breath  Cardiovascular: Negative  Negative for palpitations  Gastrointestinal: Negative  Negative for nausea and vomiting  Endocrine: Negative  Negative for cold intolerance  Genitourinary: Negative  Negative for dysuria, frequency and urgency  Musculoskeletal: Negative  Negative for myalgias and neck pain  Skin: Negative  Negative for rash  Neurological: Negative  Negative for dizziness, tremors, seizures, syncope, facial asymmetry, speech difficulty, weakness, light-headedness, numbness and headaches  Positive for balance deficits  Hematological: Negative  Does not bruise/bleed easily  Psychiatric/Behavioral: Negative  Negative for confusion, hallucinations and sleep disturbance  All other systems reviewed and are negative  ROS personally reviewed and updated    OBJECTIVE:   /58 (BP Location: Left arm, Patient Position: Standing, Cuff Size: Standard)   Pulse 70   Temp (!) 96 3 °F (35 7 °C) (Temporal)   Resp 12   Ht 5' 2" (1 575 m)   Wt 44 9 kg (98 lb 14 4 oz)   BMI 18 09 kg/m²      Physical Exam  Constitutional:       General: She is not in acute distress  Appearance: She is not ill-appearing  HENT:      Head: Normocephalic and atraumatic  Right Ear: External ear normal       Left Ear: External ear normal       Nose: Nose normal  No rhinorrhea  Mouth/Throat:      Mouth: Mucous membranes are moist       Pharynx: Oropharynx is clear  Eyes:      General: No scleral icterus  Right eye: No discharge  Left eye: No discharge  Extraocular Movements: Extraocular movements intact  Cardiovascular:      Rate and Rhythm: Normal rate  Pulses: Normal pulses  Pulmonary:      Effort: Pulmonary effort is normal  No respiratory distress     Abdominal:      General: There is no distension  Palpations: Abdomen is soft  Musculoskeletal:      Cervical back: Normal range of motion  Right lower leg: No edema  Left lower leg: No edema  Comments: Pain limiting right shoulder passive and active ROM, unable to adequately do overhead motion   Skin:     General: Skin is warm and dry  Neurological:      Mental Status: She is alert and oriented to person, place, and time  Sensory: No sensory deficit  Motor: No weakness        Gait: Gait normal       Comments: Negative hoffmans and tromners   Psychiatric:         Behavior: Behavior normal       Comments: Frustrated       Imaging: I personally reviewed pertinent imaging    Labs: Personally reviewed  Lab Results   Component Value Date    WBC 8 76 12/02/2020    HGB 13 3 12/02/2020    HCT 39 6 12/02/2020    MCV 93 12/02/2020     12/02/2020     Lab Results   Component Value Date    GLUCOSE 78 07/27/2015    CALCIUM 9 3 12/02/2020     05/24/2018    K 4 0 12/02/2020    CO2 31 12/02/2020     12/02/2020    BUN 6 12/02/2020    CREATININE 0 52 (L) 12/02/2020         The following portions of the patient's history were reviewed and updated as appropriate: allergies, current medications, past family history, past medical history, past social history, past surgical history and problem list     Past Medical History:   Diagnosis Date    Anxiety     Back problem     Shoulder pain        Patient Active Problem List    Diagnosis Date Noted    DDD (degenerative disc disease), cervical 06/28/2021    Skin lesion of face  left preauricular 05/02/2021    Multiple acquired skin tags 03/30/2021    Hypercholesteremia 08/11/2017    Chronic pain of right ankle 04/04/2017    Allergic rhinitis 01/07/2016    Fatigue 06/03/2014    Vitamin D deficiency 06/03/2014    Hypocalcemia 12/03/2013    Hyponatremia 12/03/2013    Spondylosis of lumbosacral region without myelopathy or radiculopathy 12/03/2013    Sinus bradycardia 12/03/2013    Herniated lumbar intervertebral disc 09/25/2013    Primary localized osteoarthrosis of the hip 09/25/2013    Narcolepsy 09/03/2013    Arthritis 11/06/2012    Fibromyalgia 07/03/2012    Cervical radiculopathy 06/19/2012    Depression 06/14/2012    Esophageal reflux 06/14/2012    Neck pain 04/26/2011       Past Surgical History:   Procedure Laterality Date    BREAST BIOPSY Right 2005    COLONOSCOPY      DENTAL IMPLANT      DILATION AND CURETTAGE OF UTERUS      ENDOMETRIAL ABLATION      LEG SURGERY Left     LEG SURGERY      SHOULDER ARTHROSCOPY Right     shoulder       Family History   Problem Relation Age of Onset    Colon cancer Paternal Grandmother     Breast cancer Paternal Grandmother 79    Arthritis Family     Diabetes Family     Hypertension Family     Osteoporosis Family     Ovarian cancer Family     Uterine cancer Family     No Known Problems Mother     Heart disease Father     Heart disease Paternal Grandfather     Diabetes Paternal Aunt     No Known Problems Paternal Aunt        Social History     Allergies   Allergen Reactions    Amitriptyline      Other reaction(s): Constipation, Dry mouth         Current Outpatient Medications:     Calcium Carbonate-Vitamin D (CALCIUM 500 + D PO), Take 1 tablet by mouth daily, Disp: , Rfl:     Diclofenac Sodium (VOLTAREN) 1 %, Apply topically Three times a day, Disp: , Rfl:     DULoxetine (CYMBALTA) 30 mg delayed release capsule, take 1 capsule by mouth once daily, Disp: 90 capsule, Rfl: 0    gabapentin (NEURONTIN) 600 MG tablet, TAKE 1 TABLET BY [facility] IN THE MORING AND 1 BEFORE BEDTIME, Disp: , Rfl:     GLUCOSAMINE-CHONDROITIN PO, Take 2 caplets by mouth daily, Disp: , Rfl:     lidocaine (LMX) 4 % cream, Apply topically as needed for mild pain, Disp: 30 g, Rfl: 0    meloxicam (MOBIC) 15 mg tablet, take 1 tablet by mouth once daily, Disp: 90 tablet, Rfl: 0    multivitamin (THERAGRAN) TABS, Take 1 tablet by mouth daily, Disp: , Rfl:     oxyCODONE-acetaminophen (PERCOCET) 5-325 mg per tablet, take 1 tablet by mouth every 6 hours if needed for severe pain MAX DAILY AMOUNT OF 4 TABLETS, Disp: , Rfl:     polyethylene glycol (GLYCOLAX) powder, Take 17 g by mouth 2 (two) times a day, Disp: 578 g, Rfl: 3    TiZANidine (ZANAFLEX) 2 MG capsule, Take 1 capsule (2 mg total) by mouth 3 (three) times a day as needed for muscle spasms, Disp: 90 capsule, Rfl: 0    gabapentin (NEURONTIN) 300 mg capsule, Take 1 capsule (300 mg total) by mouth 3 (three) times a day (Patient not taking: No sig reported), Disp: 270 capsule, Rfl: Drake Harris 405, DO  [facility]

## 2022-08-08 ENCOUNTER — TELEPHONE (OUTPATIENT)
Dept: NEUROLOGY | Facility: CLINIC | Age: 57
End: 2022-08-08

## 2022-08-08 NOTE — TELEPHONE ENCOUNTER
pt left vm stating that she had an appt with you on 8/4 but she doesn't see AVS in [facility] and wondering why     [phone]    Called pt and made her aware that office not is not complete yet and once office note is completed she will be able to see note and AVS

## 2022-08-10 ENCOUNTER — TELEPHONE (OUTPATIENT)
Dept: OBGYN CLINIC | Facility: HOSPITAL | Age: 57
End: 2022-08-10

## 2022-08-10 NOTE — TELEPHONE ENCOUNTER
I spoke with patient and information above relayed  She will keep going with her treatment plan and if pain symptoms are worsening she will go to [facility] or ER for evaluation and treatment  She will awaiting 's evaluation of MRI  The patient returns for recheck. Her nose is healed beautifully and she is very pleased. Long-term wound care as discussed and we will now see her for recheck on a when necessary basis. She will carefully watch for signs of new malignancies on her face.

## 2022-08-10 NOTE — TELEPHONE ENCOUNTER
DR Peraza Clementina  RE: MRI results  CB:     Patient called stating she saw her MRI results in [facility] and she is very concerned  She is wondering why she did not receive a call in regard to results  She is hoping to be seen ASAP or receive a call to discuss the plan moving forward   Caller asked to speak to clinical team

## 2022-08-16 NOTE — TELEPHONE ENCOUNTER
Patient called back stating that Dr Braulio Stafford stated he was going to call her around [address] [facility] he is treating patients and will call her when available

## 2022-08-16 NOTE — TELEPHONE ENCOUNTER
Patient calling back in regard to MRI results  She states that she has seen them and they are somewhat concerning to her  She is asking if Dr Murdock Sees can call to discuss the MRI results with her and what plan will be moving forward      CB: [phone]

## 2022-08-25 ENCOUNTER — TELEPHONE (OUTPATIENT)
Dept: NEUROLOGY | Facility: CLINIC | Age: 57
End: 2022-08-25

## 2022-08-30 ENCOUNTER — OFFICE VISIT (OUTPATIENT)
Dept: OBGYN CLINIC | Facility: CLINIC | Age: 57
End: 2022-08-30
Payer: COMMERCIAL

## 2022-08-30 VITALS
WEIGHT: 98 LBS | HEART RATE: 67 BPM | SYSTOLIC BLOOD PRESSURE: 106 MMHG | BODY MASS INDEX: 18.03 KG/M2 | HEIGHT: 62 IN | DIASTOLIC BLOOD PRESSURE: 70 MMHG

## 2022-08-30 DIAGNOSIS — M19.011 OSTEOARTHRITIS OF GLENOHUMERAL JOINT, RIGHT: Primary | ICD-10-CM

## 2022-08-30 PROCEDURE — 20610 DRAIN/INJ JOINT/BURSA W/O US: CPT | Performed by: ORTHOPAEDIC SURGERY

## 2022-08-30 PROCEDURE — 99213 OFFICE O/P EST LOW 20 MIN: CPT | Performed by: ORTHOPAEDIC SURGERY

## 2022-08-30 RX ORDER — BUPIVACAINE HYDROCHLORIDE 2.5 MG/ML
4 INJECTION, SOLUTION INFILTRATION; PERINEURAL
Status: COMPLETED | OUTPATIENT
Start: 2022-08-30 | End: 2022-08-30

## 2022-08-30 RX ORDER — TRIAMCINOLONE ACETONIDE 40 MG/ML
40 INJECTION, SUSPENSION INTRA-ARTICULAR; INTRAMUSCULAR
Status: COMPLETED | OUTPATIENT
Start: 2022-08-30 | End: 2022-08-30

## 2022-08-30 RX ADMIN — BUPIVACAINE HYDROCHLORIDE 4 ML: 2.5 INJECTION, SOLUTION INFILTRATION; PERINEURAL at 12:15

## 2022-08-30 RX ADMIN — TRIAMCINOLONE ACETONIDE 40 MG: 40 INJECTION, SUSPENSION INTRA-ARTICULAR; INTRAMUSCULAR at 12:15

## 2022-08-30 NOTE — PROGRESS NOTES
Assessment/Plan:   Diagnoses and all orders for this visit:    Osteoarthritis of glenohumeral joint, right  -     Large joint arthrocentesis: R glenohumeral  -     Ambulatory referral to Orthopedic Surgery; Future    Reviewed today's physical exam findings and recent MRI findings with patient at time of visit  Educated the patient that the tears and degenerative findings in her shoulder based on imaging and physical exam are not want that will heal themselves  It is very likely that her discomfort is emanating from osteoarthritis of the shoulder and rotator cuff pathology even though she tends to feel more for pain in the upper brachial region  Previous injections to the subacromial bursa did not provide significant relief, so today she was offered, and excepted a Kenalog and Marcaine injection to the right glenohumeral joint for relief of pain and inflammation  Patient tolerated treatment well  Discussed with patient that if she is interested in pursuing surgical intervention, a corticosteroid injection provided today will preclude her from being able to participate in the surgery for 3 months from today  She is understanding of this and moved forward with the injection today all the same  Due to her previous medical and surgical history is, she is being provided a referral to Orthopedic especially for further consultation regarding a shoulder replacement surgery  The patient's MRI and bone scan results were reviewed  There appears to be no evidence of any metastatic disease  Appears to be purely arthritic in nature  The patient is complaining of pain along her shoulder  She is requesting shoulder replacement surgery  I did explain to the patient that do not perform that procedure anymore  The glenohumeral joint was injected with Kenalog and Marcaine  She will be referred to a shoulder specialist regarding her request   Return back on as-needed basis    If her condition changes, she will not hesitate to let us know    Subjective:   Patient ID: Eugene Diaz  1965     HPI  Patient is a 56 y o  female who presents for  evaluation after her MRI of the right shoulder    The following portions of the patient's history were reviewed and updated as appropriate:  Past medical history, past surgical history, Family history, social history, current medications and allergies    Past Medical History:   Diagnosis Date    Anxiety     Back problem     Shoulder pain        Past Surgical History:   Procedure Laterality Date    BREAST BIOPSY Right 2005    COLONOSCOPY      DENTAL IMPLANT      DILATION AND CURETTAGE OF UTERUS      ENDOMETRIAL ABLATION      LEG SURGERY Left     LEG SURGERY      SHOULDER ARTHROSCOPY Right     shoulder       Family History   Problem Relation Age of Onset    Colon cancer Paternal Grandmother     Breast cancer Paternal Grandmother 79    Arthritis Family     Diabetes Family     Hypertension Family     Osteoporosis Family     Ovarian cancer Family     Uterine cancer Family     No Known Problems Mother     Heart disease Father     Heart disease Paternal Grandfather     Diabetes Paternal Aunt     No Known Problems Paternal Aunt        Social History     Socioeconomic History    Marital status: /Civil Union     Spouse name: None    Number of children: None    Years of education: None    Highest education level: None   Occupational History    None   Tobacco Use    Smoking status: Former Smoker     Types: Cigarettes     Quit date: 1991     Years since quittin 6    Smokeless tobacco: Never Used   Vaping Use    Vaping Use: Never used   Substance and Sexual Activity    Alcohol use:  Yes     Alcohol/week: 7 0 standard drinks     Types: 7 Glasses of wine per week     Comment: glass of wine nightly    Drug use: No    Sexual activity: Not Currently     Partners: Male   Other Topics Concern    None   Social History Narrative    None     Social Determinants of Health     Financial Resource Strain: Not on file   Food Insecurity: Not on file   Transportation Needs: Not on file   Physical Activity: Not on file   Stress: Not on file   Social Connections: Not on file   Intimate Partner Violence: Not on file   Housing Stability: Not on file         Current Outpatient Medications:     Calcium Carbonate-Vitamin D (CALCIUM 500 + D PO), Take 1 tablet by mouth daily, Disp: , Rfl:     Diclofenac Sodium (VOLTAREN) 1 %, Apply topically Three times a day, Disp: , Rfl:     DULoxetine (CYMBALTA) 30 mg delayed release capsule, take 1 capsule by mouth once daily, Disp: 90 capsule, Rfl: 0    gabapentin (NEURONTIN) 600 MG tablet, TAKE 1 TABLET BY [facility] IN THE MORING AND 1 BEFORE BEDTIME, Disp: , Rfl:     GLUCOSAMINE-CHONDROITIN PO, Take 2 caplets by mouth daily, Disp: , Rfl:     lidocaine (LMX) 4 % cream, Apply topically as needed for mild pain, Disp: 30 g, Rfl: 0    meloxicam (MOBIC) 15 mg tablet, take 1 tablet by mouth once daily, Disp: 90 tablet, Rfl: 0    multivitamin (THERAGRAN) TABS, Take 1 tablet by mouth daily, Disp: , Rfl:     oxyCODONE-acetaminophen (PERCOCET) 5-325 mg per tablet, take 1 tablet by mouth every 6 hours if needed for severe pain MAX DAILY AMOUNT OF 4 TABLETS, Disp: , Rfl:     polyethylene glycol (GLYCOLAX) powder, Take 17 g by mouth 2 (two) times a day, Disp: 578 g, Rfl: 3    TiZANidine (ZANAFLEX) 2 MG capsule, Take 1 capsule (2 mg total) by mouth 3 (three) times a day as needed for muscle spasms, Disp: 90 capsule, Rfl: 0    gabapentin (NEURONTIN) 300 mg capsule, Take 1 capsule (300 mg total) by mouth 3 (three) times a day (Patient not taking: No sig reported), Disp: 270 capsule, Rfl: 1    Allergies   Allergen Reactions    Amitriptyline      Other reaction(s): Constipation, Dry mouth       Review of Systems   Constitutional: Negative for chills, fever and unexpected weight change     HENT: Negative for hearing loss, nosebleeds and sore throat  Eyes: Negative for pain, redness and visual disturbance  Respiratory: Negative for cough, shortness of breath and wheezing  Cardiovascular: Negative for chest pain, palpitations and leg swelling  Gastrointestinal: Negative for abdominal pain, nausea and vomiting  Endocrine: Negative for polydipsia and polyuria  Genitourinary: Negative for dysuria and hematuria  Musculoskeletal:        As noted in HPI   Skin: Negative for rash and wound  Neurological: Negative for dizziness, numbness and headaches  Psychiatric/Behavioral: Negative for decreased concentration and suicidal ideas  The patient is not nervous/anxious  Objective:  /70   Pulse 67   Ht 5' 2" (1 575 m)   Wt 44 5 kg (98 lb)   BMI 17 92 kg/m²     Ortho Exam  Right shoulder -  no anatomical deformity  Skin is warm and dry to touch with no signs of erythema, ecchymosis, or infection  No soft tissue swelling or effusion noted  Yes palpable crepitus with passive motion  TTP glenohumeral joint region  ROM  FF 95°, ABD 95°, ER 35 degree, IR sacral  MMT   No glenohumeral instability appreciated on exam  No Neer's, negative Arvizu  Negative Speed's, negative Yergason's  Negative empty can, negative drop-arm, negative belly press, neg resisted external rotation, neg lift-off  Demonstrates normal elbow, wrist, and finger motion  2+ distal radial pulse with brisk capillary refill to the fingers  Radial, median, and ulnar motor and sensory distributions intact  Sensation to light touch intact distally      Physical Exam  Vitals and nursing note reviewed  Constitutional:       General: She is not in acute distress  Appearance: She is well-developed  HENT:      Head: Normocephalic and atraumatic  Eyes:      Conjunctiva/sclera: Conjunctivae normal    Cardiovascular:      Rate and Rhythm: Normal rate  Pulmonary:      Effort: Pulmonary effort is normal    Musculoskeletal:      Cervical back: Neck supple     Skin: General: Skin is warm and dry  Capillary Refill: Capillary refill takes less than 2 seconds  Neurological:      Mental Status: She is alert and oriented to person, place, and time  Psychiatric:         Mood and Affect: Mood normal          Behavior: Behavior normal           Diagnostic Test Review:  Attending Physician has personally reviewed pertinent imaging in PACS, impression is as follows:    Review of MRI series taken 8/3/2022 of the right shoulder is significant for:  1  Multiple right humeral and coracoid process bone lesions raising suspicion for metastatic disease  2   Subscapularis, supraspinatus and infraspinatus insertional tendinosis with suspected large supraspinatus tendon tear proximal to the tendon insertion  No secondary signs of chronicity  3   Advanced glenohumeral degenerative change with extensive full-thickness cartilage loss, subcortical cystic change and productive spurring  Associated joint effusion with complex synovitis  4   Complex subacromial subdeltoid bursitis with synovial thickening demonstrated  5   Status post button biceps tenodesis appearing grossly intact  Large joint arthrocentesis: R glenohumeral  [place] Protocol:  Consent: Verbal consent obtained  Risks and benefits: risks, benefits and alternatives were discussed  Consent given by: patient  Time out: Immediately prior to procedure a "time out" was called to verify the correct patient, procedure, equipment, support staff and site/side marked as required    Timeout called at: 8/30/2022 12:13 PM   Patient understanding: patient states understanding of the procedure being performed  Site marked: the operative site was marked  Patient identity confirmed: verbally with patient    Supporting Documentation  Indications: pain and joint swelling   Procedure Details  Location: shoulder - R glenohumeral  Preparation: Patient was prepped and draped in the usual sterile fashion  Needle size: 22 G (Spinal)  Ultrasound guidance: no  Approach: posterior  Medications administered: 4 mL bupivacaine 0 25 %; 40 mg triamcinolone acetonide 40 mg/mL    Patient tolerance: patient tolerated the procedure well with no immediate complications  Dressing:  Sterile dressing applied           Scribe Attestation    I,:  Jayme Correa am acting as a scribe while in the presence of the attending physician :       I,:  Arnulfo Terry DO personally performed the services described in this documentation    as scribed in my presence :

## 2022-09-12 ENCOUNTER — HOSPITAL ENCOUNTER (OUTPATIENT)
Dept: RADIOLOGY | Facility: HOSPITAL | Age: 57
Discharge: HOME | End: 2022-09-12
Payer: COMMERCIAL

## 2022-09-12 DIAGNOSIS — G89.29 CHRONIC IDIOPATHIC ANAL PAIN: ICD-10-CM

## 2022-09-12 DIAGNOSIS — M15.9 GENERALIZED OSTEOARTHROSIS, INVOLVING MULTIPLE SITES: ICD-10-CM

## 2022-09-12 DIAGNOSIS — K62.89 CHRONIC IDIOPATHIC ANAL PAIN: ICD-10-CM

## 2022-09-12 DIAGNOSIS — M25.521 RIGHT ELBOW PAIN: ICD-10-CM

## 2022-09-12 PROCEDURE — 73502 X-RAY EXAM HIP UNI 2-3 VIEWS: CPT

## 2022-09-12 PROCEDURE — 73070 X-RAY EXAM OF ELBOW: CPT

## 2022-09-22 ENCOUNTER — OFFICE VISIT (OUTPATIENT)
Dept: OBGYN CLINIC | Facility: OTHER | Age: 57
End: 2022-09-22
Payer: COMMERCIAL

## 2022-09-22 VITALS
SYSTOLIC BLOOD PRESSURE: 122 MMHG | DIASTOLIC BLOOD PRESSURE: 71 MMHG | BODY MASS INDEX: 17.85 KG/M2 | WEIGHT: 97 LBS | HEART RATE: 76 BPM | HEIGHT: 62 IN

## 2022-09-22 DIAGNOSIS — M19.011 OSTEOARTHRITIS OF GLENOHUMERAL JOINT, RIGHT: ICD-10-CM

## 2022-09-22 DIAGNOSIS — M89.9 HUMERUS LESION, RIGHT: Primary | ICD-10-CM

## 2022-09-22 PROCEDURE — 99204 OFFICE O/P NEW MOD 45 MIN: CPT | Performed by: ORTHOPAEDIC SURGERY

## 2022-09-22 NOTE — PROGRESS NOTES
Assessment  Diagnoses and all orders for this visit:    Humerus lesion, right    Osteoarthritis of glenohumeral joint, right        Discussion and Plan:    Findings today are consistent with advanced right glenohumeral osteoarthritis and rotator cuff deficiency with humeral and coracoid process bone lesions with the suspicion for metastatic disease vs subchondral changes secondary to the advanced glenohumeral osteoarthritis  In my opinion further evaluation and imaging is certainly required to ensure that these are not metastatic changes especially given the presence of a lesion in the coracoid process as well would not expect to see that in glenohumeral osteoarthritis change  Imaging and prognosis was reviewed with the patient today  Discussed with patient today that further imaging will need to be obtained to evaluate the lesions seen in last MRI as that was not with and without IV contrast  A referral for a MRI and CT scan was placed today to further evaluate the lesions  Once results are obtained appropriate referral will be made, possibly with Orthopedic Oncologist- Dr Vahid Mayberry if the findings are worrisome for metastatic disease  Subjective:   Patient ID: Raymundo Helms is a 56 y o  female      HPI  The patient presents with a chief complaint of right shoulder pain  The pain began several year(s) ago and is not associated with an acute injury  The patient describes the pain as dull in intensity,  intermittent in timing, and localizes the pain to the  right [facility] joint, posterior shoulder  The pain is worse with movement, overhead work, overuse, raising arm over head and walking and relieved by rest, ice, medication: Aleve, meloxicam used and beneficial   The pain is not associated with numbness and tingling  The pain is not associated with constitutional symptoms  The patient is awoken at night by the pain  The patient has tried physical therapy, aquatherapy, CSI (8/30/2022)   Patient also has history of neck pain and sees pain management at [facility] and received CSI in cervical spine          The following portions of the patient's history were reviewed and updated as appropriate: allergies, current medications, past family history, past medical history, past social history, past surgical history and problem list     Review of Systems   Constitutional: Negative for chills and fever  HENT: Negative for ear pain and sore throat  Eyes: Negative for pain and visual disturbance  Respiratory: Negative for cough and shortness of breath  Cardiovascular: Negative for chest pain and palpitations  Gastrointestinal: Negative for abdominal pain and vomiting  Genitourinary: Negative for dysuria and hematuria  Musculoskeletal: Positive for arthralgias (right shoulder) and back pain  Skin: Negative for color change and rash  Neurological: Negative for seizures and syncope  All other systems reviewed and are negative  Objective:  /71 (BP Location: Left arm, Patient Position: Sitting, Cuff Size: Adult)   Pulse 76   Ht 5' 2" (1 575 m)   Wt 44 kg (97 lb)   BMI 17 74 kg/m²       Right Shoulder Exam     Tenderness   The patient is experiencing tenderness in the acromioclavicular joint  Range of Motion   Active abduction: 90   Forward flexion: 90     Muscle Strength   Abduction: 3/5   Supraspinatus: 3/5   Biceps: 4/5     Tests   Cross arm: negative  Drop arm: positive    Other   Erythema: absent  Scars: absent  Sensation: normal  Pulse: present            Physical Exam  Eyes:      Pupils: Pupils are equal, round, and reactive to light  Pulmonary:      Effort: Pulmonary effort is normal       Breath sounds: Normal breath sounds  Skin:     General: Skin is warm and dry  Neurological:      Mental Status: She is alert and oriented to person, place, and time  Psychiatric:         Behavior: Behavior normal          Thought Content:  Thought content normal          Judgment: Judgment normal            I have personally reviewed pertinent films in PACS and my interpretation is as follows  MRI right shoulder demontrates multiple right humeral and coracoid process bone lesions concerned for metastatic disease  Glenohumeral degenerative changes      Scribe Attestation    I,:  Bryan Lau am acting as a scribe while in the presence of the attending physician :       I,:  Jaz Tidwell MD personally performed the services described in this documentation    as scribed in my presence :

## 2022-09-23 ENCOUNTER — APPOINTMENT (OUTPATIENT)
Dept: LAB | Facility: HOSPITAL | Age: 57
End: 2022-09-23
Payer: COMMERCIAL

## 2022-09-23 DIAGNOSIS — M89.9 HUMERUS LESION, RIGHT: ICD-10-CM

## 2022-09-23 LAB
ALBUMIN SERPL BCP-MCNC: 4 G/DL (ref 3.5–5)
ALP SERPL-CCNC: 73 U/L (ref 46–116)
ALT SERPL W P-5'-P-CCNC: 26 U/L (ref 12–78)
ANION GAP SERPL CALCULATED.3IONS-SCNC: 7 MMOL/L (ref 4–13)
AST SERPL W P-5'-P-CCNC: 21 U/L (ref 5–45)
BILIRUB SERPL-MCNC: 0.56 MG/DL (ref 0.2–1)
BUN SERPL-MCNC: 10 MG/DL (ref 5–25)
CALCIUM SERPL-MCNC: 9.3 MG/DL (ref 8.3–10.1)
CHLORIDE SERPL-SCNC: 98 MMOL/L (ref 96–108)
CO2 SERPL-SCNC: 28 MMOL/L (ref 21–32)
CREAT SERPL-MCNC: 0.58 MG/DL (ref 0.6–1.3)
GFR SERPL CREATININE-BSD FRML MDRD: 104 ML/MIN/1.73SQ M
GLUCOSE SERPL-MCNC: 129 MG/DL (ref 65–140)
POTASSIUM SERPL-SCNC: 3.7 MMOL/L (ref 3.5–5.3)
PROT SERPL-MCNC: 6.9 G/DL (ref 6.4–8.4)
SODIUM SERPL-SCNC: 133 MMOL/L (ref 135–147)

## 2022-09-23 PROCEDURE — 36415 COLL VENOUS BLD VENIPUNCTURE: CPT

## 2022-09-23 PROCEDURE — 80053 COMPREHEN METABOLIC PANEL: CPT

## 2023-05-17 NOTE — PROGRESS NOTES
Daily Note     Today's date: 2021  Patient name: Derek Carroll  : 1965  MRN: [id number]  Referring provider: RENEE Arita  Dx: No diagnosis found  Start Time: 920          Subjective: I feel like the therapy is finally working  The arm pain is almost non existent and I am able to sleep much better  Objective: See treatment diary below    Assessment: Tolerated treatment well and denied pain t/o session  She reports average/intermittent pain of 2/10 at R arm with ADL's  Added CS AROM and ball roll at wall with good angelica  Patient would benefit from continued PT    Plan: Continue per plan of care        Re-eval Date: 6/3/21    Date 5/3 5/6/21 5-10-21 5-11-21 5-18-21   Visit Count 1 2 3 4 5   FOTO Completed             Precautions: none       Manuals 5/3 5/6/21 5-10-21 5-11-21 5-18-21   Mechanical Traction   10 min   10 lb max  0 lb min  6 steps   10 min   10 lb max  0 lb min  6 step  10'  10# max  0# min  6 step   10'  10# max  0# min  6 steps                           Neuro Re-Ed         MTP/LTP  Green 1 x 15 ea  Green x 15 ea  Green x 15 ea Green x 15 ea   Tband ER julissa   1 x 15 red  Green x 10  Green x 10 Green x 15   Wall angels   1 x 10  1 x 10  1 x 10 1 x 10   Ball on wall      Up/down  Side/side  1 x 10 ea julissa                           Ther Ex        UBE - alt  10 minutes  10 min  10 min  10' 10'   Scapular retractions   1 x 20  1 x 20  1 x 20 1 x 20   Shld FF/Abd  1 x 10 ea  1 x 15 ea  1 x 15 1 x 15   CS retractions   5" x 10  5" x 10  5" x 10 5" x 10   CS isometrics  5" x 5 ea    5" x 10   CS AROM     1 x 10 ea   SA punches         Supine ABCs        Doorway stretch        Prone flex/HA/ext                        Ther Activity                        Gait Training                        Modalities        MHP prn
tenderness. Tympanic membrane is not erythematous or bulging. Left Ear: No tenderness. A middle ear effusion is present. No mastoid tenderness. Tympanic membrane is not erythematous or bulging. Nose: Congestion present. Mouth/Throat:      Lips: Pink. No lesions. Mouth: Mucous membranes are moist.      Tongue: No lesions. Tongue does not deviate from midline. Pharynx: Oropharynx is clear. Uvula midline. No pharyngeal swelling, oropharyngeal exudate, posterior oropharyngeal erythema or uvula swelling. Tonsils: No tonsillar exudate or tonsillar abscesses. Eyes:      Conjunctiva/sclera: Conjunctivae normal.   Cardiovascular:      Rate and Rhythm: Normal rate. Pulmonary:      Effort: Pulmonary effort is normal. No respiratory distress. Abdominal:      General: Abdomen is flat. Palpations: Abdomen is soft. Tenderness: There is no abdominal tenderness. Musculoskeletal:         General: Normal range of motion. Cervical back: Neck supple. Skin:     General: Skin is warm. Capillary Refill: Capillary refill takes less than 2 seconds. Findings: No rash. Neurological:      General: No focal deficit present. Mental Status: She is alert and oriented to person, place, and time. DIAGNOSTIC RESULTS     RADIOLOGY:   Interpretation per the Radiologist below, if available at the time of this note:    No orders to display        LABS:  Labs Reviewed - No data to display    All other labs were within normal range or not returned as of this dictation. EMERGENCY DEPARTMENT COURSE and DIFFERENTIAL DIAGNOSIS/MDM:   Medical Decision Making  Patient presenting ER with ear pain found to have acute serous ear effusions. Some mild nasal congestion. Posterior oropharynx is clear lungs are clear auscultation satting well. No fevers. Patient's symptoms consistent with viral URI however patient reports she has chronic fluid in ears.   Discussed symptomatic treatment
declines